# Patient Record
Sex: MALE | Race: WHITE | NOT HISPANIC OR LATINO | Employment: FULL TIME | URBAN - METROPOLITAN AREA
[De-identification: names, ages, dates, MRNs, and addresses within clinical notes are randomized per-mention and may not be internally consistent; named-entity substitution may affect disease eponyms.]

---

## 2017-04-06 ENCOUNTER — OFFICE VISIT (OUTPATIENT)
Dept: FAMILY MEDICINE | Facility: CLINIC | Age: 42
End: 2017-04-06
Payer: COMMERCIAL

## 2017-04-06 VITALS
SYSTOLIC BLOOD PRESSURE: 129 MMHG | HEART RATE: 60 BPM | BODY MASS INDEX: 28.27 KG/M2 | DIASTOLIC BLOOD PRESSURE: 85 MMHG | TEMPERATURE: 97.8 F | HEIGHT: 69 IN | WEIGHT: 190.9 LBS

## 2017-04-06 DIAGNOSIS — Z00.00 ROUTINE GENERAL MEDICAL EXAMINATION AT A HEALTH CARE FACILITY: Primary | ICD-10-CM

## 2017-04-06 DIAGNOSIS — R73.02 IGT (IMPAIRED GLUCOSE TOLERANCE): ICD-10-CM

## 2017-04-06 DIAGNOSIS — E78.6 HDL DEFICIENCY: ICD-10-CM

## 2017-04-06 DIAGNOSIS — Z13.6 CARDIOVASCULAR SCREENING; LDL GOAL LESS THAN 160: ICD-10-CM

## 2017-04-06 DIAGNOSIS — E78.1 HIGH TRIGLYCERIDES: ICD-10-CM

## 2017-04-06 PROCEDURE — 99396 PREV VISIT EST AGE 40-64: CPT | Performed by: FAMILY MEDICINE

## 2017-04-06 NOTE — PROGRESS NOTES
SUBJECTIVE:     CC: Ignacio Poole is an 41 year old male who presents for preventative health visit.     Healthy Habits:    Do you get at least three servings of calcium containing foods daily (dairy, green leafy vegetables, etc.)? yes    Amount of exercise or daily activities, outside of work: 15-30 minutes 1 day a week.    Problems taking medications regularly not applicable    Medication side effects: No    Have you had an eye exam in the past two years? no    Do you see a dentist twice per year? no    Do you have sleep apnea, excessive snoring or daytime drowsiness? Daytime drowsiness            Today's PHQ-2 Score:   PHQ-2 ( 1999 [employer]) 4/6/2017 4/3/2017   Q1: Little interest or pleasure in doing things 0 -   Q2: Feeling down, depressed or hopeless 0 -   PHQ-2 Score 0 -   Little interest or pleasure in doing things - Several days   Feeling down, depressed or hopeless - Several days   PHQ-2 Score - 2       Abuse: Current or Past(Physical, Sexual or Emotional)- No  Do you feel safe in your environment - Yes    Social History   Substance Use Topics     Smoking status: Never Smoker     Smokeless tobacco: Never Used     Alcohol use No     The patient does not drink >3 drinks per day nor >7 drinks per week.    Last PSA: No results found for: PSA    Recent Labs   Lab Test  11/06/14   0800   CHOL  170   HDL  31*   LDL  73   TRIG  332*   CHOLHDLRATIO  5.5*       Reviewed orders with patient. Reviewed health maintenance and updated orders accordingly - Yes    Reviewed and updated as needed this visit by clinical staff  Tobacco  Allergies  Meds  Med Hx  Surg Hx  Fam Hx  Soc Hx        Reviewed and updated as needed this visit by Provider      ROS:  C: NEGATIVE for fever, chills, change in weight  I: NEGATIVE for worrisome rashes, moles or lesions  E: NEGATIVE for vision changes or irritation  ENT: NEGATIVE for ear, mouth and throat problems  R: NEGATIVE for significant cough or SOB  CV: NEGATIVE for chest pain,  "palpitations or peripheral edema  GI: NEGATIVE for nausea, abdominal pain, heartburn, or change in bowel habits   male: negative for dysuria, hematuria, decreased urinary stream, erectile dysfunction, urethral discharge  M: NEGATIVE for significant arthralgias or myalgia  N: NEGATIVE for weakness, dizziness or paresthesias  P: NEGATIVE for changes in mood or affect      OBJECTIVE:     /85  Pulse 60  Temp 97.8  F (36.6  C) (Oral)  Ht 5' 9\" (1.753 m)  Wt 190 lb 14.4 oz (86.6 kg)  BMI 28.19 kg/m2  4}    ASSESSMENT/PLAN:         ICD-10-CM    1. Routine general medical examination at a health care facility Z00.00 Lipid panel reflex to direct LDL     Comprehensive metabolic panel   2. IGT (impaired glucose tolerance) R73.02 Comprehensive metabolic panel   3. High triglycerides E78.1 Lipid panel reflex to direct LDL     Comprehensive metabolic panel   4. HDL deficiency E78.6 Lipid panel reflex to direct LDL     Comprehensive metabolic panel   5. CARDIOVASCULAR SCREENING; LDL GOAL LESS THAN 160 Z13.6 Lipid panel reflex to direct LDL     Comprehensive metabolic panel       COUNSELING:  Reviewed preventive health counseling, as reflected in patient instructions    BP Screening:   Last 3 BP Readings:    BP Readings from Last 3 Encounters:   04/06/17 129/85   02/15/16 128/79   12/11/14 123/70       The following was recommended to the patient:  Re-screen BP within a year and recommended lifestyle modifications     reports that he has never smoked. He has never used smokeless tobacco.    Estimated body mass index is 28.19 kg/(m^2) as calculated from the following:    Height as of this encounter: 5' 9\" (1.753 m).    Weight as of this encounter: 190 lb 14.4 oz (86.6 kg).   Weight management plan: Discussed healthy diet and exercise guidelines and patient will follow up in 12 months in clinic to re-evaluate.    Counseling Resources:  ATP IV Guidelines  Pooled Cohorts Equation Calculator  FRAX Risk Assessment  ICSI " Preventive Guidelines  Dietary Guidelines for Americans, 2010  Go[employer]'s MyPlate  ASA Prophylaxis  Lung CA Screening    Matthieu Monzon MD  [facility]  --------------------------------------------------------------------------------------------------------------------------------------    SUBJECTIVE:  Ignacio Poole is a 41 year old male who presents to the clinic today for a routine physical exam.    The patient's last physical was 3 years ago.     Cholesterol   Date Value Ref Range Status   11/06/2014 170 <200 mg/dL Final     Comment:     LDL Cholesterol is the primary guide to therapy.   The NCEP recommends further evaluation of: patients with cholesterol greater   than 200 mg/dL if additional risk factors are present, cholesterol greater   than   240 mg/dL, triglycerides greater than 150 mg/dL, or HDL less than 40 mg/dL.     06/04/2004 169@ 115 - 199 mg/dL      HDL Cholesterol   Date Value Ref Range Status   11/06/2014 31 (L) >40 mg/dL Final   06/04/2004 32@ mg/dL      LDL Cholesterol Calculated   Date Value Ref Range Status   11/06/2014 73 0 - 129 mg/dL Final     Comment:     LDL Cholesterol is the primary guide to therapy: LDL-cholesterol goal in high   risk patients is <100 mg/dL and in very high risk patients is <70 mg/dL.     06/04/2004 110@ mg/dL      Triglycerides   Date Value Ref Range Status   11/06/2014 332 (H) 0 - 150 mg/dL Final     Comment:     Fasting specimen   06/04/2004 137@ mg/dL      Cholesterol/HDL Ratio   Date Value Ref Range Status   11/06/2014 5.5 (H) 0.0 - 5.0 Final   06/04/2004 5.3@       The patient's last fasting lipid panel was done 3 years ago and the results are listed above.        The 10-year ASCVD risk score ([place]andi FLOYD Jr, et al., 2013) is: 2.8%    Values used to calculate the score:      Age: 41 years      Sex: Male      Is Non- : No      Diabetic: No      Tobacco smoker: No      Systolic Blood Pressure: 129 mmHg      Is BP treated: No       HDL Cholesterol: 31 mg/dL      Total Cholesterol: 5.5 mmol/L        The patient reports that he has never been treated for high blood pressure.    The patient reports that he does not take a daily aspirin.    No results found for: HCVAB  The patient reports that he has not been screened for Hepatitis C    (Screen all baby boomers once per CDC-- the generation born from 1945 through 1965)    Immunization History   Administered Date(s) Administered     Influenza (IIV3) 11/12/2011, 10/29/2012     Influenza Vaccine IM 3yrs+ 4 Valent IIV4 12/17/2013     TD (ADULT, 7+) 06/04/2004     TDAP Vaccine (Adacel) 02/22/2013     The patient's believes that his last tetanus shot was given 3 year(s) ago.   The patient believes that he has not had a Zostavax in the past  The patient believes that he has not had a PPSV23 in the past.  The patient believes that he has not had a PCV13 in the past.  The patient believes that he has not had a seasonal flu vaccination this fall or winter.  The patient would like to have a no vaccinations today      No results found for this or any previous visit.]   The patient denies a family history of colon cancer.  The patient reports that he has not had a colonoscopy.    The patient reports that he does performs a self testicular exam monthly.  His currently used contraception is condoms. He is interested in a vasectomy in the near future.  The patient reports a family history of diabetes in his father and grandfather. .  The patient reports that he eats or drinks 3 servings of dairy products per day.   The patient reports that he has not had a dental appointments for approximately every 12 months.    The patient reports that he  has an eye examination approximately every 1.0 year(s).    Do you currently smoke? No  How many years have you smoked? 0   How many packs per day did you smoke on average? N/A  (if more than 30 pack year history and the patient is age 55-80 consider ordering an annual low  dose radiation lung CT to screen for cancer)  (Do not order if patient has quit more than 15 years ago or has a health condition that limits life expectancy or could not tolerate curative lung surgery)  Are you interested having a lung CT to screen for lung cancer? N/A    If the patient has smoked more that 100 cigarettes, has the patient had an imaging study (US or CT) for an AAA between the ages of 65 and 75? N/A              Patient Active Problem List   Diagnosis     CARDIOVASCULAR SCREENING; LDL GOAL LESS THAN 160     Migraine headache     Insomnia     Eczema     IGT (impaired glucose tolerance)     High triglycerides       Past Surgical History:   Procedure Laterality Date     BIOPSY  youth    lymph node neck     BIOPSY  8/27/85    lymph node neck       Family History   Problem Relation Age of Onset     GASTROINTESTINAL DISEASE Father      crohns     Asthma Father      Prostate Cancer Paternal Grandfather      GASTROINTESTINAL DISEASE Sister      crohns     Asthma Sister        Social History     Social History     Marital status:      Spouse name: N/A     Number of children: N/A     Years of education: N/A     Occupational History     Not on file.     Social History Main Topics     Smoking status: Never Smoker     Smokeless tobacco: Never Used     Alcohol use No     Drug use: No     Sexual activity: Yes     Partners: Female     Birth control/ protection: Condom, Rhythm, Abstinence     Other Topics Concern     Parent/Sibling W/ Cabg, Mi Or Angioplasty Before 65f 55m? No     Social History Narrative       Current Outpatient Prescriptions   Medication Sig Dispense Refill     traZODone (DESYREL) 50 MG tablet Take 1 tablet (50 mg) by mouth At Bedtime - DUE FOR OFFICE VISIT 90 tablet 0     halobetasol (ULTRAVATE) 0.05 % cream Apply topically 2 times daily (Patient not taking: Reported on 4/6/2017) 60 g 2     ibuprofen (ADVIL,MOTRIN) 200 MG tablet Take 200 mg by mouth every 4 hours as needed Reported on  "4/6/2017         PHYSICAL EXAMINATION:  Blood pressure 129/85, pulse 60, temperature 97.8  F (36.6  C), temperature source Oral, height 5' 9\" (1.753 m), weight 190 lb 14.4 oz (86.6 kg).  General appearance - healthy, alert and no distress  Skin - Skin color, texture, turgor normal. No rashes or lesions.  Head - Normocephalic. No masses, lesions, tenderness or abnormalities  Eyes - conjunctivae/corneas clear. PERRL, EOM's intact. Fundi benign  Ears - External ears normal. Canals clear. TM's normal.  Nose/Sinuses - Nares normal. Septum midline. Mucosa normal. No drainage or sinus tenderness.  Oropharynx - Lips, mucosa, and tongue normal. Teeth and gums normal.  Neck - Neck supple. No adenopathy. Thyroid symmetric, normal size,  Lungs - Percussion normal. Good diaphragmatic excursion. Lungs clear  Heart - PMI normal. No lifts, heaves, or thrills. RRR. No murmurs, clicks gallops or rub  Abdomen - Abdomen soft, non-tender. BS normal. No masses, organomegaly  Extremities - Extremities normal. No deformities, edema, or skin discoloration.  Musculoskeletal - Spine ROM normal. Muscular strength intact.  Peripheral pulses - radial=4/4, femoral=4/4, popliteal=4/4, dorsalis pedis=4/4,  Neuro - Gait normal. Reflexes normal and symmetric. Sensation grossly WNL.  Genitalia - Penis normal. No urethral discharge. Scrotum normal to palpation. No hernia.  Rectal - Rectal negative. Prostate palpation negative.  No rectal masses or abnormalities      Office Visit on 02/15/2016   Component Date Value Ref Range Status     Specimen Description 02/15/2016 Throat   Final     Rapid Strep A Screen 02/15/2016    Final                    Value:NEGATIVE: No Group A streptococcal antigen detected by immunoassay, await   culture report.       Micro Report Status 02/15/2016 FINAL 02/15/2016   Final     Specimen Description 02/15/2016 Throat   Final     Culture Micro 02/15/2016 No Beta Streptococcus isolated   Final     Micro Report Status 02/15/2016 " FINAL 02/17/2016   Final       ASSESSMENT:  No diagnosis found.    Well-Adult Physical Exam.  There are no preventive care reminders to display for this patient.  Health Maintenance   Topic Date Due     INFLUENZA VACCINE (SYSTEM ASSIGNED)  09/01/2017     LIPID SCREEN Q5 YR MALE (SYSTEM ASSIGNED)  11/06/2019     TETANUS IMMUNIZATION (SYSTEM ASSIGNED)  02/22/2023     MIGRAINE ACTION PLAN,ONE TIME,NO INBASKET  Completed         HEALTH CARE MAINTENENCE: The recommended screening tests and vaccinatons for this patient have been discussed as above.  The appropriate tests and vaccinations  have been ordered or declined by the patient. Please see the orders in EPIC.The patient specifically declines: n/a     Immunization Status:  up to date and documented    Patient Active Problem List   Diagnosis     CARDIOVASCULAR SCREENING; LDL GOAL LESS THAN 160     Migraine headache     Insomnia     Eczema     IGT (impaired glucose tolerance)     High triglycerides        ATP III Guidelines  ICSI Preventive Guidelines    PLAN:   The patient will make a future lab appointment for all bloodwork as they are not fasting today  Check a fasting lipid profile  Testicular self-exam encouraged  Check a fasting glucose  Discussed calcium intake, vitamins and supplements. Recommended 1000 mg of calcium daily  Weight loss through diet and exercise was recommended  Sunscreen use was recommended especially in the area of tatoos  Recommended dental exams every 6 months  Recommended eye exam every 1-2 years  Follow up in 1 year for the next preventative medical visit

## 2017-04-06 NOTE — NURSING NOTE
"Chief Complaint   Patient presents with     Physical       Initial /85  Pulse 60  Temp 97.8  F (36.6  C) (Oral)  Ht 5' 9\" (1.753 m)  Wt 190 lb 14.4 oz (86.6 kg)  BMI 28.19 kg/m2 Estimated body mass index is 28.19 kg/(m^2) as calculated from the following:    Height as of this encounter: 5' 9\" (1.753 m).    Weight as of this encounter: 190 lb 14.4 oz (86.6 kg).  Medication Reconciliation: complete   Loretta Herbert CMA      "

## 2017-04-06 NOTE — MR AVS SNAPSHOT
After Visit Summary   4/6/2017    Ignacio Poole    MRN: [id number]           Patient Information     Date Of Birth          1975        Visit Information        Provider Department      4/6/2017 6:30 PM Matthieu Monzon MD [facility]        Today's Diagnoses     Routine general medical examination at a health care facility    -  1    IGT (impaired glucose tolerance)        High triglycerides        HDL deficiency        CARDIOVASCULAR SCREENING; LDL GOAL LESS THAN 160          Care Instructions      Preventive Health Recommendations  Male Ages 40 to 49    Yearly exam:             See your health care provider every year in order to  o   Review health changes.   o   Discuss preventive care.    o   Review your medicines if your doctor has prescribed any.    You should be tested each year for STDs (sexually transmitted diseases) if you re at risk.     Have a cholesterol test every 5 years.     Have a colonoscopy (test for colon cancer) if someone in your family has had colon cancer or polyps before age 50.     After age 45, have a diabetes test (fasting glucose). If you are at risk for diabetes, you should have this test every 3 years.      Talk with your health care provider about whether or not a prostate cancer screening test (PSA) is right for you.    Shots: Get a flu shot each year. Get a tetanus shot every 10 years.     Nutrition:    Eat at least 5 servings of fruits and vegetables daily.     Eat whole-grain bread, whole-wheat pasta and brown rice instead of white grains and rice.     Talk to your provider about Calcium and Vitamin D.     Lifestyle    Exercise for at least 150 minutes a week (30 minutes a day, 5 days a week). This will help you control your weight and prevent disease.     Limit alcohol to one drink per day.     No smoking.     Wear sunscreen to prevent skin cancer.     See your dentist every six months for an exam and cleaning.       Please schedule a future  "laboratory appointment(s) and be sure to have fasted for 10 hours prior to arrival             Follow-ups after your visit        Future tests that were ordered for you today     Open Future Orders        Priority Expected Expires Ordered    Lipid panel reflex to direct LDL Routine  7/6/2017 4/6/2017    Comprehensive metabolic panel Routine  7/6/2017 4/6/2017            Who to contact     If you have questions or need follow up information about today's clinic visit or your schedule please contact [facility] directly at [phone].  Normal or non-critical lab and imaging results will be communicated to you by Yozonshart, letter or phone within 4 business days after the clinic has received the results. If you do not hear from us within 7 days, please contact the clinic through SoundBettert or phone. If you have a critical or abnormal lab result, we will notify you by phone as soon as possible.  Submit refill requests through MobileIgniter or call your pharmacy and they will forward the refill request to us. Please allow 3 business days for your refill to be completed.          Additional Information About Your Visit        Yozonshart Information     MobileIgniter gives you secure access to your electronic health record. If you see a primary care provider, you can also send messages to your care team and make appointments. If you have questions, please call your primary care clinic.  If you do not have a primary care provider, please call [phone] and they will assist you.        Care EveryWhere ID     This is your Care EveryWhere ID. This could be used by other organizations to access your [place] medical records  [id number]        Your Vitals Were     Pulse Temperature Height BMI (Body Mass Index)          60 97.8  F (36.6  C) (Oral) 5' 9\" (1.753 m) 28.19 kg/m2         Blood Pressure from Last 3 Encounters:   04/06/17 129/85   02/15/16 128/79   12/11/14 123/70    Weight from Last 3 Encounters:   04/06/17 190 lb 14.4 " oz (86.6 kg)   02/15/16 191 lb 12.8 oz (87 kg)   12/11/14 184 lb (83.5 kg)               Primary Care Provider Office Phone # Fax #    Matthieu Monzon [phone]       [facility] [address] [facility] [address]        Thank you!     Thank you for choosing [facility]  for your care. Our goal is always to provide you with excellent care. Hearing back from our patients is one way we can continue to improve our services. Please take a few minutes to complete the written survey that you may receive in the mail after your visit with us. Thank you!             Your Updated Medication List - Protect others around you: Learn how to safely use, store and throw away your medicines at www.disposemymeds.org.          This list is accurate as of: 4/6/17  7:09 PM.  Always use your most recent med list.                   Brand Name Dispense Instructions for use    halobetasol 0.05 % cream    ULTRAVATE    60 g    Apply topically 2 times daily       ibuprofen 200 MG tablet    ADVIL/MOTRIN     Take 200 mg by mouth every 4 hours as needed Reported on 4/6/2017       traZODone 50 MG tablet    DESYREL    90 tablet    Take 1 tablet (50 mg) by mouth At Bedtime - DUE FOR OFFICE VISIT

## 2017-04-06 NOTE — PATIENT INSTRUCTIONS
Preventive Health Recommendations  Male Ages 40 to 49    Yearly exam:             See your health care provider every year in order to  o   Review health changes.   o   Discuss preventive care.    o   Review your medicines if your doctor has prescribed any.    You should be tested each year for STDs (sexually transmitted diseases) if you re at risk.     Have a cholesterol test every 5 years.     Have a colonoscopy (test for colon cancer) if someone in your family has had colon cancer or polyps before age 50.     After age 45, have a diabetes test (fasting glucose). If you are at risk for diabetes, you should have this test every 3 years.      Talk with your health care provider about whether or not a prostate cancer screening test (PSA) is right for you.    Shots: Get a flu shot each year. Get a tetanus shot every 10 years.     Nutrition:    Eat at least 5 servings of fruits and vegetables daily.     Eat whole-grain bread, whole-wheat pasta and brown rice instead of white grains and rice.     Talk to your provider about Calcium and Vitamin D.     Lifestyle    Exercise for at least 150 minutes a week (30 minutes a day, 5 days a week). This will help you control your weight and prevent disease.     Limit alcohol to one drink per day.     No smoking.     Wear sunscreen to prevent skin cancer.     See your dentist every six months for an exam and cleaning.       Please schedule a future laboratory appointment(s) and be sure to have fasted for 10 hours prior to arrival

## 2017-04-11 DIAGNOSIS — Z13.6 CARDIOVASCULAR SCREENING; LDL GOAL LESS THAN 160: ICD-10-CM

## 2017-04-11 DIAGNOSIS — E78.1 HIGH TRIGLYCERIDES: ICD-10-CM

## 2017-04-11 DIAGNOSIS — E78.6 HDL DEFICIENCY: ICD-10-CM

## 2017-04-11 DIAGNOSIS — R73.02 IGT (IMPAIRED GLUCOSE TOLERANCE): ICD-10-CM

## 2017-04-11 DIAGNOSIS — Z00.00 ROUTINE GENERAL MEDICAL EXAMINATION AT A HEALTH CARE FACILITY: ICD-10-CM

## 2017-04-11 PROBLEM — Z91.89 10 YEAR RISK OF MI OR STROKE < 7.5%: Status: ACTIVE | Noted: 2017-04-11

## 2017-04-11 LAB
ALBUMIN SERPL-MCNC: 4 G/DL (ref 3.4–5)
ALP SERPL-CCNC: 78 U/L (ref 40–150)
ALT SERPL W P-5'-P-CCNC: 28 U/L (ref 0–70)
ANION GAP SERPL CALCULATED.3IONS-SCNC: 8 MMOL/L (ref 3–14)
AST SERPL W P-5'-P-CCNC: 19 U/L (ref 0–45)
BILIRUB SERPL-MCNC: 2.9 MG/DL (ref 0.2–1.3)
BUN SERPL-MCNC: 10 MG/DL (ref 7–30)
CALCIUM SERPL-MCNC: 9.1 MG/DL (ref 8.5–10.1)
CHLORIDE SERPL-SCNC: 104 MMOL/L (ref 94–109)
CHOLEST SERPL-MCNC: 160 MG/DL
CO2 SERPL-SCNC: 30 MMOL/L (ref 20–32)
CREAT SERPL-MCNC: 1.18 MG/DL (ref 0.66–1.25)
GFR SERPL CREATININE-BSD FRML MDRD: 68 ML/MIN/1.7M2
GLUCOSE SERPL-MCNC: 105 MG/DL (ref 70–99)
HDLC SERPL-MCNC: 40 MG/DL
LDLC SERPL CALC-MCNC: 72 MG/DL
NONHDLC SERPL-MCNC: 120 MG/DL
POTASSIUM SERPL-SCNC: 4 MMOL/L (ref 3.4–5.3)
PROT SERPL-MCNC: 6.9 G/DL (ref 6.8–8.8)
SODIUM SERPL-SCNC: 142 MMOL/L (ref 133–144)
TRIGL SERPL-MCNC: 238 MG/DL

## 2017-04-11 PROCEDURE — 80053 COMPREHEN METABOLIC PANEL: CPT | Performed by: FAMILY MEDICINE

## 2017-04-11 PROCEDURE — 80061 LIPID PANEL: CPT | Performed by: FAMILY MEDICINE

## 2017-04-11 PROCEDURE — 36415 COLL VENOUS BLD VENIPUNCTURE: CPT | Performed by: FAMILY MEDICINE

## 2017-04-11 NOTE — PROGRESS NOTES
Ignacio,  I have reviewed the results of the laboratory tests that we recently ordered. All of the lab work performed was normal or considered normal for you except the triglycerides and the glucose.    Ignacio,  The results of your glucose (blood sugar) test was slightly high and in the range of what is called Impaired Glucose Tolerance. This means that you are at a higher risk to develop type 2 diabetes in the future. Regular aerobic exercise and weight loss are the best ways to prevent yourself from having full blown diabetes. We should recheck this lab test every year at your physical.      The 10-year ASCVD risk score ([place] MARIEL Jr, et al., 2013) is: 1.1%    Values used to calculate the score:      Age: 41 years      Sex: Male      Is Non- : No      Diabetic: No      Tobacco smoker: No      Systolic Blood Pressure: 129 mmHg      Is BP treated: No      HDL Cholesterol: 40 mg/dL      Total Cholesterol: 160 mg/dL        Dear Ignacio,  Your cholesterol results are back and are abnormal. The lab values are included in this letter.    Your LDL (bad) cholesterol was normal.  The maximum acceptable level for you based on multiple risk factors including your family history, age, smoking status, and the presence of other diseases such as hypertension, diabetes, or heart disease is less than 190.    Your Triglycerides were too high.  Triglycerides are a measure of fat in your blood and high levels can put you at risk. The maximum acceptable value is 150.    Your HDL (good) cholesterol was normal.  A low HDL can put you at risk. The lowest acceptable level is generally considered to be 40 and some authorities recommend it be 50 or higher for women.    Therapeutic life style changes are usually recommended as the first treatment to help improve your cholesterol. My recommendations at this time include:    -Eating a lower fat and lower cholesterol diet.  -Eating less sweets with simple sugars like candy  or baked goods.  -Regular aerobic exercise such as walking, running, biking, roller blading or swimming.        Sincerely,  Matthieu Monzon    [address] [facility] [id number]    Sincerely,   Matthieu Monzon

## 2017-05-12 ENCOUNTER — MYC MEDICAL ADVICE (OUTPATIENT)
Dept: FAMILY MEDICINE | Facility: CLINIC | Age: 42
End: 2017-05-12

## 2017-05-12 DIAGNOSIS — G43.809 OTHER MIGRAINE WITHOUT STATUS MIGRAINOSUS, NOT INTRACTABLE: Primary | ICD-10-CM

## 2017-05-12 RX ORDER — BUTALBITAL, ACETAMINOPHEN AND CAFFEINE 50; 325; 40 MG/1; MG/1; MG/1
1 TABLET ORAL EVERY 4 HOURS PRN
Qty: 30 TABLET | Refills: 1 | Status: SHIPPED | OUTPATIENT
Start: 2017-05-12 | End: 2017-05-16

## 2017-05-12 NOTE — TELEPHONE ENCOUNTER
Pharmacy Monitoring Program, [place] shows no controlled substance medication in the past year.   Lesly Mixon RN

## 2017-05-12 NOTE — TELEPHONE ENCOUNTER
The requested prescription(s) has/have been approved and has/have been printed and signed. This note was forwarded to the patient care pool to contact the patient to arrange for the patient to obtain the signed prescription(s).  Matthieu Monzon

## 2017-05-17 RX ORDER — BUTALBITAL, ACETAMINOPHEN AND CAFFEINE 50; 325; 40 MG/1; MG/1; MG/1
1 TABLET ORAL EVERY 4 HOURS PRN
Qty: 30 TABLET | Refills: 1 | Status: SHIPPED | OUTPATIENT
Start: 2017-05-17 | End: 2018-04-16

## 2017-10-30 DIAGNOSIS — G47.00 INSOMNIA: ICD-10-CM

## 2017-10-30 RX ORDER — TRAZODONE HYDROCHLORIDE 50 MG/1
50 TABLET, FILM COATED ORAL AT BEDTIME
Qty: 90 TABLET | Refills: 1 | Status: SHIPPED | OUTPATIENT
Start: 2017-10-30 | End: 2019-05-28

## 2018-04-12 NOTE — PROGRESS NOTES
SUBJECTIVE:   CC: Ignacio Poole is an 42 year old male who presents for preventative health visit.     Physical   Annual:     Getting at least 3 servings of Calcium per day::  Yes    Bi-annual eye exam::  Yes    Dental care twice a year::  NO    Sleep apnea or symptoms of sleep apnea::  Daytime drowsiness    Diet::  Carbohydrate counting    Frequency of exercise::  1 day/week    Duration of exercise::  Less than 15 minutes    Taking medications regularly::  Yes    Medication side effects::  None    Additional concerns today::  No                     Today's PHQ-2 Score:   PHQ-2 ( 1999 [employer]) 4/12/2018   Q1: Little interest or pleasure in doing things 1   Q2: Feeling down, depressed or hopeless 1   PHQ-2 Score 2   Q1: Little interest or pleasure in doing things Several days   Q2: Feeling down, depressed or hopeless Several days   PHQ-2 Score 2       Abuse: Current or Past(Physical, Sexual or Emotional)- No  Do you feel safe in your environment - Yes    Social History   Substance Use Topics     Smoking status: Never Smoker     Smokeless tobacco: Never Used     Alcohol use No     Alcohol Use 4/12/2018   If you drink alcohol do you typically have greater than 3 drinks per day OR greater than 7 drinks per week? No   No flowsheet data found.    Last PSA: No results found for: PSA    Reviewed orders with patient. Reviewed health maintenance and updated orders accordingly - Yes       Reviewed and updated as needed this visit by clinical staff  Tobacco  Allergies  Meds         Reviewed and updated as needed this visit by Provider            Review of Systems  C: NEGATIVE for fever, chills, change in weight  I: NEGATIVE for worrisome rashes, moles or lesions  E: NEGATIVE for vision changes or irritation  ENT: NEGATIVE for ear, mouth and throat problems  R: NEGATIVE for significant cough or SOB  CV: NEGATIVE for chest pain, palpitations or peripheral edema  GI: NEGATIVE for nausea, abdominal pain, heartburn, or change  "in bowel habits   male: negative for dysuria, hematuria, decreased urinary stream, erectile dysfunction, urethral discharge  M: NEGATIVE for significant arthralgias or myalgia  N: NEGATIVE for weakness, dizziness or paresthesias  P: NEGATIVE for changes in mood or affect    OBJECTIVE:   /70  Pulse 80  Temp 98  F (36.7  C) (Oral)  Resp 16  Ht 5' 9\" (1.753 m)  Wt 193 lb (87.5 kg)  SpO2 98%  BMI 28.5 kg/m2    Physical Exam  GENERAL: healthy, alert and no distress  EYES: Eyes grossly normal to inspection, PERRL and conjunctivae and sclerae normal  HENT: ear canals and TM's normal, nose and mouth without ulcers or lesions  NECK: no adenopathy, no asymmetry, masses, or scars and thyroid normal to palpation  RESP: lungs clear to auscultation - no rales, rhonchi or wheezes  CV: regular rate and rhythm, normal S1 S2, no S3 or S4, no murmur, click or rub, no peripheral edema and peripheral pulses strong  ABDOMEN: soft, nontender, no hepatosplenomegaly, no masses and bowel sounds normal  MS: no gross musculoskeletal defects noted, no edema  SKIN: no suspicious lesions or rashes  NEURO: Normal strength and tone, mentation intact and speech normal  PSYCH: mentation appears normal, affect normal/bright    ASSESSMENT/PLAN:       ICD-10-CM    1. Routine general medical examination at a health care facility Z00.00    2. Other migraine without status migrainosus, not intractable G43.809 butalbital-acetaminophen-caffeine (FIORICET/ESGIC) -40 MG per tablet       COUNSELING:   Reviewed preventive health counseling, as reflected in patient instructions       Regular exercise       Healthy diet/nutrition         reports that he has never smoked. He has never used smokeless tobacco.    Estimated body mass index is 28.5 kg/(m^2) as calculated from the following:    Height as of this encounter: 5' 9\" (1.753 m).    Weight as of this encounter: 193 lb (87.5 kg).     Lose weight with exercise program  Counseling " Resources:  ATP IV Guidelines  Pooled Cohorts Equation Calculator  FRAX Risk Assessment  ICSI Preventive Guidelines  Dietary Guidelines for Americans, 2010  [employer] MyPlate  ASA Prophylaxis  Lung CA Screening    Harris Martinez MD  [facility] [place]  Answers for HPI/ROS submitted by the patient on 4/12/2018   PHQ-2 Score: 2  Blood work at work

## 2018-04-16 ENCOUNTER — OFFICE VISIT (OUTPATIENT)
Dept: FAMILY MEDICINE | Facility: CLINIC | Age: 43
End: 2018-04-16
Payer: COMMERCIAL

## 2018-04-16 VITALS
RESPIRATION RATE: 16 BRPM | OXYGEN SATURATION: 98 % | TEMPERATURE: 98 F | HEART RATE: 80 BPM | BODY MASS INDEX: 28.58 KG/M2 | DIASTOLIC BLOOD PRESSURE: 70 MMHG | HEIGHT: 69 IN | SYSTOLIC BLOOD PRESSURE: 122 MMHG | WEIGHT: 193 LBS

## 2018-04-16 DIAGNOSIS — G43.809 OTHER MIGRAINE WITHOUT STATUS MIGRAINOSUS, NOT INTRACTABLE: ICD-10-CM

## 2018-04-16 DIAGNOSIS — Z00.00 ROUTINE GENERAL MEDICAL EXAMINATION AT A HEALTH CARE FACILITY: Primary | ICD-10-CM

## 2018-04-16 PROCEDURE — 99396 PREV VISIT EST AGE 40-64: CPT | Performed by: FAMILY MEDICINE

## 2018-04-16 RX ORDER — BUTALBITAL, ACETAMINOPHEN AND CAFFEINE 50; 325; 40 MG/1; MG/1; MG/1
1 TABLET ORAL EVERY 4 HOURS PRN
Qty: 30 TABLET | Refills: 1 | Status: SHIPPED | OUTPATIENT
Start: 2018-04-16 | End: 2019-04-08

## 2018-04-16 NOTE — MR AVS SNAPSHOT
After Visit Summary   4/16/2018    Ignacio Poole    MRN: [id number]           Patient Information     Date Of Birth          1975        Visit Information        Provider Department      4/16/2018 3:15 PM Harris Martinez MD [facility]        Today's Diagnoses     Routine general medical examination at a health care facility    -  1    Other migraine without status migrainosus, not intractable          Care Instructions      Preventive Health Recommendations  Male Ages 40 to 49    Yearly exam:             See your health care provider every year in order to  o   Review health changes.   o   Discuss preventive care.    o   Review your medicines if your doctor has prescribed any.    You should be tested each year for STDs (sexually transmitted diseases) if you re at risk.     Have a cholesterol test every 5 years.     Have a colonoscopy (test for colon cancer) if someone in your family has had colon cancer or polyps before age 50.     After age 45, have a diabetes test (fasting glucose). If you are at risk for diabetes, you should have this test every 3 years.      Talk with your health care provider about whether or not a prostate cancer screening test (PSA) is right for you.    Shots: Get a flu shot each year. Get a tetanus shot every 10 years.     Nutrition:    Eat at least 5 servings of fruits and vegetables daily.     Eat whole-grain bread, whole-wheat pasta and brown rice instead of white grains and rice.     Talk to your provider about Calcium and Vitamin D.     Lifestyle    Exercise for at least 150 minutes a week (30 minutes a day, 5 days a week). This will help you control your weight and prevent disease.     Limit alcohol to one drink per day.     No smoking.     Wear sunscreen to prevent skin cancer.     See your dentist every six months for an exam and cleaning.              Follow-ups after your visit        Who to contact     If you have questions or need follow  "up information about today's clinic visit or your schedule please contact [facility] directly at [phone].  Normal or non-critical lab and imaging results will be communicated to you by MyChart, letter or phone within 4 business days after the clinic has received the results. If you do not hear from us within 7 days, please contact the clinic through [facility] or phone. If you have a critical or abnormal lab result, we will notify you by phone as soon as possible.  Submit refill requests through [facility] or call your pharmacy and they will forward the refill request to us. Please allow 3 business days for your refill to be completed.          Additional Information About Your Visit        [facility] Information     [facility] gives you secure access to your electronic health record. If you see a primary care provider, you can also send messages to your care team and make appointments. If you have questions, please call your primary care clinic.  If you do not have a primary care provider, please call [phone] and they will assist you.        Care EveryWhere ID     This is your Care EveryWhere ID. This could be used by other organizations to access your [facility] medical records  [id number]        Your Vitals Were     Pulse Temperature Respirations Height Pulse Oximetry BMI (Body Mass Index)    80 98  F (36.7  C) (Oral) 16 5' 9\" (1.753 m) 98% 28.5 kg/m2       Blood Pressure from Last 3 Encounters:   04/16/18 122/70   04/06/17 129/85   02/15/16 128/79    Weight from Last 3 Encounters:   04/16/18 193 lb (87.5 kg)   04/06/17 190 lb 14.4 oz (86.6 kg)   02/15/16 191 lb 12.8 oz (87 kg)              Today, you had the following     No orders found for display         Where to get your medicines      Some of these will need a paper prescription and others can be bought over the counter.  Ask your nurse if you have questions.     Bring a paper prescription for each of these medications     " butalbital-acetaminophen-caffeine -40 MG per tablet          Primary Care Provider Office Phone # Fax #    Matthieu Monzon [phone] [address] [facility] [address]        Equal Access to Services     FLORIDALMA VERA : Hadii aad ku hadlizo Soomaali, waaxda luqadaha, qaybta kaalmada adeegyada, waxay idiin hayaan adedev fiore margareth treadwell. So [facility] [phone].    ATENCIÓN: Si habla español, tiene a gomez disposición servicios gratuitos de asistencia lingüística. Llame al [phone].    We comply with applicable federal civil rights laws and [place] laws. We do not discriminate on the basis of race, color, national origin, age, disability, sex, sexual orientation, or gender identity.            Thank you!     Thank you for choosing [facility]  for your care. Our goal is always to provide you with excellent care. Hearing back from our patients is one way we can continue to improve our services. Please take a few minutes to complete the written survey that you may receive in the mail after your visit with us. Thank you!             Your Updated Medication List - Protect others around you: Learn how to safely use, store and throw away your medicines at www.disposemymeds.org.          This list is accurate as of 4/16/18  3:39 PM.  Always use your most recent med list.                   Brand Name Dispense Instructions for use Diagnosis    butalbital-acetaminophen-caffeine -40 MG per tablet    FIORICET/ESGIC    30 tablet    Take 1 tablet by mouth every 4 hours as needed    Other migraine without status migrainosus, not intractable       halobetasol 0.05 % cream    ULTRAVATE    60 g    Apply topically 2 times daily    Eczema       ibuprofen 200 MG tablet    ADVIL/MOTRIN     Take 200 mg by mouth every 4 hours as needed Reported on 4/6/2017        traZODone 50 MG tablet    DESYREL    90 tablet    Take 1 tablet (50 mg) by mouth At Bedtime    Insomnia

## 2018-04-16 NOTE — NURSING NOTE
"Chief Complaint   Patient presents with     Physical     [facility] physical forms       Initial /76  Pulse 80  Temp 98  F (36.7  C) (Oral)  Resp 16  Ht 5' 9\" (1.753 m)  Wt 193 lb (87.5 kg)  SpO2 98%  BMI 28.5 kg/m2 Estimated body mass index is 28.5 kg/(m^2) as calculated from the following:    Height as of this encounter: 5' 9\" (1.753 m).    Weight as of this encounter: 193 lb (87.5 kg).  Medication Reconciliation: complete  Karel Ray CMA    "

## 2019-04-08 ENCOUNTER — OFFICE VISIT (OUTPATIENT)
Dept: FAMILY MEDICINE | Facility: CLINIC | Age: 44
End: 2019-04-08
Payer: COMMERCIAL

## 2019-04-08 VITALS
TEMPERATURE: 98.6 F | BODY MASS INDEX: 28.44 KG/M2 | RESPIRATION RATE: 18 BRPM | HEIGHT: 69 IN | DIASTOLIC BLOOD PRESSURE: 79 MMHG | HEART RATE: 64 BPM | OXYGEN SATURATION: 98 % | SYSTOLIC BLOOD PRESSURE: 138 MMHG | WEIGHT: 192 LBS

## 2019-04-08 DIAGNOSIS — Z11.4 SCREENING FOR HIV (HUMAN IMMUNODEFICIENCY VIRUS): Primary | ICD-10-CM

## 2019-04-08 DIAGNOSIS — F41.1 GAD (GENERALIZED ANXIETY DISORDER): ICD-10-CM

## 2019-04-08 DIAGNOSIS — G43.809 OTHER MIGRAINE WITHOUT STATUS MIGRAINOSUS, NOT INTRACTABLE: ICD-10-CM

## 2019-04-08 PROCEDURE — 99214 OFFICE O/P EST MOD 30 MIN: CPT | Performed by: FAMILY MEDICINE

## 2019-04-08 RX ORDER — BUTALBITAL, ACETAMINOPHEN AND CAFFEINE 50; 325; 40 MG/1; MG/1; MG/1
1 TABLET ORAL EVERY 4 HOURS PRN
Qty: 30 TABLET | Refills: 1 | Status: SHIPPED | OUTPATIENT
Start: 2019-04-08 | End: 2020-03-03

## 2019-04-08 RX ORDER — PAROXETINE 20 MG/1
TABLET, FILM COATED ORAL
Qty: 30 TABLET | Refills: 0 | Status: SHIPPED | OUTPATIENT
Start: 2019-04-08 | End: 2019-05-13

## 2019-04-08 ASSESSMENT — ANXIETY QUESTIONNAIRES
IF YOU CHECKED OFF ANY PROBLEMS ON THIS QUESTIONNAIRE, HOW DIFFICULT HAVE THESE PROBLEMS MADE IT FOR YOU TO DO YOUR WORK, TAKE CARE OF THINGS AT HOME, OR GET ALONG WITH OTHER PEOPLE: VERY DIFFICULT
GAD7 TOTAL SCORE: 15
5. BEING SO RESTLESS THAT IT IS HARD TO SIT STILL: NEARLY EVERY DAY
3. WORRYING TOO MUCH ABOUT DIFFERENT THINGS: MORE THAN HALF THE DAYS
6. BECOMING EASILY ANNOYED OR IRRITABLE: MORE THAN HALF THE DAYS
7. FEELING AFRAID AS IF SOMETHING AWFUL MIGHT HAPPEN: MORE THAN HALF THE DAYS
2. NOT BEING ABLE TO STOP OR CONTROL WORRYING: MORE THAN HALF THE DAYS
1. FEELING NERVOUS, ANXIOUS, OR ON EDGE: SEVERAL DAYS

## 2019-04-08 ASSESSMENT — PATIENT HEALTH QUESTIONNAIRE - PHQ9
SUM OF ALL RESPONSES TO PHQ QUESTIONS 1-9: 14
5. POOR APPETITE OR OVEREATING: NEARLY EVERY DAY

## 2019-04-08 ASSESSMENT — PAIN SCALES - GENERAL: PAINLEVEL: NO PAIN (0)

## 2019-04-08 ASSESSMENT — MIFFLIN-ST. JEOR: SCORE: 1756.29

## 2019-04-08 NOTE — NURSING NOTE
"Chief Complaint   Patient presents with     Anxiety     x 1 month, having a hard time accomplishing things,     Other     can't focus or shut my brain down, trouble focus, sleeping issues     Health Maintenance     physical,       Initial /79   Pulse 64   Temp 98.6  F (37  C) (Oral)   Resp 18   Ht 1.753 m (5' 9\")   Wt 87.1 kg (192 lb)   SpO2 98%   BMI 28.35 kg/m   Estimated body mass index is 28.35 kg/m  as calculated from the following:    Height as of this encounter: 1.753 m (5' 9\").    Weight as of this encounter: 87.1 kg (192 lb).  Medication Reconciliation: complete  Irais Gaytan, SURESH  "

## 2019-04-08 NOTE — PROGRESS NOTES
"SUBJECTIVE:  Ignacio Poole is a 43 year old male who presents for a follow up evaluation of possible anxiety.   Within the last 1-2 month(s), he noticed that he was reacting differently to things.     He reports that something he would enjoy like cleamning the garage seems like a big burden.  He gets overwhelmed easily.    He reports that he is tired of being the responsible  dior.       No family history of anxiety or depression.     He denies sny history of ADHD      V\He drinks very little alcohol   No smoking  No drugs     He reports that it takes an hour to fall asleep most nights  Any noise will delay his sleep onset  This has been an issue for many years     The kids are 18 16 and 13 and 8           CLAUDINE-7    Over the last 2 weeks, how often have you been bothered by the following problems?  (Use an \"x\" to indicate your answer) Not at all                (0) Several days                (1) More than half the days        (2) Nearly every day          (3)   1. Feeling nervous, anxious or on edge  x     2. Not being able to stop or control worrying   x    3. Worrying too much about different things   x    4. Trouble relaxing    x   5. Being so restless that it is hard to sit still    x   6. Becoming easily annoyed or irritable   x    7. Feeling afraid as if something awful might happen   x      Total__15_____    Cut points for:   Mild Anxiety =  5  Moderate= 10  Severe=  15    Last PHQ-9 score on record= 14    The patient reports that he has not attended mental health counseling for his current symptoms.  He does nothave a mental health appointment scheduled for the near future.    OBJECTIVE:  General: the patient had a calm and pleasant affect during the visit today.    ASSESSMENT:Generalized Anxiety Disorder (CLAUDINE)  And possible(bere) mild depression  Possible(bere) burnout       PLAN:  We will start the patient on Paxil (paroxetine) 10  mg dailyfor a week and then to 20 mg continuously after that. I instructed " the patient return to clinic for appointment in 1 month(s).     The patient was advised of the potential side effects of the medication and was asked to call if any of them persist past 7 days of starting it or starting on a new dose.        Due to the patient's significant issue with insomnia at this point we will keep him on trazodone but may consider discontinuing it.       The patient was recommended to seek individual counseling and I put in a referral for that.

## 2019-04-09 ASSESSMENT — ANXIETY QUESTIONNAIRES: GAD7 TOTAL SCORE: 15

## 2019-05-13 ENCOUNTER — MYC REFILL (OUTPATIENT)
Dept: FAMILY MEDICINE | Facility: CLINIC | Age: 44
End: 2019-05-13

## 2019-05-13 DIAGNOSIS — F41.1 GAD (GENERALIZED ANXIETY DISORDER): ICD-10-CM

## 2019-05-13 RX ORDER — PAROXETINE 20 MG/1
20 TABLET, FILM COATED ORAL DAILY
Qty: 17 TABLET | Refills: 0 | Status: SHIPPED | OUTPATIENT
Start: 2019-05-13 | End: 2019-05-28

## 2019-05-16 ENCOUNTER — TRANSFERRED RECORDS (OUTPATIENT)
Dept: HEALTH INFORMATION MANAGEMENT | Facility: CLINIC | Age: 44
End: 2019-05-16

## 2019-05-20 ENCOUNTER — OFFICE VISIT (OUTPATIENT)
Dept: FAMILY MEDICINE | Facility: CLINIC | Age: 44
End: 2019-05-20
Payer: COMMERCIAL

## 2019-05-20 VITALS
SYSTOLIC BLOOD PRESSURE: 132 MMHG | BODY MASS INDEX: 28.94 KG/M2 | WEIGHT: 196 LBS | DIASTOLIC BLOOD PRESSURE: 80 MMHG | TEMPERATURE: 98.2 F | HEART RATE: 83 BPM | OXYGEN SATURATION: 98 %

## 2019-05-20 DIAGNOSIS — Z01.818 PREOP GENERAL PHYSICAL EXAM: Primary | ICD-10-CM

## 2019-05-20 DIAGNOSIS — S42.002A CLOSED DISPLACED FRACTURE OF LEFT CLAVICLE, UNSPECIFIED PART OF CLAVICLE, INITIAL ENCOUNTER: ICD-10-CM

## 2019-05-20 PROCEDURE — 99214 OFFICE O/P EST MOD 30 MIN: CPT | Performed by: FAMILY MEDICINE

## 2019-05-20 RX ORDER — OXYCODONE AND ACETAMINOPHEN 5; 325 MG/1; MG/1
TABLET ORAL
Refills: 0 | COMMUNITY
Start: 2019-05-16 | End: 2019-05-28

## 2019-05-20 ASSESSMENT — PAIN SCALES - GENERAL: PAINLEVEL: NO PAIN (0)

## 2019-05-20 NOTE — LETTER
My Migraine Action Plan      Date: 5/20/2019     My Name: Ignacio Poole   YOB: 1975  My Pharmacy:    [facility]  [facility] [place] [address] [facility], [address]  [id number] - [place] - [address] [facility]       My (Preventative) Control Medicine:          My Rescue Medicine: grace   My Doctor: Matthieu Monzon     My Clinic: [facility]  [address] [facility] [address] [phone]        GREEN ZONE = Good Control    My headache plan is working.   I can do what I need to do.           I WILL:     ? Keep managing my triggers.  ? Write in my migraine diary each time I have a headache.  ? Keep taking my preventive (controller) medicine daily.  ? Take my relief and rescue medicine as needed.             YELLOW ZONE = Not Enough Control    My headache plan isn t always working.   My headaches keep me from doing   some of the things I need to do.       I WILL:     ? Set goals to control my triggers and act on them.  ? Write in my migraine diary each time I have a headache and review it for                      patterns or new triggers.  ? Keep taking my preventive (controller) medicine daily.  ? Take my relief and rescue medicine as needed.  ? Call my doctor or clinic at if I stay in the Yellow Zone.             RED ZONE = Poor or No Control    My headache plan has  failed. I can t do anything  when I have one. My  medicines aren t working.           I WILL:   ? Set goals to control my triggers and act on them.  ? Write in my migraine diary each time I have a headache and review it for                      patterns or new triggers.  ? Keep taking my preventive (controller) medicine daily.  ? Take my relief and rescue medicine as needed.  ? Call my doctor or clinic or go to urgent care or an ER if I m having the worst                  headache of my life.  ? Call my doctor or  clinic or go to urgent care or an ER if my medicine doesn t work.  ? Let my doctor or clinic know within 2 weeks if I have gone to an urgent care or             emergency department.          Provider specific instructions:  greg

## 2019-05-20 NOTE — NURSING NOTE
"Chief Complaint   Patient presents with     Pre-Op Exam       Initial /80   Pulse 83   Temp 98.2  F (36.8  C) (Oral)   Wt 88.9 kg (196 lb)   SpO2 98%   BMI 28.94 kg/m   Estimated body mass index is 28.94 kg/m  as calculated from the following:    Height as of 4/8/19: 1.753 m (5' 9\").    Weight as of this encounter: 88.9 kg (196 lb).  Medication Reconciliation: complete    DEANNA Pitts MA    "

## 2019-05-20 NOTE — PROGRESS NOTES
[facility]  [address] [facility] [address] [phone]  Dept: [phone]    PRE-OP EVALUATION:  Today's date: 2019    Ignacio Poole (: 1975) presents for pre-operative evaluation assessment as requested by Dr. Wall.  He requires evaluation and anesthesia risk assessment prior to undergoing surgery/procedure for treatment of left collarbone  .    Fax number for surgical facility: [facility]  Primary Physician: Matthieu Monzon  Type of Anesthesia Anticipated: to be determined    Patient has a Health Care Directive or Living Will:  NO    Preop Questions 2019   Who is doing your surgery? julio c   What are you having done? collarbone fracture repair   Date of Surgery/Procedure: 2019   Facility or Hospital where procedure/surgery will be performed: [facility]   1.  Do you have a history of Heart attack, stroke, stent, coronary bypass surgery, or other heart surgery? No   2.  Do you ever have any pain or discomfort in your chest? No   3.  Do you have a history of  Heart Failure? No   4.   Are you troubled by shortness of breath when:  walking on a level surface, or up a slight hill, or at night? No   5.  Do you currently have a cold, bronchitis or other respiratory infection? No   6.  Do you have a cough, shortness of breath, or wheezing? No   7.  Do you sometimes get pains in the calves of your legs when you walk? No   8. Do you or anyone in your family have previous history of blood clots? No   9.  Do you or does anyone in your family have a serious bleeding problem such as prolonged bleeding following surgeries or cuts? No   10. Have you ever had problems with anemia or been told to take iron pills? No   11. Have you had any abnormal blood loss such as black, tarry or bloody stools? No   12. Have you ever had a blood transfusion? No   13. Have you or any of your relatives ever had problems with anesthesia? No   14.  Do you have sleep apnea, excessive snoring or daytime drowsiness? No   15. Do you have any prosthetic heart valves? No   16. Do you have prosthetic joints? No         HPI:     HPI related to upcoming procedure: left fracture clavicle      DEPRESSION - Patient has a long history of Depression of moderate severity requiring medication for control with recent symptoms being stable..Current symptoms of depression include none.                                                                                                                                                                                    .    MEDICAL HISTORY:     Patient Active Problem List    Diagnosis Date Noted     Other migraine without status migrainosus, not intractable 05/12/2017     Priority: Medium     10 year risk of MI or stroke < 7.5%, 1.1% in April 2017 04/11/2017     Priority: Medium     HDL deficiency 04/06/2017     Priority: Medium     High triglycerides 04/22/2015     Priority: Medium     IGT (impaired glucose tolerance) 12/11/2014     Priority: Medium     Eczema 09/21/2014     Priority: Medium     Insomnia 06/25/2014     Priority: Medium     CARDIOVASCULAR SCREENING; LDL GOAL LESS THAN 160 10/31/2010     Priority: Medium      Past Medical History:   Diagnosis Date     Migraine      Uncomplicated asthma youth    excersize induced, cold weather     Past Surgical History:   Procedure Laterality Date     BIOPSY  youth    lymph node neck     BIOPSY  8/27/85    lymph node neck     Current Outpatient Medications   Medication Sig Dispense Refill     butalbital-acetaminophen-caffeine (FIORICET/ESGIC) -40 MG tablet Take 1 tablet by mouth every 4 hours as needed 30 tablet 1     halobetasol (ULTRAVATE) 0.05 % cream Apply topically 2 times daily 60 g 2     ibuprofen (ADVIL,MOTRIN) 200 MG tablet Take 200 mg by mouth every 4 hours as needed Reported on 4/6/2017       PARoxetine (PAXIL) 20 MG tablet Take 1 tablet (20 mg) by mouth daily 17 tablet 0      traZODone (DESYREL) 50 MG tablet Take 1 tablet (50 mg) by mouth At Bedtime 90 tablet 1     oxyCODONE-acetaminophen (PERCOCET) 5-325 MG tablet TK 1 TO 2 TS PO Q 6 H  PRN  0     OTC products: None, except as noted above    No Known Allergies   Latex Allergy: NO    Social History     Tobacco Use     Smoking status: Never Smoker     Smokeless tobacco: Never Used   Substance Use Topics     Alcohol use: No     History   Drug Use No       REVIEW OF SYSTEMS:   CONSTITUTIONAL: NEGATIVE for fever, chills, change in weight  INTEGUMENTARY/SKIN: NEGATIVE for worrisome rashes, moles or lesions  EYES: NEGATIVE for vision changes or irritation  ENT/MOUTH: NEGATIVE for ear, mouth and throat problems  RESP: NEGATIVE for significant cough or SOB  BREAST: NEGATIVE for masses, tenderness or discharge  CV: NEGATIVE for chest pain, palpitations or peripheral edema  GI: NEGATIVE for nausea, abdominal pain, heartburn, or change in bowel habits  : NEGATIVE for frequency, dysuria, or hematuria  MUSCULOSKELETAL: NEGATIVE for significant arthralgias or myalgia  NEURO: NEGATIVE for weakness, dizziness or paresthesias  ENDOCRINE: NEGATIVE for temperature intolerance, skin/hair changes  HEME: NEGATIVE for bleeding problems  PSYCHIATRIC: NEGATIVE for changes in mood or affect    EXAM:   /80   Pulse 83   Temp 98.2  F (36.8  C) (Oral)   Wt 88.9 kg (196 lb)   SpO2 98%   BMI 28.94 kg/m      GENERAL APPEARANCE: healthy, alert and no distress     EYES: EOMI,  PERRL     HENT: ear canals and TM's normal and nose and mouth without ulcers or lesions     NECK: no adenopathy, no asymmetry, masses, or scars and thyroid normal to palpation     RESP: lungs clear to auscultation - no rales, rhonchi or wheezes     CV: regular rates and rhythm, normal S1 S2, no S3 or S4 and no murmur, click or rub     ABDOMEN:  soft, nontender, no HSM or masses and bowel sounds normal     MS: extremities normal- no gross deformities noted, no evidence of  inflammation in joints, FROM in all extremities.     SKIN: no suspicious lesions or rashes     NEURO: Normal strength and tone, sensory exam grossly normal, mentation intact and speech normal     PSYCH: mentation appears normal. and affect normal/bright     LYMPHATICS: No cervical adenopathy    DIAGNOSTICS:   No labs or EKG required for low risk surgery (cataract, skin procedure, breast biopsy, etc)    Recent Labs   Lab Test 04/11/17  0737 11/06/14  0800 04/04/12  1855   HGB  --   --  15.6   PLT  --   --  164    139  --    POTASSIUM 4.0 4.3  --    CR 1.18 1.06  --         IMPRESSION:   Reason for surgery/procedure: left clavicle fracture  Diagnosis/reason for consult: Anesthesia       The proposed surgical procedure is considered INTERMEDIATE risk.    REVISED CARDIAC RISK INDEX  The patient has the following serious cardiovascular risks for perioperative complications such as (MI, PE, VFib and 3  AV Block):  No serious cardiac risks  INTERPRETATION: 0 risks: Class I (very low risk - 0.4% complication rate)    The patient has the following additional risks for perioperative complications:  No identified additional risks      ICD-10-CM    1. Preop general physical exam Z01.818    2. Closed displaced fracture of left clavicle, unspecified part of clavicle, initial encounter S42.002A        RECOMMENDATIONS:         --Patient is to take all scheduled medications on the day of surgery EXCEPT for modifications listed below.    APPROVAL GIVEN to proceed with proposed procedure, without further diagnostic evaluation       Signed Electronically by: Harris Martinez MD    Copy of this evaluation report is provided to requesting physician.    [place] Preop Guidelines    Revised Cardiac Risk Index

## 2019-05-20 NOTE — LETTER
My Depression Action Plan  Name: Ignacio Poole   Date of Birth 1975  Date: 5/20/2019    My doctor: Matthieu Monzon   My clinic: [facility]  [id number] [place] [id number] [phone]          GREEN    ZONE   Good Control    What it looks like:     Things are going generally well. You have normal up s and down s. You may even feel depressed from time to time, but bad moods usually last less than a day.   What you need to do:  1. Continue to care for yourself (see self care plan)  2. Check your depression survival kit and update it as needed  3. Follow your physician s recommendations including any medication.  4. Do not stop taking medication unless you consult with your physician first.           YELLOW         ZONE Getting Worse    What it looks like:     Depression is starting to interfere with your life.     It may be hard to get out of bed; you may be starting to isolate yourself from others.    Symptoms of depression are starting to last most all day and this has happened for several days.     You may have suicidal thoughts but they are not constant.   What you need to do:     1. Call your care team, your response to treatment will improve if you keep your care team informed of your progress. Yellow periods are signs an adjustment may need to be made.     2. Continue your self-care, even if you have to fake it!    3. Talk to someone in your support network    4. Open up your depression survival kit           RED    ZONE Medical Alert - Get Help    What it looks like:     Depression is seriously interfering with your life.     You may experience these or other symptoms: You can t get out of bed most days, can t work or engage in other necessary activities, you have trouble taking care of basic hygiene, or basic responsibilities, thoughts of suicide or death that will not go away, self-injurious behavior.     What you need to do:  1. Call your care team and  request a same-day appointment. If they are not available (weekends or after hours) call your local crisis line, emergency room or 911.            Depression Self Care Plan / Survival Kit    Self-Care for Depression  Here s the deal. Your body and mind are really not as separate as most people think.  What you do and think affects how you feel and how you feel influences what you do and think. This means if you do things that people who feel good do, it will help you feel better.  Sometimes this is all it takes.  There is also a place for medication and therapy depending on how severe your depression is, so be sure to consult with your medical provider and/ or Behavioral Health Consultant if your symptoms are worsening or not improving.     In order to better manage my stress, I will:    Exercise  Get some form of exercise, every day. This will help reduce pain and release endorphins, the  feel good  chemicals in your brain. This is almost as good as taking antidepressants!  This is not the same as joining a gym and then never going! (they count on that by the way ) It can be as simple as just going for a walk or doing some gardening, anything that will get you moving.      Hygiene   Maintain good hygiene (Get out of bed in the morning, Make your bed, Brush your teeth, Take a shower, and Get dressed like you were going to work, even if you are unemployed).  If your clothes don't fit try to get ones that do.    Diet  I will strive to eat foods that are good for me, drink plenty of water, and avoid excessive sugar, caffeine, alcohol, and other mood-altering substances.  Some foods that are helpful in depression are: complex carbohydrates, B vitamins, flaxseed, fish or fish oil, fresh fruits and vegetables.    Psychotherapy  I agree to participate in Individual Therapy (if recommended).    Medication  If prescribed medications, I agree to take them.  Missing doses can result in serious side effects.  I understand that  drinking alcohol, or other illicit drug use, may cause potential side effects.  I will not stop my medication abruptly without first discussing it with my provider.    Staying Connected With Others  I will stay in touch with my friends, family members, and my primary care provider/team.    Use your imagination  Be creative.  We all have a creative side; it doesn t matter if it s oil painting, sand castles, or mud pies! This will also kick up the endorphins.    Witness Beauty  (AKA stop and smell the roses) Take a look outside, even in mid-winter. Notice colors, textures. Watch the squirrels and birds.     Service to others  Be of service to others.  There is always someone else in need.  By helping others we can  get out of ourselves  and remember the really important things.  This also provides opportunities for practicing all the other parts of the program.    Humor  Laugh and be silly!  Adjust your TV habits for less news and crime-drama and more comedy.    Control your stress  Try breathing deep, massage therapy, biofeedback, and meditation. Find time to relax each day.     My support system    Clinic Contact:  Phone number:    Contact 1:  Phone number:    Contact 2:  Phone number:    Sabianist/:  Phone number:    Therapist:  Phone number:    Local crisis center:    Phone number:    Other community support:  Phone number:

## 2019-05-28 ENCOUNTER — OFFICE VISIT (OUTPATIENT)
Dept: FAMILY MEDICINE | Facility: CLINIC | Age: 44
End: 2019-05-28
Payer: COMMERCIAL

## 2019-05-28 VITALS
OXYGEN SATURATION: 100 % | TEMPERATURE: 98.3 F | SYSTOLIC BLOOD PRESSURE: 126 MMHG | BODY MASS INDEX: 27.76 KG/M2 | DIASTOLIC BLOOD PRESSURE: 86 MMHG | WEIGHT: 188 LBS | HEART RATE: 78 BPM

## 2019-05-28 DIAGNOSIS — F51.04 PSYCHOPHYSIOLOGICAL INSOMNIA: ICD-10-CM

## 2019-05-28 DIAGNOSIS — F41.1 GAD (GENERALIZED ANXIETY DISORDER): ICD-10-CM

## 2019-05-28 DIAGNOSIS — Z11.4 SCREENING FOR HIV (HUMAN IMMUNODEFICIENCY VIRUS): ICD-10-CM

## 2019-05-28 DIAGNOSIS — Z91.89 10 YEAR RISK OF MI OR STROKE < 7.5%: ICD-10-CM

## 2019-05-28 DIAGNOSIS — R73.02 IGT (IMPAIRED GLUCOSE TOLERANCE): ICD-10-CM

## 2019-05-28 DIAGNOSIS — E78.1 HIGH TRIGLYCERIDES: ICD-10-CM

## 2019-05-28 DIAGNOSIS — E78.6 HDL DEFICIENCY: ICD-10-CM

## 2019-05-28 DIAGNOSIS — Z83.3 FAMILY HISTORY OF DIABETES MELLITUS: ICD-10-CM

## 2019-05-28 DIAGNOSIS — Z00.00 ROUTINE GENERAL MEDICAL EXAMINATION AT A HEALTH CARE FACILITY: Primary | ICD-10-CM

## 2019-05-28 PROCEDURE — 99396 PREV VISIT EST AGE 40-64: CPT | Performed by: FAMILY MEDICINE

## 2019-05-28 RX ORDER — PAROXETINE 20 MG/1
20 TABLET, FILM COATED ORAL DAILY
Qty: 90 TABLET | Refills: 3 | Status: SHIPPED | OUTPATIENT
Start: 2019-05-28 | End: 2020-06-04

## 2019-05-28 RX ORDER — TRAZODONE HYDROCHLORIDE 50 MG/1
50 TABLET, FILM COATED ORAL
Qty: 90 TABLET | Refills: 1 | Status: SHIPPED | OUTPATIENT
Start: 2019-05-28 | End: 2020-12-16

## 2019-05-28 ASSESSMENT — ANXIETY QUESTIONNAIRES
IF YOU CHECKED OFF ANY PROBLEMS ON THIS QUESTIONNAIRE, HOW DIFFICULT HAVE THESE PROBLEMS MADE IT FOR YOU TO DO YOUR WORK, TAKE CARE OF THINGS AT HOME, OR GET ALONG WITH OTHER PEOPLE: SOMEWHAT DIFFICULT
3. WORRYING TOO MUCH ABOUT DIFFERENT THINGS: SEVERAL DAYS
7. FEELING AFRAID AS IF SOMETHING AWFUL MIGHT HAPPEN: SEVERAL DAYS
2. NOT BEING ABLE TO STOP OR CONTROL WORRYING: SEVERAL DAYS
1. FEELING NERVOUS, ANXIOUS, OR ON EDGE: SEVERAL DAYS
6. BECOMING EASILY ANNOYED OR IRRITABLE: SEVERAL DAYS
5. BEING SO RESTLESS THAT IT IS HARD TO SIT STILL: NOT AT ALL
GAD7 TOTAL SCORE: 6

## 2019-05-28 ASSESSMENT — ENCOUNTER SYMPTOMS
DIZZINESS: 0
HEMATURIA: 0
DIARRHEA: 0
NERVOUS/ANXIOUS: 0
SORE THROAT: 0
COUGH: 0
MYALGIAS: 0
HEADACHES: 0
FEVER: 0
DYSURIA: 0
EYE PAIN: 0
PARESTHESIAS: 0
PALPITATIONS: 0
WEAKNESS: 0
ARTHRALGIAS: 0
JOINT SWELLING: 0
HEARTBURN: 0
ABDOMINAL PAIN: 0
CHILLS: 0
SHORTNESS OF BREATH: 0
CONSTIPATION: 0
NAUSEA: 0
FREQUENCY: 0
HEMATOCHEZIA: 0

## 2019-05-28 ASSESSMENT — PAIN SCALES - GENERAL: PAINLEVEL: NO PAIN (0)

## 2019-05-28 ASSESSMENT — PATIENT HEALTH QUESTIONNAIRE - PHQ9
SUM OF ALL RESPONSES TO PHQ QUESTIONS 1-9: 8
5. POOR APPETITE OR OVEREATING: SEVERAL DAYS

## 2019-05-28 NOTE — LETTER
[facility]  [address] [facility] [id number] [phone]        August 2, 2019    Ignacio Poole  [address] [facility] [address]              Dear Ignacio Poole    This is to remind you that your Fasting lab is due.    You may call our office at [phone] to schedule an appointment.    Please disregard this notice if you have already had your labs drawn or made an appointment.        Sincerely,        Matthieu Monzon MD

## 2019-05-28 NOTE — NURSING NOTE
"Chief Complaint   Patient presents with     Physical     Health Maintenance     order pended, phq9       Initial /78   Pulse 78   Temp 98.3  F (36.8  C) (Oral)   Wt 85.3 kg (188 lb)   SpO2 100%   BMI 27.76 kg/m   Estimated body mass index is 27.76 kg/m  as calculated from the following:    Height as of 4/8/19: 1.753 m (5' 9\").    Weight as of this encounter: 85.3 kg (188 lb).  Medication Reconciliation: complete    DEANNA Pitts MA    "

## 2019-05-28 NOTE — PATIENT INSTRUCTIONS
Preventive Health Recommendations  Male Ages 40 to 49    Yearly exam:             See your health care provider every year in order to  o   Review health changes.   o   Discuss preventive care.    o   Review your medicines if your doctor has prescribed any.    You should be tested each year for STDs (sexually transmitted diseases) if you re at risk.     Have a cholesterol test every 5 years.     Have a colonoscopy (test for colon cancer) if someone in your family has had colon cancer or polyps before age 50.     After age 45, have a diabetes test (fasting glucose). If you are at risk for diabetes, you should have this test every 3 years.      Talk with your health care provider about whether or not a prostate cancer screening test (PSA) is right for you.    Shots: Get a flu shot each year. Get a tetanus shot every 10 years.     Nutrition:    Eat at least 5 servings of fruits and vegetables daily.     Eat whole-grain bread, whole-wheat pasta and brown rice instead of white grains and rice.     Get adequate Calcium and Vitamin D.     Lifestyle    Exercise for at least 150 minutes a week (30 minutes a day, 5 days a week). This will help you control your weight and prevent disease.     Limit alcohol to one drink per day.     No smoking.     Wear sunscreen to prevent skin cancer.     See your dentist every six months for an exam and cleaning.     Please schedule a future laboratory appointment(s) and be sure to have fasted for 10 hours prior to arrival               Patient Education     Understanding Vasectomy  Vasectomy is a simple, safe procedure that makes a man sterile (unable to father a child). It is the most effective birth control method for men.  Your reproductive system  For pregnancy to occur, a man s sperm (male reproductive cells) must join with a woman s egg. To understand how a vasectomy works, you need to know how sperm are produced, stored, and released by the body:    The urethra is the tube in the  center of the penis. It transports both urine and semen. When you have an orgasm, semen is ejaculated out of the urethra.    The seminal vesicles and the prostate gland secrete fluids called semen. This sticky, white fluid helps nourish sperm and carry them along.    The epididymis is a coiled tube that holds the sperm while they mature.    The scrotum is a pouch of skin that contains the testes.    The testes are glands that produce sperm and male hormones.    The vas deferens are tubes that carry the sperm from the epididymis to the penis.    Sperm (shown magnified) carry genetic material.     How a vasectomy works  During the procedure, the 2 vas deferens are cut and sealed off. This prevents sperm from traveling from the testes to the penis. It is the only change in your reproductive system. The testes still produce sperm. But since the sperm have nowhere to go, they die and are absorbed by your body. Only a very small amount of semen is made up of sperm. So after a vasectomy, your semen won t look or feel any different.  Keep in mind  After a vasectomy, some active sperm still remain in the reproductive system. It will take about 3 months and numerous ejaculations before the semen is completely free of sperm. Until then, you ll need to use another form of birth control.   Date Last Reviewed: 1/1/2017 2000-2018 [employer]. [address] [place] [address], [place] [address]. All rights reserved. This information is not intended as a substitute for professional medical care. Always follow your healthcare professional's instructions.           Patient Education     Vasectomy: Risks and Complications  A vasectomy is an outpatient procedure. This means you ll go home the same day. It s done in a doctor s office, clinic, or hospital. Before your procedure, you ll be asked to read and sign a consent form. This form states you re aware of the possible risks and complications. It also says that you understand  that the procedure, though most often successful, can t promise to make you sterile. Be sure you have all your questions answered before you sign this form. Below is a list of risks and possible complications of the procedure.  Risks and possible complications of vasectomy  Vasectomy is safe. But it does have risks. They include the following:    Bleeding or infection    Sperm granuloma. This is a small, harmless lump. It may form where the vas deferens is sealed off.    Sperm buildup (congestion). This may cause soreness in the testes. Anti-inflammatory medicine can provide relief.    Epididymitis. This is inflammation that may cause scrotal aching. It often goes away without treatment. Anti-inflammatory medicine can provide relief.    Reconnection of the vas deferens. This can occur in rare cases. It makes you fertile again. This may result in an unplanned pregnancy.    Sperm antibodies. Developing antibodies is a common response of your body to the absorbed sperm. The antibodies can make you sterile. This is true even if you later try to reverse your vasectomy.    Long-term testicular discomfort. This may occur after surgery. But it s very rare.   Date Last Reviewed: 1/1/2017 2000-2018 [employer]. [address] [place] [address], [place] [address]. All rights reserved. This information is not intended as a substitute for professional medical care. Always follow your healthcare professional's instructions.

## 2019-05-28 NOTE — PROGRESS NOTES
SUBJECTIVE:   CC: Ignacio Poole is an 43 year old male who presents for preventative health visit.     Healthy Habits:     Getting at least 3 servings of Calcium per day:  Yes    Bi-annual eye exam:  NO    Dental care twice a year:  NO    Sleep apnea or symptoms of sleep apnea:  None    Diet:  Carbohydrate counting    Frequency of exercise:  1 day/week    Duration of exercise:  15-30 minutes    Taking medications regularly:  Yes    Medication side effects:  None    PHQ-2 Total Score: 2    Additional concerns today:  Yes          Today's PHQ-2 Score:   PHQ-2 ( 1999 [employer]) 5/28/2019   Q1: Little interest or pleasure in doing things 1   Q2: Feeling down, depressed or hopeless 1   PHQ-2 Score 2   Q1: Little interest or pleasure in doing things Several days   Q2: Feeling down, depressed or hopeless Several days   PHQ-2 Score 2       Abuse: Current or Past(Physical, Sexual or Emotional)- No  Do you feel safe in your environment? Yes    Social History     Tobacco Use     Smoking status: Never Smoker     Smokeless tobacco: Never Used   Substance Use Topics     Alcohol use: No     If you drink alcohol do you typically have >3 drinks per day or >7 drinks per week? No    Alcohol Use 5/28/2019   Prescreen: >3 drinks/day or >7 drinks/week? Not Applicable   Prescreen: >3 drinks/day or >7 drinks/week? -   No flowsheet data found.    Last PSA: No results found for: PSA    Reviewed orders with patient. Reviewed health maintenance and updated orders accordingly - Yes      Reviewed and updated as needed this visit by clinical staff  Tobacco  Allergies  Meds  Med Hx  Surg Hx  Fam Hx  Soc Hx        Reviewed and updated as needed this visit by Provider            Review of Systems   Constitutional: Negative for chills and fever.   HENT: Negative for congestion, ear pain, hearing loss and sore throat.    Eyes: Negative for pain and visual disturbance.   Respiratory: Negative for cough and shortness of breath.    Cardiovascular:  Negative for chest pain, palpitations and peripheral edema.   Gastrointestinal: Negative for abdominal pain, constipation, diarrhea, heartburn, hematochezia and nausea.   Genitourinary: Negative for discharge, dysuria, frequency, genital sores, hematuria, impotence and urgency.   Musculoskeletal: Negative for arthralgias, joint swelling and myalgias.   Skin: Negative for rash.   Neurological: Negative for dizziness, weakness, headaches and paresthesias.   Psychiatric/Behavioral: Negative for mood changes. The patient is not nervous/anxious.          OBJECTIVE:   /86   Pulse 78   Temp 98.3  F (36.8  C) (Oral)   Wt 85.3 kg (188 lb)   SpO2 100%   BMI 27.76 kg/m      Physical Exam      Diagnostic Test Results:  Labs reviewed in Epic    ASSESSMENT/PLAN:       ICD-10-CM    1. Routine general medical examination at a health care facility Z00.00 Lipid panel reflex to direct LDL Fasting     Comprehensive metabolic panel   2. CLAUDINE (generalized anxiety disorder) F41.1 PARoxetine (PAXIL) 20 MG tablet   3. Insomnia G47.00    4. Screening for HIV (human immunodeficiency virus) Z11.4 HIV Screening   5. Family history of diabetes mellitus Z83.3 Comprehensive metabolic panel   6. High triglycerides E78.1 Lipid panel reflex to direct LDL Fasting     Comprehensive metabolic panel   7. HDL deficiency E78.6 Lipid panel reflex to direct LDL Fasting     Comprehensive metabolic panel   8. 10 year risk of MI or stroke < 7.5%, 1.1% in 2017 Z91.89 Lipid panel reflex to direct LDL Fasting     Comprehensive metabolic panel   9. IGT (impaired glucose tolerance) R73.02 Comprehensive metabolic panel   10. Psychophysiological insomnia F51.04 traZODone (DESYREL) 50 MG tablet       COUNSELING:   Reviewed preventive health counseling, as reflected in patient instructions       Regular exercise       Healthy diet/nutrition       Vision screening       Family planning       HIV screeninx in teen years, 1x in adult years, and at  "intervals if high risk       Osteoporosis Prevention/Bone Health    Estimated body mass index is 27.76 kg/m  as calculated from the following:    Height as of 4/8/19: 1.753 m (5' 9\").    Weight as of this encounter: 85.3 kg (188 lb).          reports that he has never smoked. He has never used smokeless tobacco.      Counseling Resources:  ATP IV Guidelines  Pooled Cohorts Equation Calculator  FRAX Risk Assessment  ICSI Preventive Guidelines  Dietary Guidelines for Americans, 2010  USDA's MyPlate  ASA Prophylaxis  Lung CA Screening    Matthieu Monzon MD  [facility]    --------------------------------------------------------------------------------------------------------------------------------------  SUBJECTIVE:  Ignacio Poole is a 43 year old male who presents to the clinic today for a routine physical exam.    The patient's last physical was 13 months ago.     Cholesterol   Date Value Ref Range Status   04/11/2017 160 <200 mg/dL Final   11/06/2014 170 <200 mg/dL Final     Comment:     LDL Cholesterol is the primary guide to therapy.   The NCEP recommends further evaluation of: patients with cholesterol greater   than 200 mg/dL if additional risk factors are present, cholesterol greater   than   240 mg/dL, triglycerides greater than 150 mg/dL, or HDL less than 40 mg/dL.       HDL Cholesterol   Date Value Ref Range Status   04/11/2017 40 >39 mg/dL Final   11/06/2014 31 (L) >40 mg/dL Final     LDL Cholesterol Calculated   Date Value Ref Range Status   04/11/2017 72 <100 mg/dL Final     Comment:     Desirable:       <100 mg/dl   11/06/2014 73 0 - 129 mg/dL Final     Comment:     LDL Cholesterol is the primary guide to therapy: LDL-cholesterol goal in high   risk patients is <100 mg/dL and in very high risk patients is <70 mg/dL.       Triglycerides   Date Value Ref Range Status   04/11/2017 238 (H) <150 mg/dL Final     Comment:     Borderline high:  150-199 mg/dl   High:             200-499 " mg/dl   Very high:       >499 mg/dl   Fasting specimen     11/06/2014 332 (H) 0 - 150 mg/dL Final     Comment:     Fasting specimen     Cholesterol/HDL Ratio   Date Value Ref Range Status   11/06/2014 5.5 (H) 0.0 - 5.0 Final   06/04/2004 5.3@       The patient's last fasting lipid panel was done 2 years ago and the results are listed above.      The 10-year ASCVD risk score (Bonita FLOYD Jr., et al., 2013) is: 1.4%    Values used to calculate the score:      Age: 43 years      Sex: Male      Is Non- : No      Diabetic: No      Tobacco smoker: No      Systolic Blood Pressure: 126 mmHg      Is BP treated: No      HDL Cholesterol: 40 mg/dL      Total Cholesterol: 160 mg/dL        The patient reports that he has never been treated for high blood pressure.    The patient reports that he does not take a daily aspirin.    No results found for: HCVAB  The patient reports that he has not been screened for Hepatitis C    (Screen all baby boomers once per CDC-- the generation born from 1945 through 1965)  He would not like to have an Hepatitis C test today    No results found for: HIAGAB  The patient reports that he has not been screened for HIV   (Screen all 15 to 64 years old)  He would like to have an HIV test today      Immunization History   Administered Date(s) Administered     Influenza (IIV3) PF 11/12/2011, 10/29/2012     Influenza Vaccine IM 3yrs+ 4 Valent IIV4 12/17/2013     TD (ADULT, 7+) 06/04/2004     TDAP Vaccine (Adacel) 02/22/2013     The patient's believes that his last tetanus shot was given 6 year(s) ago.   The patient believes that he has not had a Shingrix in the past  The patient believes that he has not had a PPSV23 in the past.  The patient believes that he has not had a PCV13 in the past.  The patient believes that he has not had a seasonal flu vaccination this fall or winter.  The patient would like to have a no vaccinations today      No results found for this or any previous  visit.]   The patient denies a family history of colon cancer.  The patient reports that he has not had a colonoscopy.    The patient reports that he does performs a self testicular exam monthly.  His currently used contraception is condoms. He is  interested in a vasectomy in the near future.  The patient reports  a family history of diabetes in his grandfather.    The patient reports that he eats or drinks 2-3 servings of dairy products per day. He does not take a calcium supplement at all.  The patient reports that he has not had a dental appointments for approximately 7 years. He now has dental insurance and will set up an appointment(s).    The patient reports that he has an eye examination approximately every 2 year(s).    Do you currently smoke? No  How many years have you smoked? 0   How many packs per day did you smoke on average? N/A  (if more than 30 pack year history and the patient is age 55-80 consider ordering an annual low dose radiation lung CT to screen for cancer)  (Do not order if patient has quit more than 15 years ago or has a health condition that limits life expectancy or could not tolerate curative lung surgery)  Are you interested having a lung CT to screen for lung cancer? N/A    If the patient has smoked more that 100 cigarettes, has the patient had an imaging study (US or CT) for an AAA between the ages of 65 and 75? N/A              Patient Active Problem List   Diagnosis     CARDIOVASCULAR SCREENING; LDL GOAL LESS THAN 160     Insomnia     Eczema     IGT (impaired glucose tolerance)     High triglycerides     HDL deficiency     10 year risk of MI or stroke < 7.5%, 1.1% in April 2017     Other migraine without status migrainosus, not intractable       Past Surgical History:   Procedure Laterality Date     BIOPSY  youth    lymph node neck     BIOPSY  8/27/85    lymph node neck       Family History   Problem Relation Age of Onset     Gastrointestinal Disease Father         crohns      Asthma Father      Genetic Disorder Father         crohns disease     Prostate Cancer Paternal Grandfather      Diabetes Paternal Grandfather      Gastrointestinal Disease Sister         crohns     Asthma Sister      Genetic Disorder Sister         crohns disease       Social History     Socioeconomic History     Marital status:      Spouse name: Not on file     Number of children: Not on file     Years of education: Not on file     Highest education level: Not on file   Occupational History     Not on file   Social Needs     Financial resource strain: Not on file     Food insecurity:     Worry: Not on file     Inability: Not on file     Transportation needs:     Medical: Not on file     Non-medical: Not on file   Tobacco Use     Smoking status: Never Smoker     Smokeless tobacco: Never Used   Substance and Sexual Activity     Alcohol use: No     Drug use: No     Sexual activity: Yes     Partners: Female     Birth control/protection: Abstinence, Condom     Comment: interested in male surgical   Lifestyle     Physical activity:     Days per week: Not on file     Minutes per session: Not on file     Stress: Not on file   Relationships     Social connections:     Talks on phone: Not on file     Gets together: Not on file     Attends Anabaptist service: Not on file     Active member of club or organization: Not on file     Attends meetings of clubs or organizations: Not on file     Relationship status: Not on file     Intimate partner violence:     Fear of current or ex partner: Not on file     Emotionally abused: Not on file     Physically abused: Not on file     Forced sexual activity: Not on file   Other Topics Concern     Parent/sibling w/ CABG, MI or angioplasty before 65F 55M? No   Social History Narrative     Not on file       Current Outpatient Medications   Medication Sig Dispense Refill     butalbital-acetaminophen-caffeine (FIORICET/ESGIC) -40 MG tablet Take 1 tablet by mouth every 4 hours as  needed 30 tablet 1     ibuprofen (ADVIL,MOTRIN) 200 MG tablet Take 200 mg by mouth every 4 hours as needed Reported on 4/6/2017       PARoxetine (PAXIL) 20 MG tablet Take 1 tablet (20 mg) by mouth daily 17 tablet 0     traZODone (DESYREL) 50 MG tablet Take 1 tablet (50 mg) by mouth At Bedtime 90 tablet 1       PHYSICAL EXAMINATION:  Blood pressure 126/86, pulse 78, temperature 98.3  F (36.8  C), temperature source Oral, weight 85.3 kg (188 lb), SpO2 100 %.  General appearance - healthy, alert and no distress  Skin - Skin color, texture, turgor normal. No rashes or lesions.  Head - Normocephalic. No masses, lesions, tenderness or abnormalities  Eyes - conjunctivae/corneas clear. PERRL, EOM's intact. Fundi benign  Ears - External ears normal. Canals clear. TM's normal.  Nose/Sinuses - Nares normal. Septum midline. Mucosa normal. No drainage or sinus tenderness.  Oropharynx - Lips, mucosa, and tongue normal. Teeth and gums normal.  Neck - Neck supple. No adenopathy. Thyroid symmetric, normal size,  Lungs - Percussion normal. Good diaphragmatic excursion. Lungs clear  Heart - PMI normal. No lifts, heaves, or thrills. RRR. No murmurs, clicks gallops or rub  Abdomen - Abdomen soft, non-tender. BS normal. No masses, organomegaly  Extremities - Extremities normal. No deformities, edema, or skin discoloration.  Musculoskeletal - Spine ROM normal. Muscular strength intact.  Peripheral pulses - radial=4/4, femoral=4/4, popliteal=4/4, dorsalis pedis=4/4,  Neuro - Gait normal. Reflexes normal and symmetric. Sensation grossly WNL.  Genitalia - Penis normal. No urethral discharge. Scrotum normal to palpation. No hernia.  Rectal - Rectal negative. Prostate palpation negative.  No rectal masses or abnormalities      Orders Only on 04/11/2017   Component Date Value Ref Range Status     Cholesterol 04/11/2017 160  <200 mg/dL Final     Triglycerides 04/11/2017 238* <150 mg/dL Final    Comment: Borderline high:  150-199 mg/dl   High:              200-499 mg/dl   Very high:       >499 mg/dl   Fasting specimen       HDL Cholesterol 04/11/2017 40  >39 mg/dL Final     LDL Cholesterol Calculated 04/11/2017 72  <100 mg/dL Final    Desirable:       <100 mg/dl     Non HDL Cholesterol 04/11/2017 120  <130 mg/dL Final     Sodium 04/11/2017 142  133 - 144 mmol/L Final     Potassium 04/11/2017 4.0  3.4 - 5.3 mmol/L Final     Chloride 04/11/2017 104  94 - 109 mmol/L Final     Carbon Dioxide 04/11/2017 30  20 - 32 mmol/L Final     Anion Gap 04/11/2017 8  3 - 14 mmol/L Final     Glucose 04/11/2017 105* 70 - 99 mg/dL Final    Fasting specimen     Urea Nitrogen 04/11/2017 10  7 - 30 mg/dL Final     Creatinine 04/11/2017 1.18  0.66 - 1.25 mg/dL Final     GFR Estimate 04/11/2017 68  >60 mL/min/1.7m2 Final    Non  GFR Calc     GFR Estimate If Black 04/11/2017 82  >60 mL/min/1.7m2 Final    African American GFR Calc     Calcium 04/11/2017 9.1  8.5 - 10.1 mg/dL Final     Bilirubin Total 04/11/2017 2.9* 0.2 - 1.3 mg/dL Final     Albumin 04/11/2017 4.0  3.4 - 5.0 g/dL Final     Protein Total 04/11/2017 6.9  6.8 - 8.8 g/dL Final     Alkaline Phosphatase 04/11/2017 78  40 - 150 U/L Final     ALT 04/11/2017 28  0 - 70 U/L Final     AST 04/11/2017 19  0 - 45 U/L Final       ASSESSMENT:    ICD-10-CM    1. Routine general medical examination at a health care facility Z00.00        Well-Adult Physical Exam.  Health Maintenance Due   Topic Date Due     HIV SCREENING  08/27/1990     PREVENTIVE CARE VISIT  04/16/2019     Health Maintenance   Topic Date Due     HIV SCREENING  08/27/1990     PREVENTIVE CARE VISIT  04/16/2019     INFLUENZA VACCINE (Season Ended) 09/01/2019     PHQ-9  10/08/2019     LIPID  04/11/2022     DTAP/TDAP/TD IMMUNIZATION (3 - Td) 02/22/2023     ZOSTER IMMUNIZATION (1 of 2) 08/27/2025     DEPRESSION ACTION PLAN  Completed     MIGRAINE ACTION PLAN  Completed     PHQ-2  Completed     IPV IMMUNIZATION  Aged Out     MENINGITIS IMMUNIZATION  Aged Out          HEALTH CARE MAINTENENCE: The recommended screening tests and vaccinatons for this patient have been discussed as above.  The appropriate tests and vaccinations  have been ordered or declined by the patient. Please see the orders in EPIC.The patient specifically declines: n/a     Immunization Status:  up to date and documented    Patient Active Problem List   Diagnosis     CARDIOVASCULAR SCREENING; LDL GOAL LESS THAN 160     Insomnia     Eczema     IGT (impaired glucose tolerance)     High triglycerides     HDL deficiency     10 year risk of MI or stroke < 7.5%, 1.1% in April 2017     Other migraine without status migrainosus, not intractable        ATP III Guidelines  ICSI Preventive Guidelines    PLAN:   The patient will make a future lab appointment for all bloodwork as they are not fasting today  Check a fasting lipid profile  HIV testing was discussed and ordered  Testicular self-exam encouraged  Check a fasting glucose  Discussed calcium intake, vitamins and supplements. Recommended 1000 mg of calcium daily  Sunscreen use was recommended especially in the area of tatoos  Refills on chronic medication given  Recommended dental exams every 6 months  Recommended eye exam every 1-2 years  Follow up in 1 year for the next preventative medical visit    Follow up for vasectomy consultation when ready        Body mass index is 27.76 kg/m .

## 2019-05-29 RX ORDER — PAROXETINE 20 MG/1
TABLET, FILM COATED ORAL
Qty: 17 TABLET | Refills: 0 | OUTPATIENT
Start: 2019-05-29

## 2019-05-29 ASSESSMENT — ANXIETY QUESTIONNAIRES: GAD7 TOTAL SCORE: 6

## 2019-06-04 ENCOUNTER — TRANSFERRED RECORDS (OUTPATIENT)
Dept: HEALTH INFORMATION MANAGEMENT | Facility: CLINIC | Age: 44
End: 2019-06-04

## 2019-07-09 ENCOUNTER — TRANSFERRED RECORDS (OUTPATIENT)
Dept: HEALTH INFORMATION MANAGEMENT | Facility: CLINIC | Age: 44
End: 2019-07-09

## 2019-08-06 ENCOUNTER — TRANSFERRED RECORDS (OUTPATIENT)
Dept: HEALTH INFORMATION MANAGEMENT | Facility: CLINIC | Age: 44
End: 2019-08-06

## 2019-09-03 ENCOUNTER — DOCUMENTATION ONLY (OUTPATIENT)
Dept: LAB | Facility: CLINIC | Age: 44
End: 2019-09-03

## 2019-09-03 DIAGNOSIS — Z13.6 CARDIOVASCULAR SCREENING; LDL GOAL LESS THAN 160: Primary | ICD-10-CM

## 2019-09-03 DIAGNOSIS — E78.6 HDL DEFICIENCY: ICD-10-CM

## 2019-09-03 DIAGNOSIS — R73.02 IGT (IMPAIRED GLUCOSE TOLERANCE): ICD-10-CM

## 2019-09-03 DIAGNOSIS — E78.1 HIGH TRIGLYCERIDES: ICD-10-CM

## 2019-09-03 DIAGNOSIS — Z11.4 SCREENING FOR HIV (HUMAN IMMUNODEFICIENCY VIRUS): ICD-10-CM

## 2019-09-04 NOTE — PROGRESS NOTES
A letter was sent to this patient on 2019 for overdue lab work. Orders were, LIPID, HIV, CMP. The patient has not made a lab appoinment. Labs on this patient have  and have been canceled.   If you would like this lab work done, please have your team contact the patient to come in for a lab only appointment and place new Future orders.    Thank you,   Dorys Patel MLT ([facility])

## 2019-09-04 NOTE — PROGRESS NOTES
Please review and sign lab orders.  The patient was in to see you for a physical on 5/28/19 and I spoke to him and he wants to come in for his fasting labs.  Lab 9/6/19  Jennifer Pillai,

## 2019-09-17 ENCOUNTER — TRANSFERRED RECORDS (OUTPATIENT)
Dept: HEALTH INFORMATION MANAGEMENT | Facility: CLINIC | Age: 44
End: 2019-09-17

## 2019-09-28 ENCOUNTER — HEALTH MAINTENANCE LETTER (OUTPATIENT)
Age: 44
End: 2019-09-28

## 2019-12-13 ENCOUNTER — MYC MEDICAL ADVICE (OUTPATIENT)
Dept: FAMILY MEDICINE | Facility: CLINIC | Age: 44
End: 2019-12-13

## 2019-12-20 ENCOUNTER — OFFICE VISIT (OUTPATIENT)
Dept: FAMILY MEDICINE | Facility: CLINIC | Age: 44
End: 2019-12-20
Payer: COMMERCIAL

## 2019-12-20 VITALS
DIASTOLIC BLOOD PRESSURE: 82 MMHG | OXYGEN SATURATION: 96 % | BODY MASS INDEX: 29.09 KG/M2 | TEMPERATURE: 98.7 F | HEART RATE: 72 BPM | RESPIRATION RATE: 18 BRPM | SYSTOLIC BLOOD PRESSURE: 145 MMHG | WEIGHT: 197 LBS

## 2019-12-20 DIAGNOSIS — Z30.09 ENCOUNTER FOR VASECTOMY COUNSELING: Primary | ICD-10-CM

## 2019-12-20 PROCEDURE — 99213 OFFICE O/P EST LOW 20 MIN: CPT | Performed by: FAMILY MEDICINE

## 2019-12-20 RX ORDER — DIAZEPAM 10 MG
TABLET ORAL
Qty: 1 TABLET | Refills: 0 | Status: SHIPPED | OUTPATIENT
Start: 2019-12-20 | End: 2020-01-23

## 2019-12-20 ASSESSMENT — PAIN SCALES - GENERAL: PAINLEVEL: NO PAIN (0)

## 2019-12-20 NOTE — PROGRESS NOTES
SUBJECTIVE:  Ignacio Poole is a 44 year old male who presents for a vasectomy consultation.  The patient was given a pamphlet of information to read in the exam room prior to the consultation. He reports that the is very confident that he does not want to have any more children. At this time, he and his partner are using condoms for contraception    With the diagrams in his pamphlet of information, I explained how the procedure is performed in detail. I first  tape the penis up against the abdomen, shave the anterior scrotum with a safety razor and create a sterile field using betadine and sterile drapes. The next step involves palpating the left vas deferens and bringing it forward to the anterior scrotum. Lidocaine without epinephrine is then injected to create a local skin block and also higher up around the vas deferens to create a vas block. Next, an incision is made in the anterior scrotum measuring approximately 1-2 cm in length.  The vas deferens is dissected away from the fascia and a large a loop as possible is brought up through the incision. Each end is tied with an absorbable suture of 4-O dexon and the loop is transected at the proximal and distal ends with electroautery. The two ends are then cauterized further and the wound is observed for bleeding. Any bleeding will be controlled with electrocuatery. Next the incision will be closed with a horizontal mattress suture of 3-O Vicryl.     We next discussed the risks of vasectomy. The risks discussed included:    -Bleeding at the time of the procedure or later including hematoma development.  -Infection  -Postoperative discomfort  -Development of a sperm granuloma which is a lump that can form at the site of the transection of the vas deferens.  -Sperm buildup in the testicles that may cause a sensation of congestion or discomfort. which is usually responsive to a non steroidal anti-inflammatory drug.  -Epididymitis can also occur and can cause  "scrotal discomfort.  -Reconnection of the vas deferans which can occur in up to 1 in 1000 cases per the literature.  -Development of sperm antibodies which may leave a man infertile despite having a reversal of the vasectomy later.  - Long term testicular discomfort which is very rare.      Also mentioned to the patient that there is some literature out there that suggests men who have vasectomies are at increased risk for prostate cancer; however, this literature is inconclusive and controversial.       OBJECTIVE:  On exam, this is a well-developed, well-nourished white male.  Weight is 197 lbs 0 oz.  Height is   Ht Readings from Last 2 Encounters:   04/08/19 1.753 m (5' 9\")   04/16/18 1.753 m (5' 9\")       Exam reveals a normal uncircumcised penis, no lesions.  Testes are descended bilaterally. Both vas deferens were easily identified by palpation.  No abnormalities were noted. Exam was limited to the genitalia    ASSESSMENT:  Undesired fertility in an adult male who is requesting a vasectomy.    PLAN:  He will schedule a vasectomy at his convenience. He was asked to take the 10 mg valium tablet one hour before the procedure. He was asked to wear warm layers of clothes on top and to wear warm socks.  He is aware that he will need to take several days off from work and any physical activity and essentially rest for two days with ice packs and ibuprofen for discomfort. He was also informed that he will need to bring a semen sample in 3 months to make sure that he has cleared the urinary tract  of any residual sperm and to make sure that he is sterile. He was informed that he should continue to use contraception until we have proven that he is sterile.                "

## 2019-12-20 NOTE — NURSING NOTE
"Chief Complaint   Patient presents with     Consult     vasectomy     Health Maintenance     order pended       Initial BP (!) 145/82   Pulse 72   Temp 98.7  F (37.1  C) (Oral)   Resp 18   Wt 89.4 kg (197 lb)   SpO2 96%   BMI 29.09 kg/m   Estimated body mass index is 29.09 kg/m  as calculated from the following:    Height as of 4/8/19: 1.753 m (5' 9\").    Weight as of this encounter: 89.4 kg (197 lb).  Medication Reconciliation: complete  Irais Gaytan CMA  "

## 2019-12-23 ENCOUNTER — OFFICE VISIT (OUTPATIENT)
Dept: FAMILY MEDICINE | Facility: CLINIC | Age: 44
End: 2019-12-23
Payer: COMMERCIAL

## 2019-12-23 VITALS
RESPIRATION RATE: 20 BRPM | TEMPERATURE: 98.6 F | WEIGHT: 199 LBS | HEART RATE: 88 BPM | DIASTOLIC BLOOD PRESSURE: 80 MMHG | BODY MASS INDEX: 29.39 KG/M2 | OXYGEN SATURATION: 97 % | SYSTOLIC BLOOD PRESSURE: 137 MMHG

## 2019-12-23 DIAGNOSIS — Z30.2 ENCOUNTER FOR VASECTOMY: Primary | ICD-10-CM

## 2019-12-23 PROCEDURE — 55250 REMOVAL OF SPERM DUCT(S): CPT | Performed by: FAMILY MEDICINE

## 2019-12-23 PROCEDURE — 99207 ZZC DROP WITH A PROCEDURE: CPT | Mod: 25 | Performed by: FAMILY MEDICINE

## 2019-12-23 PROCEDURE — 88302 TISSUE EXAM BY PATHOLOGIST: CPT | Performed by: FAMILY MEDICINE

## 2019-12-23 ASSESSMENT — PAIN SCALES - GENERAL: PAINLEVEL: NO PAIN (0)

## 2019-12-23 NOTE — NURSING NOTE
"Chief Complaint   Patient presents with     Vasectomy     Health Maintenance       Initial /80   Pulse 88   Temp 98.6  F (37  C) (Oral)   Resp 20   Wt 90.3 kg (199 lb)   SpO2 97%   BMI 29.39 kg/m   Estimated body mass index is 29.39 kg/m  as calculated from the following:    Height as of 4/8/19: 1.753 m (5' 9\").    Weight as of this encounter: 90.3 kg (199 lb).  Medication Reconciliation: complete  Irais Gaytan CMA  "

## 2019-12-23 NOTE — PATIENT INSTRUCTIONS
Semen Sample Collection Procedure for Semen Analysis:  To provide the most accurate semen analysis it is essential that the sample be produced at the lab. To provide an atmosphere conducive for specimen production, a TV/VCR and literature are available.     The following are some guidelines to follow for specimen collection:  1. Do not have intercourse or an ejaculation for 2 - 7 days before the day of semen analysis. Less than 2 days or greater than 7 days abstinence can influence semen quality.  2. Your wife/partner may accompany you if this will not hinder sample production. She may not facilitate sample production other than to assist in masturbation.  3. The semen sample must be collected by masturbation* into the sterile specimen container that has been provided. If you have difficulties with masturbation please consult with the technologist.  4. Specimens produced outside of [facility] will only be accepted if delivered by the man who produced the semen sample. There are no exceptions to this policy. Specimens delivered by any other individual will not be accepted. If arrangements have been made to produce the semen sample at home please follow the preceding instructions. The specimen should be kept at body temperature and delivered to [facility] within 1 hr after specimen production.   5. Unlabeled specimens and specimens in non-approved containers will not be accepted.  *In cases where specimen collection is unable to be produced by masturbation, a special condom collection kit can be used for home production.  Specific instructions for proper use will be provided by trained laboratory personnel.    If you have any questions concerning any of the above information or if you are unable to keep your appointment please notify us as soon as possible at [phone]. Thank you.        Scheduling      [phone]

## 2019-12-23 NOTE — PROGRESS NOTES
"SUBJECTIVE:  Ignacio Poole is a 44 year old male who presents for his vasectomy. The consent form was discussed and signed by the patient.    OBJECTIVE/Procedure Note  On exam, this is a well-developed, well-nourished male.  Weight is 199 lbs 0 oz.  Height is   Ht Readings from Last 2 Encounters:   04/08/19 1.753 m (5' 9\")   04/16/18 1.753 m (5' 9\")     Both vas deferens were easily identified by palpation through the scrotal skin.  No abnormalities were noted.  I first  taped the penis up against the abdomen and shaved the anterior scrotum with a safety razor. A sterile field was created using sterile gloves,betadine and sterile drapes. The left vas deferens was palpated and brought forward to the anterior scrotal skin. Lidocaine without epinephrine was injected to create a local skin block and also higher up around the vas deferens to create a nerve block. After a minute a towel clamp was used to grasp the vas deferens through the skin. Next, an incision was made overlying the vas deferens measuring approximately 2 cm in length. The vas deferens was dissected away from the fascia and a loop was brought up through the incision. Each end was tied with an absorbable suture of 4-O dexon and tagged with a clamp. The loop was transected at the proximal and distal ends with electroautery. The two ends were then cauterized further and the wound was observed for bleeding. Any bleeding was be controlled with electrocautery. Next the incision was closed with a horizontal mattress suture of 3-O Vicryl. Attention was then turned to the right side and a similar procedure was performed.    ASSESSMENT:  Undesired fertility in an adult male. Vasectomy was performed without complications.    PLAN:  He was reminded that he should  take several days off from work and any physical activity and essentially rest for two days with ice packs and ibuprofen for discomfort. He was also reminded that he will need to bring a semen sample in " 3 months to make sure that he has cleared the urinary tract  of any residual sperm and to make sure that he is sterile. He was reminded that he should continue to use contraception until we have proven that he is sterile.    Semen Sample Collection Procedure for Semen Analysis:  To provide the most accurate semen analysis it is essential that the sample be produced at the lab. To provide an atmosphere conducive for specimen production, a TV/VCR and literature are available.     The following are some guidelines to follow for specimen collection:  1. Do not have intercourse or an ejaculation for 2 - 7 days before the day of semen analysis. Less than 2 days or greater than 7 days abstinence can influence semen quality.  2. Your wife/partner may accompany you if this will not hinder sample production. She may not facilitate sample production other than to assist in masturbation.  3. The semen sample must be collected by masturbation* into the sterile specimen container that has been provided. If you have difficulties with masturbation please consult with the technologist.  4. Specimens produced outside of [facility] will only be accepted if delivered by the man who produced the semen sample. There are no exceptions to this policy. Specimens delivered by any other individual will not be accepted. If arrangements have been made to produce the semen sample at home please follow the preceding instructions. The specimen should be kept at body temperature and delivered to [facility] within 1 hr after specimen production.   5. Unlabeled specimens and specimens in non-approved containers will not be accepted.  *In cases where specimen collection is unable to be produced by masturbation, a special condom collection kit can be used for home production.  Specific instructions for proper use will be provided by trained laboratory personnel.    If you have any questions concerning any of the above information or if you are unable to keep your  appointment please notify us as soon as possible at [phone]. Thank you.        Scheduling      [phone]

## 2019-12-26 LAB — COPATH REPORT: NORMAL

## 2020-01-07 ENCOUNTER — MYC MEDICAL ADVICE (OUTPATIENT)
Dept: FAMILY MEDICINE | Facility: CLINIC | Age: 45
End: 2020-01-07

## 2020-01-14 ENCOUNTER — MYC MEDICAL ADVICE (OUTPATIENT)
Dept: FAMILY MEDICINE | Facility: CLINIC | Age: 45
End: 2020-01-14

## 2020-01-14 NOTE — TELEPHONE ENCOUNTER
Routed to provider as FYI regarding sx update and additional message below.  Erica Espinosa, KOREYN, RN

## 2020-01-23 ENCOUNTER — OFFICE VISIT (OUTPATIENT)
Dept: FAMILY MEDICINE | Facility: CLINIC | Age: 45
End: 2020-01-23
Payer: COMMERCIAL

## 2020-01-23 VITALS
DIASTOLIC BLOOD PRESSURE: 88 MMHG | HEART RATE: 69 BPM | TEMPERATURE: 100 F | SYSTOLIC BLOOD PRESSURE: 128 MMHG | BODY MASS INDEX: 29.83 KG/M2 | WEIGHT: 201.4 LBS | HEIGHT: 69 IN

## 2020-01-23 DIAGNOSIS — J02.9 PHARYNGITIS, UNSPECIFIED ETIOLOGY: Primary | ICD-10-CM

## 2020-01-23 LAB
DEPRECATED S PYO AG THROAT QL EIA: NORMAL
SPECIMEN SOURCE: NORMAL

## 2020-01-23 PROCEDURE — 87880 STREP A ASSAY W/OPTIC: CPT | Performed by: NURSE PRACTITIONER

## 2020-01-23 PROCEDURE — 99213 OFFICE O/P EST LOW 20 MIN: CPT | Performed by: NURSE PRACTITIONER

## 2020-01-23 PROCEDURE — 87081 CULTURE SCREEN ONLY: CPT | Performed by: NURSE PRACTITIONER

## 2020-01-23 ASSESSMENT — MIFFLIN-ST. JEOR: SCORE: 1793.92

## 2020-01-23 ASSESSMENT — ENCOUNTER SYMPTOMS
SORE THROAT: 1
ABDOMINAL PAIN: 0
VOICE CHANGE: 0
SINUS PAIN: 0
TROUBLE SWALLOWING: 0
SINUS PRESSURE: 0
FEVER: 0
SHORTNESS OF BREATH: 0
HEADACHES: 1
COUGH: 0
CHILLS: 1
NAUSEA: 0
FATIGUE: 1
VOMITING: 0

## 2020-01-23 NOTE — PROGRESS NOTES
Subjective     Ignacio Poole is a 44 year old male who presents to clinic today for the following health issues:    HPI   THROAT SYMPTOMS      Duration: 2 days    Description  sore throat, ear pain right, headache, fatigue/malaise, hoarse voice and myalgias    Severity: moderate    Accompanying signs and symptoms: None    History (predisposing factors):  strep exposure    Precipitating or alleviating factors: sleep, hot tea    Therapies tried and outcome:  None    Reports family members have had strep throat.  Denies sinus congestion or coughing.  Has not checked for fever at home.  Increased fatigue.  Denies n/v/d.        Patient Active Problem List   Diagnosis     CARDIOVASCULAR SCREENING; LDL GOAL LESS THAN 160     Insomnia     Eczema     IGT (impaired glucose tolerance)     High triglycerides     HDL deficiency     10 year risk of MI or stroke < 7.5%, 1.1% in April 2017     Other migraine without status migrainosus, not intractable     Past Surgical History:   Procedure Laterality Date     BIOPSY  youth    lymph node neck     BIOPSY  8/27/85    lymph node neck       Social History     Tobacco Use     Smoking status: Never Smoker     Smokeless tobacco: Never Used   Substance Use Topics     Alcohol use: No     Family History   Problem Relation Age of Onset     Gastrointestinal Disease Father         crohns     Asthma Father      Genetic Disorder Father         crohns disease     Prostate Cancer Paternal Grandfather      Diabetes Paternal Grandfather      Gastrointestinal Disease Sister         crohns     Asthma Sister      Genetic Disorder Sister         crohns disease         Current Outpatient Medications   Medication Sig Dispense Refill     butalbital-acetaminophen-caffeine (FIORICET/ESGIC) -40 MG tablet Take 1 tablet by mouth every 4 hours as needed 30 tablet 1     PARoxetine (PAXIL) 20 MG tablet Take 1 tablet (20 mg) by mouth daily 90 tablet 3     traZODone (DESYREL) 50 MG tablet Take 1 tablet (50  "mg) by mouth nightly as needed for sleep 90 tablet 1     No Known Allergies    Reviewed and updated as needed this visit by Provider  Tobacco  Allergies  Meds  Problems  Med Hx  Surg Hx  Fam Hx         Review of Systems   Constitutional: Positive for chills and fatigue. Negative for fever.   HENT: Positive for ear pain and sore throat. Negative for congestion, sinus pressure, sinus pain, trouble swallowing and voice change.    Respiratory: Negative for cough and shortness of breath.    Cardiovascular: Negative for chest pain.   Gastrointestinal: Negative for abdominal pain, nausea and vomiting.   Neurological: Positive for headaches.            Objective    /88   Pulse 69   Temp 100  F (37.8  C) (Oral)   Ht 1.753 m (5' 9\")   Wt 91.4 kg (201 lb 6.4 oz)   BMI 29.74 kg/m    Body mass index is 29.74 kg/m .  Physical Exam  Vitals signs reviewed.   Constitutional:       Appearance: He is well-developed.   HENT:      Head: Normocephalic.      Right Ear: A middle ear effusion is present.      Left Ear: A middle ear effusion is present.      Nose: Nose normal.      Mouth/Throat:      Lips: Pink.      Mouth: Mucous membranes are moist.      Pharynx: Oropharynx is clear. Posterior oropharyngeal erythema present.   Eyes:      General: Lids are normal.      Conjunctiva/sclera: Conjunctivae normal.   Neck:      Musculoskeletal: Normal range of motion and neck supple.   Cardiovascular:      Rate and Rhythm: Normal rate and regular rhythm.   Pulmonary:      Effort: Pulmonary effort is normal.      Breath sounds: Normal breath sounds.   Lymphadenopathy:      Cervical: No cervical adenopathy.   Neurological:      Mental Status: He is alert and oriented to person, place, and time.          Diagnostic Test Results:  Labs reviewed in Epic  Strep screen - Negative        Assessment & Plan     1. Pharyngitis, unspecified etiology  - push fluids, Tylenol/Ibuprofen prn pain/fever.  Offered Influenza testing and he declines " today.  Will await culture results.   - Strep, Rapid Screen  - Beta strep group A culture       Return in about 1 week (around 1/30/2020) for worsening symptoms or failure to improve.    Ruchi Bunn NP  [facility]

## 2020-01-23 NOTE — PATIENT INSTRUCTIONS
Patient Education     Viral Pharyngitis (Sore Throat)    You or your child have pharyngitis (sore throat). This infection is caused by a virus. It can cause throat pain that is worse when swallowing, aching all over, headache, and fever. The infection may be spread by coughing, kissing, or touching others after touching your mouth or nose. Antibiotic medicines do not work against viruses. They are not used for treating this illness.  Home care    If symptoms are severe, you or your child should rest at home. Return to work or school when you or your child feel well enough.     You or your child should drink plenty of fluids to prevent dehydration.    Use throat lozenges or numbing throat sprays to help reduce pain. Gargling with warm salt water will also help reduce throat pain. Dissolve 1/2 teaspoon of salt in 1 glass of warm water. Children can sip on juice or a popsicle. Children 5 years and older can also suck on a lollipop or hard candy.    Don t eat salty or spicy foods or give them to your child. These can be irritating to the throat.  Medicines for a child: You can give your child acetaminophen for fever, fussiness, or discomfort. In babies over 6 months of age, you may use ibuprofen instead of acetaminophen. If your child has chronic liver or kidney disease or ever had a stomach ulcer or GI bleeding, talk with your child s healthcare provider before giving these medicines. Aspirin should never be used by any child under 18 years of age who has a fever. It may cause severe liver damage.  Medicines for an adult: You may use acetaminophen or ibuprofen to control pain or fever, unless another medicine was prescribed for this. If you have chronic liver or kidney disease or ever had a stomach ulcer or GI bleeding, talk with your healthcare provider before using these medicines.  Follow-up care  Follow up with a healthcare provider or our staff if you or your child are not getting better over the next  week.  When to seek medical advice  Call your healthcare provider right away if any of these occur:    Fever as directed by your healthcare provider.  For children, seek care if:  ? Your child is of any age and has repeated fevers above 104 F (40 C).  ? Your child is younger than 2 years of age and has a fever of 100.4 F (38 C) for more than 1 day.  ? Your child is 2 years old or older and has a fever of 100.4 F (38 C) for more than 3 days.    New or worsening ear pain, sinus pain, or headache    Painful lumps in the back of neck    Stiff neck    Lymph nodes are getting larger    Can t swallow liquids, a lot of drooling, or can t open mouth wide due to throat pain    Signs of dehydration, such as very dark urine or no urine, sunken eyes, dizziness    Trouble breathing or noisy breathing    Muffled voice    New rash    Other symptoms are getting worse  Date Last Reviewed: 10/1/2017    8344-2923 [employer]. [address] [place] [address], [place] [address]. All rights reserved. This information is not intended as a substitute for professional medical care. Always follow your healthcare professional's instructions.

## 2020-01-24 LAB
BACTERIA SPEC CULT: NORMAL
SPECIMEN SOURCE: NORMAL

## 2020-03-02 DIAGNOSIS — G43.809 OTHER MIGRAINE WITHOUT STATUS MIGRAINOSUS, NOT INTRACTABLE: ICD-10-CM

## 2020-03-03 RX ORDER — BUTALBITAL, ACETAMINOPHEN AND CAFFEINE 50; 325; 40 MG/1; MG/1; MG/1
TABLET ORAL
Qty: 30 TABLET | Refills: 1 | Status: SHIPPED | OUTPATIENT
Start: 2020-03-03 | End: 2020-09-21

## 2020-03-03 NOTE — TELEPHONE ENCOUNTER
Controlled Substance Refill Request for [employer]  Problem List Complete:  No     PROVIDER TO CONSIDER COMPLETION OF PROBLEM LIST AND OVERVIEW/CONTROLLED SUBSTANCE AGREEMENT    Last Written Prescription Date:  4/8/19  Last Fill Quantity: 30,   # refills: 1    THE MOST RECENT OFFICE VISIT MUST BE WITHIN THE PAST 3 MONTHS. AT LEAST ONE FACE TO FACE VISIT MUST OCCUR EVERY 6 MONTHS. ADDITIONAL VISITS CAN BE VIRTUAL.  (THIS STATEMENT SHOULD BE DELETED.)    Last Office Visit with [facility] primary care provider: 5/28/19    Future Office visit: none    Controlled substance agreement:   Encounter-Level CSA:    There are no encounter-level csa.     Patient-Level CSA:    There are no patient-level csa.         Last Urine Drug Screen: No results found for: CDAUT, No results found for: COMDAT, No results found for: THC13, PCP13, COC13, MAMP13, OPI13, AMP13, BZO13, TCA13, MTD13, BAR13, OXY13, PPX13, BUP13    [email or web address]     checked in past 3 months?  Yes 3/3/2020 no concerns     Julisa NAIRN, RN

## 2020-06-03 DIAGNOSIS — F41.1 GAD (GENERALIZED ANXIETY DISORDER): ICD-10-CM

## 2020-06-04 RX ORDER — PAROXETINE 20 MG/1
TABLET, FILM COATED ORAL
Qty: 90 TABLET | Refills: 0 | Status: SHIPPED | OUTPATIENT
Start: 2020-06-04 | End: 2020-09-28

## 2020-06-04 NOTE — TELEPHONE ENCOUNTER
Routing refill request to provider for review/approval because:  Drug interaction warning      Julisa Oliver BSN, RN

## 2020-07-28 ENCOUNTER — VIRTUAL VISIT (OUTPATIENT)
Dept: FAMILY MEDICINE | Facility: OTHER | Age: 45
End: 2020-07-28

## 2020-07-28 NOTE — PROGRESS NOTES
"Date: 2020 18:34:19  Clinician: Suly Villaseñor  Clinician NPI: [id number]  Patient: Ignacio Poole  Patient : 1975  Patient Address: [address] [place] [address]  Patient Phone: [phone]  Visit Protocol: URI  Patient Summary:  Ignacio is a 44 year old ( : 1975 ) male who initiated a Visit for COVID-19 (Coronavirus) evaluation and screening. When asked the question \"Please sign me up to receive news, health information and promotions from [employer].\", Ignacio responded \"No\".    Ignacio states his symptoms started today.   His symptoms consist of nausea, tooth pain, diarrhea, myalgia, facial pain or pressure, vomiting, malaise, a headache, and chills. Ignacio also feels feverish.   Symptom details     Temperature: His current temperature is 100.1 degrees Fahrenheit. Ignacio has had a temperature over 100 degrees Fahrenheit for 1-2 days.     Facial pain or pressure: The facial pain or pressure feels worse when bending over or leaning forward.     Headache: He states the headache is severe (7-9 on a 10 point pain scale).     Tooth pain: The tooth pain is caused by a cavity, recent dental work, or other mouth problems.      Ignacio denies having wheezing, ageusia, sore throat, anosmia, cough, nasal congestion, rhinitis, and ear pain. He also denies having recent facial or sinus surgery in the past 60 days, taking antibiotic medication in the past month, and having a sinus infection within the past year. He is not experiencing dyspnea.   Precipitating events  He has not recently been exposed to someone with influenza. Ignacio has not been in close contact with any high risk individuals.   Pertinent COVID-19 (Coronavirus) information  In the past 14 days, Ignacio has not worked in a congregate living setting.   He does not work or volunteer as healthcare worker or a  and does not work or volunteer in a healthcare facility.   Ignacio also has not lived in a congregate living " setting in the past 14 days. He does not live with a healthcare worker.   Ignacio has not had a close contact with a laboratory-confirmed COVID-19 patient within 14 days of symptom onset.   Pertinent medical history  Ignacio does not need a return to work/school note.   Weight: 200 lbs   Ignacio does not smoke or use smokeless tobacco.   Additional information as reported by the patient (free text): I have an appointment for getting my eye tooth fixed it popped up filling out and it's really really bad. Just today I started feeling hot and the temperature showed up. I have been super super tired for the last couple of days, and have spent all day yesterday last night and today in bed. I can't tell if it's theTooth pain that's draining me and causing me to be tired or if there's something else, but the achiness and the over 100 temperature is what I want to get figured out   Weight: 200 lbs    MEDICATIONS: trazodone oral, paroxetine oral, Fioricet oral, ALLERGIES: NKDA  Clinician Response:  Dear Ignacio,   Your symptoms show that you may have coronavirus (COVID-19). This illness can cause fever, cough and trouble breathing. Many people get a mild case and get better on their own. Some people can get very sick.  What should I do?  We would like to test you for this virus.   1. Please call [phone] to schedule your visit. Explain that you were referred by [facility] to have a COVID-19 test. Be ready to share [facility] number.  The following will serve as your written order for this COVID Test, ordered by me, for the indication of suspected COVID [Z20.828]: The test will be ordered in [employer], our electronic health record, after you are scheduled. It will show as ordered and authorized by David Carr MD.  Order: COVID-19 (Coronavirus) PCR for SYMPTOMATIC testing from [facility].      2. When it's time for your COVID test:  Stay at least 6 feet away from others. (If someone will drive you to your test, stay in the  "backseat, as far away from the  as you can.)   Cover your mouth and nose with a mask, tissue or washcloth.  Go straight to the testing site. Don't make any stops on the way there or back.      3.Starting now: Stay home and away from others (self-isolate) until:   You've had no fever---and no medicine that reduces fever---for 3 full days (72 hours). And...   Your other symptoms have gotten better. For example, your cough or breathing has improved. And...   At least 10 days have passed since your symptoms started.       During this time, don't leave the house except for testing or medical care.   Stay in your own room, even for meals. Use your own bathroom if you can.   Stay away from others in your home. No hugging, kissing or shaking hands. No visitors.  Don't go to work, school or anywhere else.    Clean \"high touch\" surfaces often (doorknobs, counters, handles, etc.). Use a household cleaning spray or wipes. You'll find a full list of  on the EPA website: www.epa.gov/pesticide-registration/list-n-disinfectants-use-against-sars-cov-2.   Cover your mouth and nose with a mask, tissue or washcloth to avoid spreading germs.  Wash your hands and face often. Use soap and water.  Caregivers in these groups are at risk for severe illness due to COVID-19:  o People 65 years and older  o People who live in a nursing home or long-term care facility  o People with chronic disease (lung, heart, cancer, diabetes, kidney, liver, immunologic)  o People who have a weakened immune system, including those who:   Are in cancer treatment  Take medicine that weakens the immune system, such as corticosteroids  Had a bone marrow or organ transplant  Have an immune deficiency  Have poorly controlled HIV or AIDS  Are obese (body mass index of 40 or higher)  Smoke regularly   o Caregivers should wear gloves while washing dishes, handling laundry and cleaning bedrooms and bathrooms.  o Use caution when washing and drying " laundry: Don't shake dirty laundry, and use the warmest water setting that you can.  o For more tips, go to www.cdc.gov/coronavirus/2019-ncov/downloads/10Things.pdf.    4.Sign up for [employer]. We know it's scary to hear that you might have COVID-19. We want to track your symptoms to make sure you're okay over the next 2 weeks. Please look for an email from [employer]---this is a free, online program that we'll use to keep in touch. To sign up, follow the link in the email. Learn more at [email or web address]  How can I take care of myself?   Get lots of rest. Drink extra fluids (unless a doctor has told you not to).   Take Tylenol (acetaminophen) for fever or pain. If you have liver or kidney problems, ask your family doctor if it's okay to take Tylenol.   Adults can take either:    650 mg (two 325 mg pills) every 4 to 6 hours, or...   1,000 mg (two 500 mg pills) every 8 hours as needed.    Note: Don't take more than 3,000 mg in one day. Acetaminophen is found in many medicines (both prescribed and over-the-counter medicines). Read all labels to be sure you don't take too much.   For children, check the Tylenol bottle for the right dose. The dose is based on the child's age or weight.    If you have other health problems (like cancer, heart failure, an organ transplant or severe kidney disease): Call your specialty clinic if you don't feel better in the next 2 days.       Know when to call 911. Emergency warning signs include:    Trouble breathing or shortness of breath Pain or pressure in the chest that doesn't go away Feeling confused like you haven't felt before, or not being able to wake up Bluish-colored lips or face.  Where can I get more information?    [facility] -- About COVID-19: [email or web address]   CDC -- What to Do If You're Sick: www.cdc.gov/coronavirus/2019-ncov/about/steps-when-sick.html   CDC -- Ending Home Isolation:  www.cdc.gov/coronavirus/2019-ncov/hcp/disposition-in-home-patients.html   [facility] -- Caring for Someone: www.cdc.gov/coronavirus/2019-ncov/if-you-are-sick/care-for-someone.html   [facility] -- Interim Guidance for Hospital Discharge to Home: www.[facility].[place].us/diseases/coronavirus/hcp/hospdischarge.pdf   [facility] clinical trials (COVID-19 research studies): clinicalaffairs.[facility]-clinical-trials    Below are the COVID-19 hotlines at the [place] Department of Health ([facility]). Interpreters are available.    For health questions: Call 655-359-8086 or 1-376.902.4748 (7 a.m. to 7 p.m.) For questions about schools and childcare: Call 530-258-3471 or 1-255.921.6789 (7 a.m. to 7 p.m.)    Diagnosis: Fever, unspecified  Diagnosis ICD: R50.9

## 2020-08-07 DIAGNOSIS — Z13.6 CARDIOVASCULAR SCREENING; LDL GOAL LESS THAN 160: ICD-10-CM

## 2020-08-07 DIAGNOSIS — E78.6 HDL DEFICIENCY: ICD-10-CM

## 2020-08-07 DIAGNOSIS — Z11.4 SCREENING FOR HIV (HUMAN IMMUNODEFICIENCY VIRUS): ICD-10-CM

## 2020-08-07 DIAGNOSIS — R73.02 IGT (IMPAIRED GLUCOSE TOLERANCE): ICD-10-CM

## 2020-08-07 DIAGNOSIS — E78.1 HIGH TRIGLYCERIDES: ICD-10-CM

## 2020-08-07 LAB
ALBUMIN SERPL-MCNC: 4 G/DL (ref 3.4–5)
ALP SERPL-CCNC: 81 U/L (ref 40–150)
ALT SERPL W P-5'-P-CCNC: 53 U/L (ref 0–70)
ANION GAP SERPL CALCULATED.3IONS-SCNC: 2 MMOL/L (ref 3–14)
AST SERPL W P-5'-P-CCNC: 21 U/L (ref 0–45)
BILIRUB SERPL-MCNC: 0.8 MG/DL (ref 0.2–1.3)
BUN SERPL-MCNC: 13 MG/DL (ref 7–30)
CALCIUM SERPL-MCNC: 9 MG/DL (ref 8.5–10.1)
CHLORIDE SERPL-SCNC: 108 MMOL/L (ref 94–109)
CHOLEST SERPL-MCNC: 195 MG/DL
CO2 SERPL-SCNC: 31 MMOL/L (ref 20–32)
CREAT SERPL-MCNC: 1.21 MG/DL (ref 0.66–1.25)
GFR SERPL CREATININE-BSD FRML MDRD: 72 ML/MIN/{1.73_M2}
GLUCOSE SERPL-MCNC: 104 MG/DL (ref 70–99)
HDLC SERPL-MCNC: 33 MG/DL
LDLC SERPL CALC-MCNC: 111 MG/DL
NONHDLC SERPL-MCNC: 162 MG/DL
POTASSIUM SERPL-SCNC: 4.7 MMOL/L (ref 3.4–5.3)
PROT SERPL-MCNC: 7.3 G/DL (ref 6.8–8.8)
SODIUM SERPL-SCNC: 141 MMOL/L (ref 133–144)
TRIGL SERPL-MCNC: 256 MG/DL

## 2020-08-07 PROCEDURE — 87389 HIV-1 AG W/HIV-1&-2 AB AG IA: CPT | Performed by: FAMILY MEDICINE

## 2020-08-07 PROCEDURE — 80061 LIPID PANEL: CPT | Performed by: FAMILY MEDICINE

## 2020-08-07 PROCEDURE — 36415 COLL VENOUS BLD VENIPUNCTURE: CPT | Performed by: FAMILY MEDICINE

## 2020-08-07 PROCEDURE — 80053 COMPREHEN METABOLIC PANEL: CPT | Performed by: FAMILY MEDICINE

## 2020-08-09 LAB — HIV 1+2 AB+HIV1 P24 AG SERPL QL IA: NONREACTIVE

## 2020-09-19 DIAGNOSIS — G43.809 OTHER MIGRAINE WITHOUT STATUS MIGRAINOSUS, NOT INTRACTABLE: ICD-10-CM

## 2020-09-21 RX ORDER — BUTALBITAL, ACETAMINOPHEN AND CAFFEINE 50; 325; 40 MG/1; MG/1; MG/1
TABLET ORAL
Qty: 30 TABLET | Refills: 0 | Status: SHIPPED | OUTPATIENT
Start: 2020-09-21 | End: 2020-12-14

## 2020-09-21 NOTE — TELEPHONE ENCOUNTER
Controlled Substance Refill Request for [facility]  Problem List Complete:  No      PROVIDER TO CONSIDER COMPLETION OF PROBLEM LIST AND OVERVIEW/CONTROLLED SUBSTANCE AGREEMENT     Last Written Prescription Date:  3/3/20  Last Fill Quantity: 30,   # refills: 1     THE MOST RECENT OFFICE VISIT MUST BE WITHIN THE PAST 3 MONTHS. AT LEAST ONE FACE TO FACE VISIT MUST OCCUR EVERY 6 MONTHS. ADDITIONAL VISITS CAN BE VIRTUAL.  (THIS STATEMENT SHOULD BE DELETED.)     Last Office Visit with [facility] primary care provider: 5/28/19     Future Office visit: none     Controlled substance agreement:   Encounter-Level CSA:    There are no encounter-level csa.      Patient-Level CSA:    There are no patient-level csa.            Last Urine Drug Screen: No results found for: CDAUT, No results found for: COMDAT, No results found for: THC13, PCP13, COC13, MAMP13, OPI13, AMP13, BZO13, TCA13, MTD13, BAR13, OXY13, PPX13, BUP13     [email or web address]      checked in past 3 months?  Yes 9/21/20, printed and given to Dr Monzon.  Lesly Mixon, RN

## 2020-09-28 DIAGNOSIS — F41.1 GAD (GENERALIZED ANXIETY DISORDER): ICD-10-CM

## 2020-09-28 RX ORDER — PAROXETINE 20 MG/1
TABLET, FILM COATED ORAL
Qty: 90 TABLET | Refills: 0 | Status: SHIPPED | OUTPATIENT
Start: 2020-09-28 | End: 2021-02-10

## 2020-12-14 DIAGNOSIS — G43.809 OTHER MIGRAINE WITHOUT STATUS MIGRAINOSUS, NOT INTRACTABLE: ICD-10-CM

## 2020-12-16 ENCOUNTER — MYC REFILL (OUTPATIENT)
Dept: FAMILY MEDICINE | Facility: CLINIC | Age: 45
End: 2020-12-16

## 2020-12-16 DIAGNOSIS — G43.809 OTHER MIGRAINE WITHOUT STATUS MIGRAINOSUS, NOT INTRACTABLE: ICD-10-CM

## 2020-12-16 DIAGNOSIS — F51.04 PSYCHOPHYSIOLOGICAL INSOMNIA: ICD-10-CM

## 2020-12-16 RX ORDER — BUTALBITAL, ACETAMINOPHEN AND CAFFEINE 50; 325; 40 MG/1; MG/1; MG/1
TABLET ORAL
Qty: 30 TABLET | Refills: 0 | Status: CANCELLED | OUTPATIENT
Start: 2020-12-16

## 2020-12-16 RX ORDER — BUTALBITAL, ACETAMINOPHEN AND CAFFEINE 50; 325; 40 MG/1; MG/1; MG/1
TABLET ORAL
Qty: 30 TABLET | Refills: 0 | Status: SHIPPED | OUTPATIENT
Start: 2020-12-16 | End: 2021-05-19

## 2020-12-16 NOTE — TELEPHONE ENCOUNTER
Controlled Substance Refill Request for [facility]  Problem List Complete:  No      PROVIDER TO CONSIDER COMPLETION OF PROBLEM LIST AND OVERVIEW/CONTROLLED SUBSTANCE AGREEMENT     Last Written Prescription Date:  9/21/20  Last Fill Quantity: 30,   # refills: 0     THE MOST RECENT OFFICE VISIT MUST BE WITHIN THE PAST 3 MONTHS. AT LEAST ONE FACE TO FACE VISIT MUST OCCUR EVERY 6 MONTHS. ADDITIONAL VISITS CAN BE VIRTUAL.  (THIS STATEMENT SHOULD BE DELETED.)     Last Office Visit with [facility] primary care provider: 5/28/19     Future Office visit: none     Controlled substance agreement:   Encounter-Level CSA:    There are no encounter-level csa.      Patient-Level CSA:    There are no patient-level csa.       Last Urine Drug Screen: No results found for: CDAUT, No results found for: COMDAT, No results found for: THC13, PCP13, COC13, MAMP13, OPI13, AMP13, BZO13, TCA13, MTD13, BAR13, OXY13, PPX13, BUP13     [email or web address]      checked in past 3 months?  Yes 9/21/20    Julisa NAIRN, RN

## 2020-12-17 RX ORDER — TRAZODONE HYDROCHLORIDE 50 MG/1
50 TABLET, FILM COATED ORAL
Qty: 90 TABLET | Refills: 1 | Status: SHIPPED | OUTPATIENT
Start: 2020-12-17 | End: 2023-03-01

## 2021-01-10 ENCOUNTER — HEALTH MAINTENANCE LETTER (OUTPATIENT)
Age: 46
End: 2021-01-10

## 2021-02-10 DIAGNOSIS — F41.1 GAD (GENERALIZED ANXIETY DISORDER): ICD-10-CM

## 2021-02-10 RX ORDER — PAROXETINE 20 MG/1
20 TABLET, FILM COATED ORAL DAILY
Qty: 90 TABLET | Refills: 0 | Status: SHIPPED | OUTPATIENT
Start: 2021-02-10 | End: 2021-05-19

## 2021-02-10 NOTE — TELEPHONE ENCOUNTER
Routing refill request to provider for review/approval because:  Patient needs to be seen because it has been more than 1 year since last office visit.  No appointment pending at this time.  Routing to provider to advise.    Julisa Oliver BSN, RN

## 2021-06-07 ENCOUNTER — OFFICE VISIT (OUTPATIENT)
Dept: FAMILY MEDICINE | Facility: CLINIC | Age: 46
End: 2021-06-07
Payer: COMMERCIAL

## 2021-06-07 VITALS
TEMPERATURE: 97.9 F | WEIGHT: 203 LBS | SYSTOLIC BLOOD PRESSURE: 128 MMHG | BODY MASS INDEX: 29.06 KG/M2 | HEART RATE: 87 BPM | HEIGHT: 70 IN | DIASTOLIC BLOOD PRESSURE: 83 MMHG | OXYGEN SATURATION: 96 %

## 2021-06-07 DIAGNOSIS — F41.1 GAD (GENERALIZED ANXIETY DISORDER): ICD-10-CM

## 2021-06-07 DIAGNOSIS — F51.04 PSYCHOPHYSIOLOGICAL INSOMNIA: ICD-10-CM

## 2021-06-07 DIAGNOSIS — L02.92 BOIL: ICD-10-CM

## 2021-06-07 DIAGNOSIS — G43.809 OTHER MIGRAINE WITHOUT STATUS MIGRAINOSUS, NOT INTRACTABLE: ICD-10-CM

## 2021-06-07 DIAGNOSIS — Z12.5 SCREENING PSA (PROSTATE SPECIFIC ANTIGEN): ICD-10-CM

## 2021-06-07 DIAGNOSIS — Z00.00 ROUTINE GENERAL MEDICAL EXAMINATION AT A HEALTH CARE FACILITY: Primary | ICD-10-CM

## 2021-06-07 DIAGNOSIS — R53.83 OTHER FATIGUE: ICD-10-CM

## 2021-06-07 LAB
ERYTHROCYTE [DISTWIDTH] IN BLOOD BY AUTOMATED COUNT: 13.7 % (ref 10–15)
HCT VFR BLD AUTO: 44.1 % (ref 40–53)
HGB BLD-MCNC: 15.5 G/DL (ref 13.3–17.7)
MCH RBC QN AUTO: 29.1 PG (ref 26.5–33)
MCHC RBC AUTO-ENTMCNC: 35.1 G/DL (ref 31.5–36.5)
MCV RBC AUTO: 83 FL (ref 78–100)
PLATELET # BLD AUTO: 240 10E9/L (ref 150–450)
PSA SERPL-ACNC: 0.53 UG/L (ref 0–4)
RBC # BLD AUTO: 5.32 10E12/L (ref 4.4–5.9)
TSH SERPL DL<=0.005 MIU/L-ACNC: 1.48 MU/L (ref 0.4–4)
WBC # BLD AUTO: 6.2 10E9/L (ref 4–11)

## 2021-06-07 PROCEDURE — 99214 OFFICE O/P EST MOD 30 MIN: CPT | Mod: 25 | Performed by: FAMILY MEDICINE

## 2021-06-07 PROCEDURE — 36415 COLL VENOUS BLD VENIPUNCTURE: CPT | Performed by: FAMILY MEDICINE

## 2021-06-07 PROCEDURE — G0103 PSA SCREENING: HCPCS | Performed by: FAMILY MEDICINE

## 2021-06-07 PROCEDURE — 84443 ASSAY THYROID STIM HORMONE: CPT | Performed by: FAMILY MEDICINE

## 2021-06-07 PROCEDURE — 85027 COMPLETE CBC AUTOMATED: CPT | Performed by: FAMILY MEDICINE

## 2021-06-07 PROCEDURE — 99396 PREV VISIT EST AGE 40-64: CPT | Performed by: FAMILY MEDICINE

## 2021-06-07 RX ORDER — DOXYCYCLINE HYCLATE 100 MG
100 TABLET ORAL 2 TIMES DAILY
Qty: 14 TABLET | Refills: 0 | Status: SHIPPED | OUTPATIENT
Start: 2021-06-07 | End: 2021-06-14

## 2021-06-07 ASSESSMENT — ENCOUNTER SYMPTOMS
DYSURIA: 0
COUGH: 0
NAUSEA: 0
HEADACHES: 0
ARTHRALGIAS: 0
FREQUENCY: 0
ABDOMINAL PAIN: 0
FEVER: 0
HEMATURIA: 0
JOINT SWELLING: 0
HEMATOCHEZIA: 0
CONSTIPATION: 0
NERVOUS/ANXIOUS: 0
WEAKNESS: 0
EYE PAIN: 0
SHORTNESS OF BREATH: 0
DIARRHEA: 0
MYALGIAS: 0
HEARTBURN: 0
DIZZINESS: 0
CHILLS: 0
SORE THROAT: 0
PALPITATIONS: 0
PARESTHESIAS: 1

## 2021-06-07 ASSESSMENT — MIFFLIN-ST. JEOR: SCORE: 1812.05

## 2021-06-07 NOTE — PROGRESS NOTES
SUBJECTIVE:   CC: Ignacio Poole is an 45 year old male who presents for preventative health visit.     Had las last summer borderline lipids/trig and blood sugars. Normal cmp.   History migraines, insomnia and anxiety.   Low risk camp - no scuba diving or rock climbing.   4 kids - one girl/3 boys. Marriage ok and kids ok.   Dad  liver failure - crohns and medication -  65yo. No family history mi/cva. Mom breast cancer at 74yo. No early colon cancer. Dad with squamous cancer.   Drinks a lot of milk. Needs more exercise. Insomnia - trazodone helpful taking 1/2 pill.   No major snoring or brad.   Emotionally doing ok.    Lack of motivation. Caffeine 2 cups/day. No pop.   No SUICIAL IDEATION OR HOMOCIDAL IDEATION OR EFREN.  Chest pain or shortness of breath. No nausea, vomiting or diarrhea or black/blooody stools. No  Urine changes or hematuria. No family history thyroid issues.   No fevers or chills or hot/cold. No rash/mole changes.   right leg/bottock boils.   Cooler showers but likes soap. No history MRSA. No hot tubs. No baths.  No fevers or chills. No testicle pain/masses/hernai. S/p vasc.   Patient has been advised of split billing requirements and indicates understanding: Yes  Healthy Habits:     Getting at least 3 servings of Calcium per day:  NO    Bi-annual eye exam:  Yes    Dental care twice a year:  Yes    Sleep apnea or symptoms of sleep apnea:  Daytime drowsiness    Diet:  Carbohydrate counting    Frequency of exercise:  None    Taking medications regularly:  Yes    Medication side effects:  None    PHQ-2 Total Score: 2    Additional concerns today:  Yes        Skin lesion on right leg, blood work and fatigue     Today's PHQ-2 Score:   PHQ-2 (  [employer]) 2021   Q1: Little interest or pleasure in doing things 1   Q2: Feeling down, depressed or hopeless 1   PHQ-2 Score 2   Q1: Little interest or pleasure in doing things Several days   Q2: Feeling down, depressed or hopeless Several days  "  PHQ-2 Score 2       Abuse: Current or Past(Physical, Sexual or Emotional)- No  Do you feel safe in your environment? Yes      Social History     Tobacco Use     Smoking status: Never Smoker     Smokeless tobacco: Never Used   Substance Use Topics     Alcohol use: No     If you drink alcohol do you typically have >3 drinks per day or >7 drinks per week? No    Alcohol Use 6/7/2021   Prescreen: >3 drinks/day or >7 drinks/week? No   Prescreen: >3 drinks/day or >7 drinks/week? -       Last PSA: No results found for: PSA    Reviewed orders with patient. Reviewed health maintenance and updated orders accordingly - Yes  Lab work is in process    Reviewed and updated as needed this visit by clinical staff   Allergies  Meds              Reviewed and updated as needed this visit by Provider                    Review of Systems   Constitutional: Negative for chills and fever.   HENT: Negative for congestion, ear pain, hearing loss and sore throat.    Eyes: Negative for pain and visual disturbance.   Respiratory: Negative for cough and shortness of breath.    Cardiovascular: Negative for chest pain, palpitations and peripheral edema.   Gastrointestinal: Negative for abdominal pain, constipation, diarrhea, heartburn, hematochezia and nausea.   Genitourinary: Negative for discharge, dysuria, frequency, genital sores, hematuria, impotence and urgency.   Musculoskeletal: Negative for arthralgias, joint swelling and myalgias.   Skin: Negative for rash.   Neurological: Positive for paresthesias. Negative for dizziness, weakness and headaches.   Psychiatric/Behavioral: Negative for mood changes. The patient is not nervous/anxious.      All other ROS negative    OBJECTIVE:   There were no vitals taken for this visit.  /83   Pulse 87   Temp 97.9  F (36.6  C) (Tympanic)   Ht 1.778 m (5' 10\")   Wt 92.1 kg (203 lb)   SpO2 96%   BMI 29.13 kg/m     Physical Exam  GENERAL: healthy, alert and no distress  EYES: Eyes grossly " normal to inspection, PERRL and conjunctivae and sclerae normal  HENT: ear canals and TM's normal, nose and mouth without ulcers or lesions  NECK: no adenopathy, no asymmetry, masses, or scars and thyroid normal to palpation  RESP: lungs clear to auscultation - no rales, rhonchi or wheezes  BREAST: normal without masses, tenderness or nipple discharge and no palpable axillary masses or adenopathy  CV: regular rate and rhythm, normal S1 S2, no S3 or S4, no murmur, click or rub, no peripheral edema and peripheral pulses strong  ABDOMEN: soft, nontender, no hepatosplenomegaly, no masses and bowel sounds normal   (male): patient deferred exam  MS: no gross musculoskeletal defects noted, no edema  SKIN: no suspicious lesions or rashes  SKIN: small boils on bilateral legs   NEURO: Normal strength and tone, mentation intact and speech normal  PSYCH: mentation appears normal, affect normal/bright  LYMPH: no cervical, supraclavicular, axillary adenopathy    ASSESSMENT/PLAN:   ASSESSMENT / PLAN:  (Z00.00) Routine general medical examination at a health care facility  (primary encounter diagnosis)  Comment: generally healthy and normal exam.  Plan: CBC with platelets, TSH with free T4 reflex        Reviewed self mole/testicle check handout.  Continue milk for vitaminD.     (R53.83) Other fatigue  Comment: lack of exercise/psych related  Plan: TSH with free T4 reflex        Exercise and consider adding wellbutrin. Return to clinic if worse or new symptoms. Expected course and warning signs reviewed. Call/email with questions/concerns     (Z12.5) Screening PSA (prostate specific antigen  Plan: PSA, screen            (F41.1) CLAUDINE (generalized anxiety disorder)  Comment: stable  Plan: continue meds. Exercise and limit caffeine/ ALCOHOL. If SUICIAL IDEATION OR HOMOCIDAL IDEATION OR EFREN TO ER Recheck in 6 months      (F51.04) Psychophysiological insomnia  Comment: stable  Plan: prn trazodone. Exercise.    (G43.809) Other migraine  "without status migrainosus, not intractable  Comment: stable  Plan: prn med. Exercise.     (L02.92) Boil    Plan: doxycycline hyclate (VIBRA-TABS) 100 MG tablet        hibiscenses scrub given x2 days. Reveiwed risks and side effects of medication  Avoid picking area. Derm follow-up if persitst. Expected course and warning signs reviewed. Call/email with questions/concerns       Patient has been advised of split billing requirements and indicates understanding: Yes  COUNSELING:   Reviewed preventive health counseling, as reflected in patient instructions       Regular exercise       Healthy diet/nutrition       Vision screening       Alcohol Use        Prostate cancer screening       Osteoporosis prevention/bone health    Estimated body mass index is 29.74 kg/m  as calculated from the following:    Height as of 1/23/20: 1.753 m (5' 9\").    Weight as of 1/23/20: 91.4 kg (201 lb 6.4 oz).     Weight management plan: Discussed healthy diet and exercise guidelines    He reports that he has never smoked. He has never used smokeless tobacco.      Counseling Resources:  ATP IV Guidelines  Pooled Cohorts Equation Calculator  FRAX Risk Assessment  ICSI Preventive Guidelines  Dietary Guidelines for Americans, 2010  USDA's MyPlate  ASA Prophylaxis  Lung CA Screening    Dominick Leyva MD  [facility]  "

## 2021-06-07 NOTE — PROGRESS NOTES
"  3  SUBJECTIVE:   CC: Ignacio Poole is an 45 year old male who presents for preventive health visit.     {Split Bill scripting  The purpose of this visit is to discuss your medical history and prevent health problems before you are sick. You may be responsible for a co-pay, coinsurance, or deductible if your visit today includes services such as checking on a sore throat, having an x-ray or lab test, or treating and evaluating a new or existing condition :442149}  Patient has been advised of split billing requirements and indicates understanding: {Yes and No:395494}  Healthy Habits:    Do you get at least three servings of calcium containing foods daily (dairy, green leafy vegetables, etc.)? { :728100::\"yes\"}    Amount of exercise or daily activities, outside of work: { :326783}    Problems taking medications regularly { :046550::\"No\"}    Medication side effects: { :773921::\"No\"}    Have you had an eye exam in the past two years? { :444088}    Do you see a dentist twice per year? { :496411}    Do you have sleep apnea, excessive snoring or daytime drowsiness?{ :462958}  {Outside tests to abstract? :944938}    {additional problems to add (Optional):728891}    Today's PHQ-2 Score:   PHQ-2 ( 1999 [employer]) 5/28/2019 4/12/2018   Q1: Little interest or pleasure in doing things 1 1   Q2: Feeling down, depressed or hopeless 1 1   PHQ-2 Score 2 2   Q1: Little interest or pleasure in doing things Several days Several days   Q2: Feeling down, depressed or hopeless Several days Several days   PHQ-2 Score 2 2     {PHQ-2 LOOK IN ASSESSMENTS (Optional) :104846}  Abuse: Current or Past(Physical, Sexual or Emotional)- {YES/NO/NA:718561}  Do you feel safe in your environment? {YES/NO/NA:394032}    Have you ever done Advance Care Planning? (For example, a Health Directive, POLST, or a discussion with a medical provider or your loved ones about your wishes): { :066879}    Social History     Tobacco Use     Smoking status: Never " "Smoker     Smokeless tobacco: Never Used   Substance Use Topics     Alcohol use: No     If you drink alcohol do you typically have >3 drinks per day or >7 drinks per week? {ETOH :440139}                      Last PSA: No results found for: PSA    Reviewed orders with patient. Reviewed health maintenance and updated orders accordingly - {Yes/No:087234::\"Yes\"}  {Chronicprobdata (Optional):836726}    Reviewed and updated as needed this visit by clinical staff                 Reviewed and updated as needed this visit by Provider                {HISTORY OPTIONS (Optional):567850}    ROS:  { :213426::\"CONSTITUTIONAL: NEGATIVE for fever, chills, change in weight\",\"INTEGUMENTARY/SKIN: NEGATIVE for worrisome rashes, moles or lesions\",\"EYES: NEGATIVE for vision changes or irritation\",\"ENT: NEGATIVE for ear, mouth and throat problems\",\"RESP: NEGATIVE for significant cough or SOB\",\"CV: NEGATIVE for chest pain, palpitations or peripheral edema\",\"GI: NEGATIVE for nausea, abdominal pain, heartburn, or change in bowel habits\",\" male: negative for dysuria, hematuria, decreased urinary stream, erectile dysfunction, urethral discharge\",\"MUSCULOSKELETAL: NEGATIVE for significant arthralgias or myalgia\",\"NEURO: NEGATIVE for weakness, dizziness or paresthesias\",\"PSYCHIATRIC: NEGATIVE for changes in mood or affect\"}    OBJECTIVE:   There were no vitals taken for this visit.  EXAM:  {Exam Choices:494209}    {Diagnostic Test Results (Optional):068825::\"Diagnostic Test Results:\",\"Labs reviewed in Epic\"}    ASSESSMENT/PLAN:   {Diag Picklist:517871}    Patient has been advised of split billing requirements and indicates understanding: {YES / NO:073083::\"Yes\"}  COUNSELING:  {MALE COUNSELING MESSAGES:077695::\"Reviewed preventive health counseling, as reflected in patient instructions\"}    Estimated body mass index is 29.74 kg/m  as calculated from the following:    Height as of 1/23/20: 1.753 m (5' 9\").    Weight as of 1/23/20: 91.4 kg (201 lb " 6.4 oz).    {Weight Management Plan (ACO) Complete if BMI is abnormal-  Ages 18-64  BMI >24.9.  Age 65+ with BMI <23 or >30 (Optional):300599}    He reports that he has never smoked. He has never used smokeless tobacco.      Counseling Resources:  ATP IV Guidelines  Pooled Cohorts Equation Calculator  FRAX Risk Assessment  ICSI Preventive Guidelines  Dietary Guidelines for Americans, 2010  USDA's MyPlate  ASA Prophylaxis  Lung CA Screening    Dominick Leyva MD  [facility]

## 2021-08-10 ENCOUNTER — E-VISIT (OUTPATIENT)
Dept: URGENT CARE | Facility: URGENT CARE | Age: 46
End: 2021-08-10
Payer: COMMERCIAL

## 2021-08-10 DIAGNOSIS — B35.6 JOCK ITCH: Primary | ICD-10-CM

## 2021-08-10 PROCEDURE — 99421 OL DIG E/M SVC 5-10 MIN: CPT | Performed by: PHYSICIAN ASSISTANT

## 2021-08-11 ENCOUNTER — MYC MEDICAL ADVICE (OUTPATIENT)
Dept: FAMILY MEDICINE | Facility: CLINIC | Age: 46
End: 2021-08-11

## 2021-08-11 RX ORDER — PRENATAL VIT 91/IRON/FOLIC/DHA 28-975-200
COMBINATION PACKAGE (EA) ORAL 2 TIMES DAILY
Qty: 30 G | Refills: 1 | Status: SHIPPED | OUTPATIENT
Start: 2021-08-11 | End: 2021-09-13

## 2021-08-28 DIAGNOSIS — G43.809 OTHER MIGRAINE WITHOUT STATUS MIGRAINOSUS, NOT INTRACTABLE: ICD-10-CM

## 2021-08-30 RX ORDER — BUTALBITAL, ACETAMINOPHEN AND CAFFEINE 50; 325; 40 MG/1; MG/1; MG/1
TABLET ORAL
Qty: 30 TABLET | Refills: 0 | Status: SHIPPED | OUTPATIENT
Start: 2021-08-30 | End: 2021-10-19

## 2021-09-13 ENCOUNTER — VIRTUAL VISIT (OUTPATIENT)
Dept: FAMILY MEDICINE | Facility: CLINIC | Age: 46
End: 2021-09-13
Payer: COMMERCIAL

## 2021-09-13 DIAGNOSIS — F51.04 PSYCHOPHYSIOLOGICAL INSOMNIA: ICD-10-CM

## 2021-09-13 DIAGNOSIS — F41.1 GAD (GENERALIZED ANXIETY DISORDER): ICD-10-CM

## 2021-09-13 PROCEDURE — 99214 OFFICE O/P EST MOD 30 MIN: CPT | Mod: GT | Performed by: FAMILY MEDICINE

## 2021-09-13 PROCEDURE — 96127 BRIEF EMOTIONAL/BEHAV ASSMT: CPT | Mod: GT | Performed by: FAMILY MEDICINE

## 2021-09-13 RX ORDER — VENLAFAXINE HYDROCHLORIDE 37.5 MG/1
CAPSULE, EXTENDED RELEASE ORAL
Qty: 60 CAPSULE | Refills: 0 | Status: SHIPPED | OUTPATIENT
Start: 2021-09-13 | End: 2021-10-19

## 2021-09-13 ASSESSMENT — ANXIETY QUESTIONNAIRES
2. NOT BEING ABLE TO STOP OR CONTROL WORRYING: MORE THAN HALF THE DAYS
IF YOU CHECKED OFF ANY PROBLEMS ON THIS QUESTIONNAIRE, HOW DIFFICULT HAVE THESE PROBLEMS MADE IT FOR YOU TO DO YOUR WORK, TAKE CARE OF THINGS AT HOME, OR GET ALONG WITH OTHER PEOPLE: SOMEWHAT DIFFICULT
3. WORRYING TOO MUCH ABOUT DIFFERENT THINGS: MORE THAN HALF THE DAYS
GAD7 TOTAL SCORE: 11
1. FEELING NERVOUS, ANXIOUS, OR ON EDGE: MORE THAN HALF THE DAYS
7. FEELING AFRAID AS IF SOMETHING AWFUL MIGHT HAPPEN: SEVERAL DAYS
6. BECOMING EASILY ANNOYED OR IRRITABLE: SEVERAL DAYS
5. BEING SO RESTLESS THAT IT IS HARD TO SIT STILL: MORE THAN HALF THE DAYS

## 2021-09-13 ASSESSMENT — PATIENT HEALTH QUESTIONNAIRE - PHQ9
SUM OF ALL RESPONSES TO PHQ QUESTIONS 1-9: 15
5. POOR APPETITE OR OVEREATING: SEVERAL DAYS

## 2021-09-13 NOTE — PROGRESS NOTES
Ignacio is a 46 year old who is being evaluated via a billable video visit.      How would you like to obtain your AVS? MyChart  If the video visit is dropped, the invitation should be resent by: Text to cell phone: [phone]  Will anyone else be joining your video visit? No    Video Start Time: 12:02 PM    Assessment & Plan     Psychophysiological insomnia      CLAUDINE (generalized anxiety disorder)    - venlafaxine (EFFEXOR-XR) 37.5 MG 24 hr capsule; Take one tablet/capsule daily in the in the morning  for 7 days and then  two tablets/capsules daily in the morning  there after.             Depression Screening Follow Up    PHQ 9/13/2021   PHQ-9 Total Score 15   Q9: Thoughts of better off dead/self-harm past 2 weeks Not at all         Follow Up Actions Taken           No follow-ups on file.    Matthieu Monzon MD  [facility]    Subjective   Ignacio is a 46 year old who presents for the following health issues     HPI      Fatigue, on going          Review of Systems         Objective           Vitals:  No vitals were obtained today due to virtual visit.    Physical Exam   GENERAL: Healthy, alert and no distress  EYES: Eyes grossly normal to inspection.  No discharge or erythema, or obvious scleral/conjunctival abnormalities.  RESP: No audible wheeze, cough, or visible cyanosis.  No visible retractions or increased work of breathing.    SKIN: Visible skin clear. No significant rash, abnormal pigmentation or lesions.  NEURO: Cranial nerves grossly intact.  Mentation and speech appropriate for age.  PSYCH: Mentation appears normal, affect normal/bright, judgement and insight intact, normal speech and appearance well-groomed.                Video-Visit Details    Type of service:  Video Visit    Video End Time:12:12 PM    Originating Location (pt. Location): Home    Distant Location (provider location):  [facility]     Platform used for Video Visit: SlideJar  "    --------------------------------------------------------------------------------------------------------------------------------------  SUBJECTIVE:  Ignacio Poole is a 46 year old male who presents for a follow up evaluation of anxiety. The patient was started on Paxil (paroxetine) 20 mg daily  1-2 years ago. He reports that at first he felt different and much better regarding his anxiety.     He reports that he  has been struggling to get some energy recently  He has lost 8 pounds with exercise and  is sleeping better    He reports that as he is driving home from work he has a lot of ideas of things he wants to get accomplished when he gets home.  However when he gets home his energy goes away and he does not want to do much of anything.     He takes the paxil at night   He has not been on other medication for anxiety   He takes about 1/3 of a trazodone at bedtime and only on nights when he can sleep for 8 hours.        CLAUDINE-7    Over the last 2 weeks, how often have you been bothered by the following problems?  (Use an \"x\" to indicate your answer) Not at all                (0) Several days                (1) More than half the days        (2) Nearly every day          (3)   1. Feeling nervous, anxious or on edge       2. Not being able to stop or control worrying       3. Worrying too much about different things       4. Trouble relaxing       5. Being so restless that it is hard to sit still       6. Becoming easily annoyed or irritable       7. Feeling afraid as if something awful might happen         Total_______    Cut points for:   Mild Anxiety =  5  Moderate= 10  Severe=  15    CLAUDINE-7 SCORE 4/8/2019 5/28/2019 9/13/2021   Total Score 15 6 11       PHQ 4/8/2019 5/28/2019 9/13/2021   PHQ-9 Total Score 14 8 15   Q9: Thoughts of better off dead/self-harm past 2 weeks Not at all Not at all Not at all         OBJECTIVE:  General: the patient had a calm and pleasant affect during the visit today.    ASSESSMENT: " Generalized Anxiety Disorder (CLAUDINE) which has worsened as of late and he is likely having fatigue from his paxil.       PLAN:  We will discontinue the paxil after tonight's dose.     We will have the patient start on Effexor XR (venlafaxine XR) 37.5 mg daily for a week and then 75 mg continuously after that. I asked the patient to return to clinic for appointment in 4 weeks for reevaluation.    The patient was advised of the potential side effects of the medication and was asked to call if any of them persist past 7 days of starting it or starting on a new dose.      The patient was recommended to seek individual counseling through his insurance company as an adjunct treatment to the prescribed medications.

## 2021-09-14 ASSESSMENT — ANXIETY QUESTIONNAIRES: GAD7 TOTAL SCORE: 11

## 2021-10-18 DIAGNOSIS — F41.1 GAD (GENERALIZED ANXIETY DISORDER): ICD-10-CM

## 2021-10-19 ENCOUNTER — MYC REFILL (OUTPATIENT)
Dept: FAMILY MEDICINE | Facility: CLINIC | Age: 46
End: 2021-10-19

## 2021-10-19 DIAGNOSIS — G43.809 OTHER MIGRAINE WITHOUT STATUS MIGRAINOSUS, NOT INTRACTABLE: ICD-10-CM

## 2021-10-19 DIAGNOSIS — F41.1 GAD (GENERALIZED ANXIETY DISORDER): ICD-10-CM

## 2021-10-19 RX ORDER — BUTALBITAL, ACETAMINOPHEN AND CAFFEINE 50; 325; 40 MG/1; MG/1; MG/1
TABLET ORAL
Qty: 30 TABLET | Refills: 0 | Status: SHIPPED | OUTPATIENT
Start: 2021-10-19 | End: 2022-02-14

## 2021-10-19 NOTE — TELEPHONE ENCOUNTER
Routing refill request to provider for review/approval because:  Labs not current:    Creatinine   Date Value Ref Range Status   08/07/2020 1.21 0.66 - 1.25 mg/dL Final     Lesly Mixon RN

## 2021-10-20 RX ORDER — VENLAFAXINE HYDROCHLORIDE 37.5 MG/1
CAPSULE, EXTENDED RELEASE ORAL
Qty: 60 CAPSULE | Refills: 0 | OUTPATIENT
Start: 2021-10-20

## 2021-10-20 RX ORDER — VENLAFAXINE HYDROCHLORIDE 75 MG/1
CAPSULE, EXTENDED RELEASE ORAL
Qty: 30 CAPSULE | Refills: 0 | Status: SHIPPED | OUTPATIENT
Start: 2021-10-20 | End: 2021-11-22

## 2021-10-20 NOTE — TELEPHONE ENCOUNTER
"See pt message.    Requested Prescriptions   Pending Prescriptions Disp Refills    venlafaxine (EFFEXOR-XR) 37.5 MG 24 hr capsule 60 capsule 0     Sig: Take one tablet/capsule daily in the in the morning  for 7 days and then  two tablets/capsules daily in the morning  there after.        Serotonin-Norepinephrine Reuptake Inhibitors  Failed - 10/19/2021 10:01 AM        Failed - Normal serum creatinine on file in past 12 months     Recent Labs   Lab Test 08/07/20  0850   CR 1.21       Ok to refill medication if creatinine is low          Passed - Blood pressure under 140/90 in past 12 months       BP Readings from Last 3 Encounters:   06/07/21 128/83   01/23/20 128/88   12/23/19 137/80                 Passed - Recent (12 mo) or future (30 days) visit within the authorizing provider's specialty     Patient has had an office visit with the authorizing provider or a provider within the authorizing providers department within the previous 12 mos or has a future within next 30 days. See \"Patient Info\" tab in inbasket, or \"Choose Columns\" in Meds & Orders section of the refill encounter.              Passed - Medication is active on med list        Passed - Patient is age 18 or older              "

## 2021-10-23 ENCOUNTER — HEALTH MAINTENANCE LETTER (OUTPATIENT)
Age: 46
End: 2021-10-23

## 2021-11-22 DIAGNOSIS — F41.1 GAD (GENERALIZED ANXIETY DISORDER): ICD-10-CM

## 2021-11-22 RX ORDER — VENLAFAXINE HYDROCHLORIDE 75 MG/1
75 CAPSULE, EXTENDED RELEASE ORAL EVERY MORNING
Qty: 30 CAPSULE | Refills: 0 | Status: SHIPPED | OUTPATIENT
Start: 2021-11-22 | End: 2021-12-06

## 2021-11-22 NOTE — TELEPHONE ENCOUNTER
Routing refill request to provider for review/approval because:  Cande given x1 and patient did not follow up, please advise  Yaneli Cleveland BSN, RN

## 2021-12-06 ENCOUNTER — MYC MEDICAL ADVICE (OUTPATIENT)
Dept: FAMILY MEDICINE | Facility: CLINIC | Age: 46
End: 2021-12-06
Payer: COMMERCIAL

## 2021-12-06 ENCOUNTER — MYC REFILL (OUTPATIENT)
Dept: FAMILY MEDICINE | Facility: CLINIC | Age: 46
End: 2021-12-06
Payer: COMMERCIAL

## 2021-12-06 DIAGNOSIS — F41.1 GAD (GENERALIZED ANXIETY DISORDER): ICD-10-CM

## 2021-12-06 RX ORDER — VENLAFAXINE HYDROCHLORIDE 75 MG/1
75 CAPSULE, EXTENDED RELEASE ORAL EVERY MORNING
Qty: 30 CAPSULE | Refills: 0 | Status: SHIPPED | OUTPATIENT
Start: 2021-12-06 | End: 2022-02-04

## 2021-12-06 RX ORDER — VENLAFAXINE HYDROCHLORIDE 75 MG/1
75 CAPSULE, EXTENDED RELEASE ORAL EVERY MORNING
Qty: 30 CAPSULE | Refills: 0 | Status: CANCELLED | OUTPATIENT
Start: 2021-12-06

## 2021-12-07 RX ORDER — VENLAFAXINE HYDROCHLORIDE 75 MG/1
CAPSULE, EXTENDED RELEASE ORAL
Qty: 30 CAPSULE | Refills: 0 | OUTPATIENT
Start: 2021-12-07

## 2022-02-04 DIAGNOSIS — F41.1 GAD (GENERALIZED ANXIETY DISORDER): ICD-10-CM

## 2022-02-04 RX ORDER — VENLAFAXINE HYDROCHLORIDE 75 MG/1
CAPSULE, EXTENDED RELEASE ORAL
Qty: 90 CAPSULE | Refills: 0 | Status: SHIPPED | OUTPATIENT
Start: 2022-02-04 | End: 2022-06-03

## 2022-02-04 NOTE — TELEPHONE ENCOUNTER
"Requested Prescriptions   Pending Prescriptions Disp Refills     venlafaxine (EFFEXOR-XR) 75 MG 24 hr capsule [Pharmacy Med Name: VENLAFAXINE ER 75MG CAPSULES] 30 capsule 0     Sig: TAKE 1 CAPSULE BY MOUTH EVERY MORNING       Serotonin-Norepinephrine Reuptake Inhibitors  Failed - 2/4/2022  9:41 AM        Failed - Normal serum creatinine on file in past 12 months     Recent Labs   Lab Test 08/07/20  0850   CR 1.21       Ok to refill medication if creatinine is low          Passed - Blood pressure under 140/90 in past 12 months     BP Readings from Last 3 Encounters:   06/07/21 128/83   01/23/20 128/88   12/23/19 137/80                 Passed - Recent (12 mo) or future (30 days) visit within the authorizing provider's specialty     Patient has had an office visit with the authorizing provider or a provider within the authorizing providers department within the previous 12 mos or has a future within next 30 days. See \"Patient Info\" tab in inbasket, or \"Choose Columns\" in Meds & Orders section of the refill encounter.              Passed - Medication is active on med list        Passed - Patient is age 18 or older           Julisa NAIRN, RN    "

## 2022-02-11 DIAGNOSIS — G43.809 OTHER MIGRAINE WITHOUT STATUS MIGRAINOSUS, NOT INTRACTABLE: ICD-10-CM

## 2022-02-14 RX ORDER — BUTALBITAL, ACETAMINOPHEN AND CAFFEINE 50; 325; 40 MG/1; MG/1; MG/1
TABLET ORAL
Qty: 30 TABLET | Refills: 0 | Status: SHIPPED | OUTPATIENT
Start: 2022-02-14 | End: 2022-04-25

## 2022-04-25 DIAGNOSIS — G43.809 OTHER MIGRAINE WITHOUT STATUS MIGRAINOSUS, NOT INTRACTABLE: ICD-10-CM

## 2022-04-25 RX ORDER — BUTALBITAL, ACETAMINOPHEN AND CAFFEINE 50; 325; 40 MG/1; MG/1; MG/1
TABLET ORAL
Qty: 30 TABLET | Refills: 1 | Status: SHIPPED | OUTPATIENT
Start: 2022-04-25 | End: 2022-10-14

## 2022-04-25 NOTE — TELEPHONE ENCOUNTER
Routing refill request to provider for review/approval because:  Drug or supplies are not on [place] [facility] [place]     Julisa NAIRN, RN

## 2022-05-25 ENCOUNTER — OFFICE VISIT (OUTPATIENT)
Dept: URGENT CARE | Facility: URGENT CARE | Age: 47
End: 2022-05-25
Payer: COMMERCIAL

## 2022-05-25 VITALS
RESPIRATION RATE: 16 BRPM | WEIGHT: 208 LBS | SYSTOLIC BLOOD PRESSURE: 150 MMHG | TEMPERATURE: 99.3 F | OXYGEN SATURATION: 96 % | BODY MASS INDEX: 29.84 KG/M2 | DIASTOLIC BLOOD PRESSURE: 80 MMHG | HEART RATE: 62 BPM

## 2022-05-25 DIAGNOSIS — R03.0 ELEVATED BLOOD PRESSURE READING WITHOUT DIAGNOSIS OF HYPERTENSION: ICD-10-CM

## 2022-05-25 DIAGNOSIS — Z20.822 EXPOSURE TO 2019 NOVEL CORONAVIRUS: Primary | ICD-10-CM

## 2022-05-25 DIAGNOSIS — F41.9 ANXIETY: ICD-10-CM

## 2022-05-25 DIAGNOSIS — R07.0 THROAT PAIN: ICD-10-CM

## 2022-05-25 LAB — DEPRECATED S PYO AG THROAT QL EIA: NEGATIVE

## 2022-05-25 PROCEDURE — U0003 INFECTIOUS AGENT DETECTION BY NUCLEIC ACID (DNA OR RNA); SEVERE ACUTE RESPIRATORY SYNDROME CORONAVIRUS 2 (SARS-COV-2) (CORONAVIRUS DISEASE [COVID-19]), AMPLIFIED PROBE TECHNIQUE, MAKING USE OF HIGH THROUGHPUT TECHNOLOGIES AS DESCRIBED BY CMS-2020-01-R: HCPCS | Performed by: FAMILY MEDICINE

## 2022-05-25 PROCEDURE — U0005 INFEC AGEN DETEC AMPLI PROBE: HCPCS | Performed by: FAMILY MEDICINE

## 2022-05-25 PROCEDURE — 87651 STREP A DNA AMP PROBE: CPT | Performed by: FAMILY MEDICINE

## 2022-05-25 PROCEDURE — 99214 OFFICE O/P EST MOD 30 MIN: CPT | Performed by: FAMILY MEDICINE

## 2022-05-25 ASSESSMENT — PAIN SCALES - GENERAL: PAINLEVEL: NO PAIN (0)

## 2022-05-26 LAB
GROUP A STREP BY PCR: NOT DETECTED
SARS-COV-2 RNA RESP QL NAA+PROBE: POSITIVE

## 2022-05-26 NOTE — PROGRESS NOTES
Chief complaint: covid exposure    Son and wife have covid    BP elevated today but asymptomatic. No headache no blurring of vision no chest pain no shortness of breath no dizziness no unsteadiness no numbness no weakness    This afternoon started having sweats body aches  Joint body aches achy    Patient wants to make sure it's not strep  Because symptoms are similar to strep    Patient has anxiety  No thoughts of harming self or others   Wheezing: No  Chest pain or exertional shortness of breath: NO   Exposure to pertussis or pertussis like symptoms: No  Orthopnea, worsening edema, pnd: NO  Rash: NO  Tried OTC medications without relief  No hemoptysis.  Worsening symptoms hence patient came in to be seen     Problem list and histories reviewed & adjusted, as indicated.  Additional history: as documented    Problem list, Medication list, Allergies, and Medical/Social/Surgical histories reviewed in EPIC and updated as appropriate.    ROS:  Constitutional, HEENT, cardiovascular, pulmonary, gi and gu systems are negative, except as otherwise noted.    OBJECTIVE:                                                    BP (!) 150/80   Pulse 62   Temp 99.3  F (37.4  C) (Tympanic)   Resp 16   Wt 94.3 kg (208 lb)   SpO2 96%   BMI 29.84 kg/m    Body mass index is 29.84 kg/m .  GENERAL: healthy, alert and no distress  EYES: pink palpebral conjunctiva, anicteric sclera  ENT: midline nasal septum normal ear exam. congested sinuses.   Mouth: moist buccal mucosa nonhyperemic posterior pharyngeal wall. No tonsillar enlargement or cellulitis  NECK: no adenopathy, no asymmetry, masses, or scars and thyroid normal to palpation  RESP: lungs clear to auscultation - No  rales, rhonchi or wheezes    CV: regular rate and rhythm, normal S1 S2, no S3 or S4,  No murmurs, click or rub  SKIN: no visible rashes noted  Pscyh: Appropriate mood and affect  MS: no gross musculoskeletal defects noted    Diagnostic Test Results:  Results for orders  placed or performed in visit on 05/25/22 (from the past 24 hour(s))   Streptococcus A Rapid Screen w/Reflex to PCR - Clinic Collect    Specimen: Throat; Swab   Result Value Ref Range    Group A Strep antigen Negative Negative        ASSESSMENT/PLAN:                                                        ICD-10-CM    1. Exposure to 2019 novel coronavirus  Z20.822 Symptomatic; Unknown COVID-19 Virus (Coronavirus) by PCR Nose   2. Throat pain  R07.0 Symptomatic; Unknown COVID-19 Virus (Coronavirus) by PCR Nose     Streptococcus A Rapid Screen w/Reflex to PCR - Clinic Collect     Group A Streptococcus PCR Throat Swab   3. Anxiety  F41.9    4. Elevated blood pressure reading without diagnosis of hypertension  R03.0        Suspect covid   Recommend treatment  Due to his anxiety if positive he will set up a virtual appointment to discuss treatment  Alarm signs or symptoms discussed, if present recommend go to ER   If with any symptoms of chest pain or shortness of breath, lightheadedness, palpitations, feeling like passing out or change and worsening in the quality of your symptoms, please proceed to ER. Recommend follow up with PCP in a few days for re-evaluation.   Over the counter medications discussed.   Aware to come back in if with worsening symptoms or if no relief despite treatment plan  Patient voiced understanding and had no further questions.     Your blood pressure reading was elevated today.  Recommend that you have it re-checked either at home or at our clinic within a week  Avoid cold medicines that have pseudoephedrin in it, or Sudafed. You may take regular or plain Robitussin or Mucinex  If you are unsure, please ask the pharmacist    If your blood pressure top number (systolic) 180 and above, or the bottom number (diastolic) 120 and above, you need to be seen immediately.  If persistently elevated top number 140 and above, or the bottom number 90 and above, please schedule an appointment to see a provider  in clinic within a week.  If you have very elevated blood pressures, accompanied by headache, chest pain, numbness, weakness, slurring of speech, confusion, difficulty walking, call 911 and go to the ER    MD Ema Basurto MD  [facility] [place]

## 2022-05-27 ENCOUNTER — VIRTUAL VISIT (OUTPATIENT)
Dept: FAMILY MEDICINE | Facility: CLINIC | Age: 47
End: 2022-05-27
Payer: COMMERCIAL

## 2022-05-27 ENCOUNTER — TELEPHONE (OUTPATIENT)
Dept: FAMILY MEDICINE | Facility: CLINIC | Age: 47
End: 2022-05-27

## 2022-05-27 DIAGNOSIS — R06.2 WHEEZING: ICD-10-CM

## 2022-05-27 DIAGNOSIS — U07.1 INFECTION DUE TO 2019 NOVEL CORONAVIRUS: Primary | ICD-10-CM

## 2022-05-27 DIAGNOSIS — G47.09 OTHER INSOMNIA: ICD-10-CM

## 2022-05-27 PROCEDURE — 99213 OFFICE O/P EST LOW 20 MIN: CPT | Mod: GT | Performed by: PHYSICIAN ASSISTANT

## 2022-05-27 ASSESSMENT — PATIENT HEALTH QUESTIONNAIRE - PHQ9
SUM OF ALL RESPONSES TO PHQ QUESTIONS 1-9: 7
10. IF YOU CHECKED OFF ANY PROBLEMS, HOW DIFFICULT HAVE THESE PROBLEMS MADE IT FOR YOU TO DO YOUR WORK, TAKE CARE OF THINGS AT HOME, OR GET ALONG WITH OTHER PEOPLE: SOMEWHAT DIFFICULT
SUM OF ALL RESPONSES TO PHQ QUESTIONS 1-9: 7

## 2022-05-27 NOTE — PATIENT INSTRUCTIONS
"  Dear Ignacio Poole    After reviewing your responses, I've been able to diagnose you with \"tinea\" which is a common skin condition caused by a fungal infection. There are many common names for this depending on where it is located and it's appearance (jock itch, athlete's foot, ringworm, etc).  Based on your responses, I have prescribed an antifungal cream to treat this. Please follow the instructions on the medication. If you experience irritation of your skin, new rash, or any other new symptoms, you should stop using this medication and contact your primary care provider.  If this treatment does not work for you in 2 weeks or after completing treatment, please plan to follow-up with your primary care provider to evaluate in-person.  Self-care:  Wash all items that come into contact with infected skin: Wash all towels, clothes, and bedding in hot water. Use laundry soap. Clean shower stalls, mats, and floors with a germ-killing or fungus-killing .   Do not share personal items: Do not share towels, brushes, hayward, or hair accessories.    Keep your skin, hair, and nails clean and dry: Bathe every day, and dry your skin before you put medicine on the infected area. Wash your hands often. Do not scratch your sores. This may cause the infection to spread.   Avoid infected pets: A patch of missing fur is a sign of infection in a pet. Take your infected pet to a  for treatment.  Contact your primary healthcare provider if:  You have a fever.    Your infection continues to spread after 7 days of treatment.   Your rash is not gone in 2 weeks.   The area around your rash becomes red, warm, tender, swollen, or smells bad.   You have questions or concerns about your condition or care.    Thanks for choosing?us?as your health care partner,    Gabo Wylie PA-C  " Oral Minoxidil Counseling- I discussed with the patient the risks of oral minoxidil including but not limited to shortness of breath, swelling of the feet or ankles, dizziness, lightheadedness, unwanted hair growth and allergic reaction.  The patient verbalized understanding of the proper use and possible adverse effects of oral minoxidil.  All of the patient's questions and concerns were addressed.

## 2022-05-27 NOTE — TELEPHONE ENCOUNTER
I already discussed decreasing trazodone dose by 1/2 during Paxlovid therapy. His mother is picking up the rx so no need to discuss with her.

## 2022-05-27 NOTE — PROGRESS NOTES
John is a 46 year old who is being evaluated via a billable video visit.      How would you like to obtain your AVS? MyChart  If the video visit is dropped, the invitation should be resent by: Text to cell phone: [phone]  Will anyone else be joining your video visit? No      Video Start Time: 1242    Assessment & Plan       ICD-10-CM    1. Infection due to 2019 novel coronavirus  U07.1 nirmatrelvir and ritonavir (PAXLOVID) therapy pack   2. Wheezing  R06.2    3. Other insomnia  G47.09      - Patient at high risk for severe infection due to remote history of asthma. No indications for steroids at this time due to reported symptoms. Meets criteria for Paxlovid. Take as directed. Continue supportive cares.  - Discussed risk of concomitant trazodone use with Paxlovid; cut dose in half during therapy.    No follow-ups on file.    FERNANDO Flores [facility]   John is a 46 year old who presents for the following health issues     HPI       COVID-19 Symptom Review  How many days ago did these symptoms start? Tuesday Noon    Are any of the following symptoms significant for you?    New or worsening difficulty breathing? No    Worsening cough? Yes, I am coughing up mucus.    Fever or chills? Yes, I felt feverish or had chills.    Headache: YES    Sore throat: no    Chest pain: no    Diarrhea: no    Body aches? YES    What treatments has patient tried? guaffenisen expectorant   Does patient live in a nursing home, group home, or shelter? no  Does patient have a way to get food/medications during quarantined? Yes, I have a friend or family member who can help me.    - Does have history exercise-induced asthma, has not required inhaler for several years. Does report some wheezing with current illness, but no SOB.    Review of Systems   Constitutional, HEENT, cardiovascular, pulmonary, GI, , musculoskeletal, neuro, skin, endocrine and psych systems are negative, except as  otherwise noted.      Objective           Vitals:  No vitals were obtained today due to virtual visit.    Physical Exam   GENERAL: Healthy, alert and no distress  EYES: Eyes grossly normal to inspection.  No discharge or erythema, or obvious scleral/conjunctival abnormalities.  HENT: Normal cephalic/atraumatic.  External ears, nose and mouth without ulcers or lesions.  No nasal drainage visible.  NECK: No asymmetry, visible masses or scars  RESP: No audible wheeze, cough, or visible cyanosis.  No visible retractions or increased work of breathing.    MS: No gross musculoskeletal defects noted.  Normal range of motion.  No visible edema.  SKIN: Visible skin clear. No significant rash, abnormal pigmentation or lesions.  PSYCH: Mentation appears normal, affect normal/bright, judgement and insight intact, normal speech and appearance well-groomed.                Video-Visit Details    Type of service:  Video Visit    Video End Time:1254    Originating Location (pt. Location): Home    Distant Location (provider location):  [facility]     Platform used for Video Visit: [employer]

## 2022-05-27 NOTE — PATIENT INSTRUCTIONS
"  Instructions for Patients  Your COVID-19 test was positive. This means you have the virus. Please follow the \"How can I take care of myself\" and \"How do I self-isolate?\" guidelines in these instructions.    What treatments are available?  Over-the-counter medicines may help with your symptoms such as runny or stuffy nose, cough, chills, and fever. Talk to your care team about your options.     Some people are at high risk for severe illness (for example if you have a weak immune system, you're 65 or older, or you have certain medical problems). If your risk it high and your symptoms started in the last 5 to 7 days, we strongly recommend for you to get COVID treatment as soon as possible before you get really sick. Paxlovid, Molnupiravir and the monoclonal antibody treatments are proven safe and effective, make you feel better faster, and prevent hospitalization and death.       You can schedule an appointment to discuss COVID treatment by requesting an appointment on [employer] by selecting \"schedule COVID-19 Treatment\" or by calling [employer] ([phone]) and pressing 7.    What are the symptoms of COVID-19?  Symptoms can include fever, cough, shortness of breath, chills, headache, muscle pain sore throat, fatigue, runny or stuffy nose, and loss of taste and smell. Other less common symptoms include nausea, vomiting, or diarrhea (watery stools).    Know when to call 911. Emergency warning signs include:    Trouble breathing or shortness of breath    Pain or pressure in the chest that doesn't go away    Feeling confused like you haven't felt before, or not being able to wake up    Bluish-colored lips or face    How can I take care of myself?  1. Get lots of rest. Drink extra fluids (unless a doctor has told you not to).  2. Take Tylenol (acetaminophen) for fever or pain. If you have liver or kidney problems, ask your family doctor if it's okay to take Tylenol   Adults:   650 mg (two 325 mg pills or tablets) " every 4 to 6 hours, or...   1,000 mg (two 500 mg pills or tablets) every 8 hours as needed.  Note: Don't take more than 3,000 mg in one day. Acetaminophen is found in many medicines (both prescribed and over the counter). Read all labels to be sure you don't take too much.  For children, check the Tylenol bottle for the right dose. The dose is based on the child's age or weight.  3. Take over the counter medicines for your symptoms as needed. Talk to your pharmacist.  4. If you have other health problems (like cancer, heart failure, an organ transplant, or severe kidney disease): Call your specialty clinic if you don't feel better in the next 2 days.    These guidelines are for isolating and quarantining before returning to work, school or .     For employers, schools and day cares: This is an official notice for this person s medical guidelines for returning in-person.     For health care sites: The CDC gives different isolation and quarantine guidelines for healthcare sites, please check with these sites before arriving.     How do I self-isolate?  You isolate when you have symptoms of COVID or a test shows you have COVID, even if you don t have symptoms.     If you DO have symptoms:  o Stay home and away from others  - For at least 5 days after your symptoms started, AND   - You are fever free for 24 hours (with no medicine that reduces fever), AND  - Your other symptoms are better.  o Wear a mask for 10 full days any time you are around others.    If you DON T have symptoms:  o Stay at home and away from others for at least 5 days after your positive test.  o Wear a mask for 10 full days any time you are around others.    How and when do I quarantine?  You quarantine when you may have been exposed to the virus and DON T have symptoms.     Stay home and away from others.     You must quarantine for 5 days after your last contact with a person who has COVID.  o Note: If you are fully vaccinated, you don t  need to quarantine. You should still follow the steps below.     Wear a mask for 10 full days any time you re around others.    Get tested at least 5 days after you were exposed, even if you don t have symptoms.     If you start to have symptoms, isolate right away and get tested.    Where can I get more information?    [facility] COVID-19 Resource Hub: www.[email or web address].org/covid19/     CDC Quarantine & Isolation: https://www.cdc.gov/coronavirus/2019-ncov/your-health/quarantine-isolation.html     CDC - What to Do If You're Sick: https://www.cdc.gov/coronavirus/2019-ncov/if-you-are-sick/index.html    [facility] clinical trials (COVID-19 research studies): [email or web address]    [place] Department of Health COVID-19 Public Hotline: 1-471.950.7024

## 2022-05-27 NOTE — TELEPHONE ENCOUNTER
Trazodone has drug drug interaction and recommendation is to reduce the Trazodone dose by 50%.  Do you want us to  John to take 1/2 tablet of Trazodone instead of a full tablet?      Thank you,  Sultana Lentz [facility]  [phone]

## 2022-06-03 DIAGNOSIS — F41.1 GAD (GENERALIZED ANXIETY DISORDER): ICD-10-CM

## 2022-06-03 RX ORDER — VENLAFAXINE HYDROCHLORIDE 75 MG/1
CAPSULE, EXTENDED RELEASE ORAL
Qty: 90 CAPSULE | Refills: 0 | Status: SHIPPED | OUTPATIENT
Start: 2022-06-03 | End: 2022-09-13

## 2022-06-03 NOTE — TELEPHONE ENCOUNTER
"Routing refill request to provider for review/approval because:  Failed protocol.  Thank you. Rosario Myers R.N.  Requested Prescriptions   Pending Prescriptions Disp Refills    venlafaxine (EFFEXOR XR) 75 MG 24 hr capsule [Pharmacy Med Name: VENLAFAXINE ER 75MG CAPSULES] 90 capsule 0     Sig: TAKE 1 CAPSULE BY MOUTH EVERY MORNING        Serotonin-Norepinephrine Reuptake Inhibitors  Failed - 6/3/2022  3:16 PM        Failed - Blood pressure under 140/90 in past 12 months       BP Readings from Last 3 Encounters:   05/25/22 (!) 150/80   06/07/21 128/83   01/23/20 128/88                 Failed - Normal serum creatinine on file in past 12 months     Recent Labs   Lab Test 08/07/20  0850   CR 1.21       Ok to refill medication if creatinine is low          Passed - Recent (12 mo) or future (30 days) visit within the authorizing provider's specialty     Patient has had an office visit with the authorizing provider or a provider within the authorizing providers department within the previous 12 mos or has a future within next 30 days. See \"Patient Info\" tab in inbasket, or \"Choose Columns\" in Meds & Orders section of the refill encounter.              Passed - Medication is active on med list        Passed - Patient is age 18 or older              "

## 2022-06-03 NOTE — PATIENT INSTRUCTIONS
Preventive Health Recommendations  Male Ages 40 to 49    Yearly exam:             See your health care provider every year in order to  o   Review health changes.   o   Discuss preventive care.    o   Review your medicines if your doctor has prescribed any.  You should be tested each year for STDs (sexually transmitted diseases) if you re at risk.   Have a cholesterol test every 5 years.   Have a colonoscopy (test for colon cancer) if someone in your family has had colon cancer or polyps before age 50.   After age 45, have a diabetes test (fasting glucose). If you are at risk for diabetes, you should have this test every 3 years.    Talk with your health care provider about whether or not a prostate cancer screening test (PSA) is right for you.    Shots: Get a flu shot each year. Get a tetanus shot every 10 years.     Nutrition:  Eat at least 5 servings of fruits and vegetables daily.   Eat whole-grain bread, whole-wheat pasta and brown rice instead of white grains and rice.   Get adequate Calcium and Vitamin D.     Lifestyle  Exercise for at least 150 minutes a week (30 minutes a day, 5 days a week). This will help you control your weight and prevent disease.   Limit alcohol to one drink per day.   No smoking.   Wear sunscreen to prevent skin cancer.   See your dentist every six months for an exam and cleaning.      Please schedule a future laboratory appointment(s) and be sure to have fasted for 10 hours prior to arrival

## 2022-06-05 ASSESSMENT — ENCOUNTER SYMPTOMS
ABDOMINAL PAIN: 0
HEMATURIA: 0
DIZZINESS: 0
PALPITATIONS: 0
HEMATOCHEZIA: 1
MYALGIAS: 0
WEAKNESS: 0
ARTHRALGIAS: 0
SORE THROAT: 0
DIARRHEA: 0
HEARTBURN: 0
PARESTHESIAS: 0
CHILLS: 0
CONSTIPATION: 0
FREQUENCY: 0
FEVER: 0
COUGH: 0
SHORTNESS OF BREATH: 0
DYSURIA: 0
EYE PAIN: 0
JOINT SWELLING: 0
NERVOUS/ANXIOUS: 0
NAUSEA: 0
HEADACHES: 0

## 2022-06-05 ASSESSMENT — PATIENT HEALTH QUESTIONNAIRE - PHQ9
10. IF YOU CHECKED OFF ANY PROBLEMS, HOW DIFFICULT HAVE THESE PROBLEMS MADE IT FOR YOU TO DO YOUR WORK, TAKE CARE OF THINGS AT HOME, OR GET ALONG WITH OTHER PEOPLE: SOMEWHAT DIFFICULT
SUM OF ALL RESPONSES TO PHQ QUESTIONS 1-9: 9
SUM OF ALL RESPONSES TO PHQ QUESTIONS 1-9: 9

## 2022-06-06 ENCOUNTER — OFFICE VISIT (OUTPATIENT)
Dept: FAMILY MEDICINE | Facility: CLINIC | Age: 47
End: 2022-06-06
Payer: COMMERCIAL

## 2022-06-06 VITALS
WEIGHT: 207 LBS | TEMPERATURE: 97.7 F | SYSTOLIC BLOOD PRESSURE: 120 MMHG | HEART RATE: 71 BPM | BODY MASS INDEX: 29.63 KG/M2 | OXYGEN SATURATION: 98 % | HEIGHT: 70 IN | RESPIRATION RATE: 16 BRPM | DIASTOLIC BLOOD PRESSURE: 73 MMHG

## 2022-06-06 DIAGNOSIS — Z12.11 SCREEN FOR COLON CANCER: Primary | ICD-10-CM

## 2022-06-06 DIAGNOSIS — Z11.59 ENCOUNTER FOR HEPATITIS C SCREENING TEST FOR LOW RISK PATIENT: ICD-10-CM

## 2022-06-06 DIAGNOSIS — E78.1 HIGH TRIGLYCERIDES: ICD-10-CM

## 2022-06-06 DIAGNOSIS — Z00.00 ROUTINE GENERAL MEDICAL EXAMINATION AT A HEALTH CARE FACILITY: ICD-10-CM

## 2022-06-06 DIAGNOSIS — R73.02 IGT (IMPAIRED GLUCOSE TOLERANCE): ICD-10-CM

## 2022-06-06 DIAGNOSIS — E78.6 HDL DEFICIENCY: ICD-10-CM

## 2022-06-06 DIAGNOSIS — Z13.6 CARDIOVASCULAR SCREENING; LDL GOAL LESS THAN 160: ICD-10-CM

## 2022-06-06 PROCEDURE — 99396 PREV VISIT EST AGE 40-64: CPT | Performed by: FAMILY MEDICINE

## 2022-06-06 ASSESSMENT — PAIN SCALES - GENERAL: PAINLEVEL: NO PAIN (0)

## 2022-06-06 NOTE — PROGRESS NOTES
SUBJECTIVE:   CC: Ignacio Poole is an 46 year old male who presents for preventative health visit.       Patient has been advised of split billing requirements and indicates understanding: Yes  Healthy Habits:     Getting at least 3 servings of Calcium per day:  Yes    Bi-annual eye exam:  Yes    Dental care twice a year:  Yes    Sleep apnea or symptoms of sleep apnea:  Daytime drowsiness    Diet:  Carbohydrate counting    Frequency of exercise:  1 day/week    Duration of exercise:  Less than 15 minutes    Taking medications regularly:  Yes    Medication side effects:  Not applicable    PHQ-2 Total Score: 2    Additional concerns today:  No              Today's PHQ-2 Score:   PHQ-2 ( 1999 Pfizer) 6/5/2022   Q1: Little interest or pleasure in doing things 1   Q2: Feeling down, depressed or hopeless 1   PHQ-2 Score 2   PHQ-2 Total Score (12-17 Years)- Positive if 3 or more points; Administer PHQ-A if positive -   Q1: Little interest or pleasure in doing things Several days   Q2: Feeling down, depressed or hopeless Several days   PHQ-2 Score 2       Abuse: Current or Past(Physical, Sexual or Emotional)- No  Do you feel safe in your environment? Yes    Have you ever done Advance Care Planning? (For example, a Health Directive, POLST, or a discussion with a medical provider or your loved ones about your wishes): No, advance care planning information given to patient to review.  Patient declined advance care planning discussion at this time.    Social History     Tobacco Use     Smoking status: Never Smoker     Smokeless tobacco: Never Used   Substance Use Topics     Alcohol use: No     If you drink alcohol do you typically have >3 drinks per day or >7 drinks per week? No    Alcohol Use 6/6/2022   Prescreen: >3 drinks/day or >7 drinks/week? -   Prescreen: >3 drinks/day or >7 drinks/week? No       Last PSA:   PSA   Date Value Ref Range Status   06/07/2021 0.53 0 - 4 ug/L Final     Comment:     Assay Method:   "Chemiluminescence using Siemens Vista analyzer       Reviewed orders with patient. Reviewed health maintenance and updated orders accordingly -       Reviewed and updated as needed this visit by clinical staff   Tobacco  Allergies  Meds   Med Hx  Surg Hx  Fam Hx  Soc Hx          Reviewed and updated as needed this visit by Provider   Tobacco                     Review of Systems      OBJECTIVE:   /73   Pulse 71   Temp 97.7  F (36.5  C) (Tympanic)   Resp 16   Ht 1.778 m (5' 10\")   Wt 93.9 kg (207 lb)   SpO2 98%   BMI 29.70 kg/m      Physical Exam      Diagnostic Test Results:  Labs reviewed in Epic    ASSESSMENT/PLAN:       ICD-10-CM    1. Screen for colon cancer  Z12.11    2. Routine general medical examination at a health care facility  Z00.00    3. Encounter for hepatitis C screening test for low risk patient  Z11.59 Hepatitis C Screen Reflex to HCV RNA Quant and Genotype   4. IGT (impaired glucose tolerance)  R73.02 Comprehensive metabolic panel (BMP + Alb, Alk Phos, ALT, AST, Total. Bili, TP)   5. High triglycerides  E78.1 Lipid panel reflex to direct LDL Fasting     Comprehensive metabolic panel (BMP + Alb, Alk Phos, ALT, AST, Total. Bili, TP)   6. HDL deficiency  E78.6 Lipid panel reflex to direct LDL Fasting     Comprehensive metabolic panel (BMP + Alb, Alk Phos, ALT, AST, Total. Bili, TP)   7. CARDIOVASCULAR SCREENING; LDL GOAL LESS THAN 160  Z13.6 Lipid panel reflex to direct LDL Fasting     Comprehensive metabolic panel (BMP + Alb, Alk Phos, ALT, AST, Total. Bili, TP)       Patient has been advised of split billing requirements and indicates understanding: Yes    COUNSELING:   Reviewed preventive health counseling, as reflected in patient instructions       Regular exercise       Healthy diet/nutrition       Vision screening       Aspirin prophylaxis        Family planning       Consider Hep C screening for all patients one time for ages 18-79 years       HIV screeninx in teen " "years, 1x in adult years, and at intervals if high risk       Osteoporosis prevention/bone health    Estimated body mass index is 29.7 kg/m  as calculated from the following:    Height as of this encounter: 1.778 m (5' 10\").    Weight as of this encounter: 93.9 kg (207 lb).     Weight management plan: Discussed healthy diet and exercise guidelines    He reports that he has never smoked. He has never used smokeless tobacco.      Counseling Resources:  ATP IV Guidelines  Pooled Cohorts Equation Calculator  FRAX Risk Assessment  ICSI Preventive Guidelines  Dietary Guidelines for Americans, 2010  "[employer]s MyPlate  ASA Prophylaxis  Lung CA Screening    Matthieu Monzon MD  [facility]      Answers for HPI/ROS submitted by the patient on 6/5/2022  If you checked off any problems, how difficult have these problems made it for you to do your work, take care of things at home, or get along with other people?: Somewhat difficult  PHQ9 TOTAL SCORE: 9  --------------------------------------------------------------------------------------------------------------------------------------  SUBJECTIVE:  Ignacio Poole is a 46 year old male who presents to the clinic today for a routine physical exam.    The patient's last physical was  6-7-21.    Cholesterol   Date Value Ref Range Status   08/07/2020 195 <200 mg/dL Final   04/11/2017 160 <200 mg/dL Final     HDL Cholesterol   Date Value Ref Range Status   08/07/2020 33 (L) >39 mg/dL Final   04/11/2017 40 >39 mg/dL Final     LDL Cholesterol Calculated   Date Value Ref Range Status   08/07/2020 111 (H) <100 mg/dL Final     Comment:     Above desirable:  100-129 mg/dl  Borderline High:  130-159 mg/dL  High:             160-189 mg/dL  Very high:       >189 mg/dl     04/11/2017 72 <100 mg/dL Final     Comment:     Desirable:       <100 mg/dl     Triglycerides   Date Value Ref Range Status   08/07/2020 256 (H) <150 mg/dL Final     Comment:     Borderline high:  " 150-199 mg/dl  High:             200-499 mg/dl  Very high:       >499 mg/dl  Fasting specimen     04/11/2017 238 (H) <150 mg/dL Final     Comment:     Borderline high:  150-199 mg/dl   High:             200-499 mg/dl   Very high:       >499 mg/dl   Fasting specimen       Cholesterol/HDL Ratio   Date Value Ref Range Status   11/06/2014 5.5 (H) 0.0 - 5.0 Final   06/04/2004 5.3@       The patient's last fasting lipid panel was done 2 years ago and the results are listed above.      The 10-year ASCVD risk score (Bonita FLOYD JrLizzette, et al., 2013) is: 3.2%    Values used to calculate the score:      Age: 46 years      Sex: Male      Is Non- : No      Diabetic: No      Tobacco smoker: No      Systolic Blood Pressure: 120 mmHg      Is BP treated: No      HDL Cholesterol: 33 mg/dL      Total Cholesterol: 195 mg/dL      Risk Enhancers:  Family history of premature ASCVD- No  LDL >159- Unknown  Chronic kidney disease- No  Metabolic Syndrome- No  Premature menopause- No  Inflammatory conditions (RA, psoriasis, HIV)- No  SE  Ancestry- No  Triglycerides >174- No      The patient reports that he has never been treated for high blood pressure.    The patient reports that he does not take a daily aspirin.    No results found for: HCVAB  The patient reports that he has not been screened for Hepatitis C    (Screen all baby boomers once per CDC-- the generation born from 1945 through 1965 and per USPTF screen age 19 to 79 especially younger people who have used IV drugs)  He would like to have an Hepatitis C test today    Lab Results   Component Value Date    HIAGAB Nonreactive 08/07/2020     The patient reports that he has been screened for HIV   (Screen all 15 to 64 years old)  He would not like to have an HIV test today      Immunization History   Administered Date(s) Administered     COVID-19,PF,Pfizer (12+ Yrs) 04/19/2021, 05/11/2021     Flu, Unspecified 08/25/2020     Influenza (IIV3) PF 10/26/2002,  11/12/2011, 10/29/2012     Influenza Vaccine IM > 6 months Valent IIV4 (Alfuria,Fluzone) 12/17/2013, 10/05/2019     Influenza Vaccine, 6+MO IM (QUADRIVALENT W/PRESERVATIVES) 10/30/2018, 09/16/2021     TD (ADULT, 7+) 06/04/2004     TDAP Vaccine (Adacel) 02/22/2013     The patient's believes that his last tetanus shot was given 9 year(s) ago.   The patient believes that he has not had a Shingrix in the past  The patient believes that he has not had a PPSV23 in the past.  The patient believes that he has not had a PCV13 in the past.  The patient believes that he has not had a seasonal flu vaccination this fall or winter.  The patient would like to have a no vaccinations today      No results found for this or any previous visit.]   The patient denies a family history of colon cancer.  The patient reports that he has not had a colonoscopy.    The patient reports that he does performs a self testicular exam monthly.  His currently used contraception is a vasectomy.  The patient denies a family history of diabetes.  The patient reports a family history of prostate cancer in his grandfather.    The patient reports that he eats or drinks 4 servings of dairy products per day.       The patient reports that he has dental appointments approximately every 6 months.  The patient reports that he  has an eye examination approximately every 2 year(s).    Do you currently smoke? No  How many years have you smoked? 0   How many packs per day did you smoke on average? N/A  (if more than 30 pack year history and the patient is age 55-80 consider ordering an annual low dose radiation lung CT to screen for cancer)  (Do not order if patient has quit more than 15 years ago or has a health condition that limits life expectancy or could not tolerate curative lung surgery)  Are you interested having a lung CT to screen for lung cancer? N/A    If the patient has smoked more that 100 cigarettes, has the patient had an imaging study (US or CT)  for an AAA between the ages of 65 and 75? N/A            Patient Active Problem List   Diagnosis     CARDIOVASCULAR SCREENING; LDL GOAL LESS THAN 160     Insomnia     Eczema     IGT (impaired glucose tolerance)     High triglycerides     HDL deficiency     10 year risk of MI or stroke < 7.5%, 1.1% in April 2017     Other migraine without status migrainosus, not intractable       Past Surgical History:   Procedure Laterality Date     BIOPSY  youth    lymph node neck     BIOPSY  8/27/85    lymph node neck     ORTHOPEDIC SURGERY  5/2019    Broken left clavicle. Plate installed.       Family History   Problem Relation Age of Onset     Breast Cancer Mother      Gastrointestinal Disease Father         crohns     Asthma Father      Genetic Disorder Father         crohns disease     Prostate Cancer Paternal Grandfather      Diabetes Paternal Grandfather      Gastrointestinal Disease Sister         crohns     Asthma Sister      Genetic Disorder Sister         crohns disease       Social History     Socioeconomic History     Marital status:      Spouse name: Not on file     Number of children: Not on file     Years of education: Not on file     Highest education level: Not on file   Occupational History     Not on file   Tobacco Use     Smoking status: Never Smoker     Smokeless tobacco: Never Used   Vaping Use     Vaping Use: Never used   Substance and Sexual Activity     Alcohol use: No     Drug use: No     Sexual activity: Yes     Partners: Female     Birth control/protection: Abstinence, Male Surgical     Comment: interested in male surgical   Other Topics Concern     Parent/sibling w/ CABG, MI or angioplasty before 65F 55M? No   Social History Narrative     Not on file     Social Determinants of Health     Financial Resource Strain: Not on file   Food Insecurity: Not on file   Transportation Needs: Not on file   Physical Activity: Not on file   Stress: Not on file   Social Connections: Not on file   Intimate  "Partner Violence: Not on file   Housing Stability: Not on file       Current Outpatient Medications   Medication Sig Dispense Refill     butalbital-acetaminophen-caffeine (ESGIC) -40 MG tablet TAKE 1 TABLET BY MOUTH EVERY 4 HOURS AS NEEDED 30 tablet 1     traZODone (DESYREL) 50 MG tablet Take 1 tablet (50 mg) by mouth nightly as needed for sleep 90 tablet 1     venlafaxine (EFFEXOR XR) 75 MG 24 hr capsule TAKE 1 CAPSULE BY MOUTH EVERY MORNING 90 capsule 0       Review Of Systems    Gastrointestinal: positive for bright BRIGHT RED BLOOD PER RECTUM after bowel movements x 4 when taking Paxlavoid.     PHYSICAL EXAMINATION:  Blood pressure 120/73, pulse 71, temperature 97.7  F (36.5  C), temperature source Tympanic, resp. rate 16, height 1.778 m (5' 10\"), weight 93.9 kg (207 lb), SpO2 98 %.  General appearance - healthy, alert and no distress  Skin - Skin color, texture, turgor normal. No rashes or lesions.  Head - Normocephalic. No masses, lesions, tenderness or abnormalities  Eyes - conjunctivae/corneas clear. PERRL, EOM's intact. Fundi benign  Ears - External ears normal. Canals clear. TM's normal.  Nose/Sinuses - Nares normal. Septum midline. Mucosa normal. No drainage or sinus tenderness.  Oropharynx - Lips, mucosa, and tongue normal. Teeth and gums normal.  Neck - Neck supple. No adenopathy. Thyroid symmetric, normal size,  Lungs - Percussion normal. Good diaphragmatic excursion. Lungs clear  Heart - PMI normal. No lifts, heaves, or thrills. RRR. No murmurs, clicks gallops or rub  Abdomen - Abdomen soft, non-tender. BS normal. No masses, organomegaly  Extremities - Extremities normal. No deformities, edema, or skin discoloration.  Musculoskeletal - Spine ROM normal. Muscular strength intact.  Peripheral pulses - radial=4/4, femoral=4/4, popliteal=4/4, dorsalis pedis=4/4,  Neuro - Gait normal. Reflexes normal and symmetric. Sensation grossly WNL.  Genitalia - Penis normal. No urethral discharge. Scrotum " normal to palpation. No hernia.  Rectal - Rectal negative. Prostate palpation negative.  No rectal masses or abnormalities  ANOSCOPE with patient standing bent at waist: 1 large external hemorrhoid at 2:00  Multiple small internal hemorrhoids without bleeding    Office Visit on 05/25/2022   Component Date Value Ref Range Status     SARS CoV2 PCR 05/25/2022 Positive (A) Negative Final    POSITIVE: SARS-CoV-2 (COVID-19) RNA detected, presumed positive.     Group A Strep antigen 05/25/2022 Negative  Negative Final     Group A strep by PCR 05/25/2022 Not Detected  Not Detected Final       ASSESSMENT:    ICD-10-CM    1. Screen for colon cancer  Z12.11    2. Routine general medical examination at a health care facility  Z00.00        Well-Adult Physical Exam.  Health Maintenance Due   Topic Date Due     ANNUAL REVIEW OF HM ORDERS  Never done     COLORECTAL CANCER SCREENING  Never done     HEPATITIS C SCREENING  Never done     COVID-19 Vaccine (3 - Booster for Pfizer series) 10/11/2021     Health Maintenance   Topic Date Due     ANNUAL REVIEW OF HM ORDERS  Never done     COLORECTAL CANCER SCREENING  Never done     HEPATITIS C SCREENING  Never done     COVID-19 Vaccine (3 - Booster for Pfizer series) 10/11/2021     DTAP/TDAP/TD IMMUNIZATION (3 - Td or Tdap) 02/22/2023     PREVENTIVE CARE VISIT  06/06/2023     LIPID  08/07/2025     ADVANCE CARE PLANNING  06/06/2027     HIV SCREENING  Completed     MIGRAINE ACTION PLAN  Completed     PHQ-2 (once per calendar year)  Completed     INFLUENZA VACCINE  Completed     Pneumococcal Vaccine: Pediatrics (0 to 5 Years) and At-Risk Patients (6 to 64 Years)  Aged Out     IPV IMMUNIZATION  Aged Out     MENINGITIS IMMUNIZATION  Aged Out     HEPATITIS B IMMUNIZATION  Aged Out         HEALTH CARE MAINTENENCE: The recommended screening tests and vaccinatons for this patient have been discussed as above.  The appropriate tests and vaccinations  have been ordered or declined by the patient.  Please see the orders in EPIC.The patient specifically declines: n/a     Immunization Status:  up to date and documented    Patient Active Problem List   Diagnosis     CARDIOVASCULAR SCREENING; LDL GOAL LESS THAN 160     Insomnia     Eczema     IGT (impaired glucose tolerance)     High triglycerides     HDL deficiency     10 year risk of MI or stroke < 7.5%, 1.1% in April 2017     Other migraine without status migrainosus, not intractable        ATP III Guidelines  ICSI Preventive Guidelines    PLAN:   The patient will make a future lab appointment for all bloodwork as they are not fasting today  Check a fasting lipid profile  Hepatitis C antibody ordered  HIV testing was discussed but the pt declined  Testicular self-exam encouraged  Check a fasting glucose  Discussed calcium intake, vitamins and supplements. Recommended 1000 mg of calcium daily  Weight loss through diet and exercise was recommended  Sunscreen use was recommended especially in the area of tatoos  Recommended dental exams every 6 months  Recommended eye exam every 1-2 years  Follow up in 1 year for the next preventative medical visit      Body mass index is 29.7 kg/m .

## 2022-06-06 NOTE — NURSING NOTE
"Chief Complaint   Patient presents with     Physical       Initial BP (!) 154/77   Pulse 71   Temp 97.7  F (36.5  C) (Tympanic)   Resp 16   Ht 1.778 m (5' 10\")   Wt 93.9 kg (207 lb)   SpO2 98%   BMI 29.70 kg/m   Estimated body mass index is 29.7 kg/m  as calculated from the following:    Height as of this encounter: 1.778 m (5' 10\").    Weight as of this encounter: 93.9 kg (207 lb).  Medication Reconciliation: complete  Irais Gaytan, SURESH  "

## 2022-06-13 ENCOUNTER — MYC MEDICAL ADVICE (OUTPATIENT)
Dept: FAMILY MEDICINE | Facility: CLINIC | Age: 47
End: 2022-06-13
Payer: COMMERCIAL

## 2022-06-13 DIAGNOSIS — J45.990 EXERCISE-INDUCED ASTHMA: Primary | ICD-10-CM

## 2022-06-13 RX ORDER — ALBUTEROL SULFATE 90 UG/1
AEROSOL, METERED RESPIRATORY (INHALATION)
Qty: 18 G | Refills: 1 | Status: SHIPPED | OUTPATIENT
Start: 2022-06-13 | End: 2024-04-19

## 2022-06-18 ENCOUNTER — LAB (OUTPATIENT)
Dept: LAB | Facility: CLINIC | Age: 47
End: 2022-06-18
Payer: COMMERCIAL

## 2022-06-18 DIAGNOSIS — E78.6 HDL DEFICIENCY: ICD-10-CM

## 2022-06-18 DIAGNOSIS — Z11.59 ENCOUNTER FOR HEPATITIS C SCREENING TEST FOR LOW RISK PATIENT: ICD-10-CM

## 2022-06-18 DIAGNOSIS — R73.02 IGT (IMPAIRED GLUCOSE TOLERANCE): ICD-10-CM

## 2022-06-18 DIAGNOSIS — E78.1 HIGH TRIGLYCERIDES: ICD-10-CM

## 2022-06-18 DIAGNOSIS — Z13.6 CARDIOVASCULAR SCREENING; LDL GOAL LESS THAN 160: ICD-10-CM

## 2022-06-18 LAB
ALBUMIN SERPL-MCNC: 3.9 G/DL (ref 3.4–5)
ALP SERPL-CCNC: 82 U/L (ref 40–150)
ALT SERPL W P-5'-P-CCNC: 37 U/L (ref 0–70)
ANION GAP SERPL CALCULATED.3IONS-SCNC: 5 MMOL/L (ref 3–14)
AST SERPL W P-5'-P-CCNC: 24 U/L (ref 0–45)
BILIRUB SERPL-MCNC: 1.1 MG/DL (ref 0.2–1.3)
BUN SERPL-MCNC: 11 MG/DL (ref 7–30)
CALCIUM SERPL-MCNC: 8.6 MG/DL (ref 8.5–10.1)
CHLORIDE BLD-SCNC: 106 MMOL/L (ref 94–109)
CHOLEST SERPL-MCNC: 182 MG/DL
CO2 SERPL-SCNC: 29 MMOL/L (ref 20–32)
CREAT SERPL-MCNC: 1.04 MG/DL (ref 0.66–1.25)
FASTING STATUS PATIENT QL REPORTED: YES
GFR SERPL CREATININE-BSD FRML MDRD: 90 ML/MIN/1.73M2
GLUCOSE BLD-MCNC: 104 MG/DL (ref 70–99)
HDLC SERPL-MCNC: 32 MG/DL
LDLC SERPL CALC-MCNC: 82 MG/DL
LDLC SERPL CALC-MCNC: ABNORMAL MG/DL
NONHDLC SERPL-MCNC: 150 MG/DL
POTASSIUM BLD-SCNC: 4 MMOL/L (ref 3.4–5.3)
PROT SERPL-MCNC: 6.9 G/DL (ref 6.8–8.8)
SODIUM SERPL-SCNC: 140 MMOL/L (ref 133–144)
TRIGL SERPL-MCNC: 444 MG/DL

## 2022-06-18 PROCEDURE — 83721 ASSAY OF BLOOD LIPOPROTEIN: CPT | Mod: 59

## 2022-06-18 PROCEDURE — 36415 COLL VENOUS BLD VENIPUNCTURE: CPT

## 2022-06-18 PROCEDURE — 80053 COMPREHEN METABOLIC PANEL: CPT

## 2022-06-18 PROCEDURE — 86803 HEPATITIS C AB TEST: CPT

## 2022-06-18 PROCEDURE — 80061 LIPID PANEL: CPT

## 2022-06-20 LAB — HCV AB SERPL QL IA: NONREACTIVE

## 2022-09-13 ENCOUNTER — MYC REFILL (OUTPATIENT)
Dept: FAMILY MEDICINE | Facility: CLINIC | Age: 47
End: 2022-09-13

## 2022-09-13 DIAGNOSIS — F41.1 GAD (GENERALIZED ANXIETY DISORDER): ICD-10-CM

## 2022-09-15 RX ORDER — VENLAFAXINE HYDROCHLORIDE 75 MG/1
CAPSULE, EXTENDED RELEASE ORAL
Qty: 90 CAPSULE | Refills: 0 | Status: SHIPPED | OUTPATIENT
Start: 2022-09-15 | End: 2022-12-26

## 2022-09-15 NOTE — TELEPHONE ENCOUNTER
Patient is scheduled. Patient has seen Dr. Leyva in the past and wishes to establish care with him. Patient is out of medication and is requesting a bridge medication until appt.   Future Appointments   Date Time Provider Department Center   10/14/2022 11:00 AM Dominick Leyva MD [facility] CLIN     Usha Fitzgerald,     [facility]

## 2022-09-27 ENCOUNTER — TELEPHONE (OUTPATIENT)
Dept: FAMILY MEDICINE | Facility: CLINIC | Age: 47
End: 2022-09-27

## 2022-09-27 NOTE — TELEPHONE ENCOUNTER
Patient Quality Outreach    Patient is due for the following:   Asthma  -  ACT needed and Asthma follow-up visit  Colon Cancer Screening      Topic Date Due     Pneumococcal Vaccine (1 - PCV) Never done     COVID-19 Vaccine (3 - Booster for Pfizer series) 07/06/2021     Flu Vaccine (1) 09/01/2022       Next Steps:   Schedule a office visit for Asthma and colon cancer screen    Type of outreach:    Sent [employer] message.      Questions for provider review:    None     Betzy Mueller

## 2022-10-07 ASSESSMENT — ANXIETY QUESTIONNAIRES
7. FEELING AFRAID AS IF SOMETHING AWFUL MIGHT HAPPEN: SEVERAL DAYS
2. NOT BEING ABLE TO STOP OR CONTROL WORRYING: NOT AT ALL
6. BECOMING EASILY ANNOYED OR IRRITABLE: SEVERAL DAYS
5. BEING SO RESTLESS THAT IT IS HARD TO SIT STILL: NOT AT ALL
7. FEELING AFRAID AS IF SOMETHING AWFUL MIGHT HAPPEN: SEVERAL DAYS
8. IF YOU CHECKED OFF ANY PROBLEMS, HOW DIFFICULT HAVE THESE MADE IT FOR YOU TO DO YOUR WORK, TAKE CARE OF THINGS AT HOME, OR GET ALONG WITH OTHER PEOPLE?: SOMEWHAT DIFFICULT
GAD7 TOTAL SCORE: 6
1. FEELING NERVOUS, ANXIOUS, OR ON EDGE: SEVERAL DAYS
4. TROUBLE RELAXING: MORE THAN HALF THE DAYS
GAD7 TOTAL SCORE: 6
IF YOU CHECKED OFF ANY PROBLEMS ON THIS QUESTIONNAIRE, HOW DIFFICULT HAVE THESE PROBLEMS MADE IT FOR YOU TO DO YOUR WORK, TAKE CARE OF THINGS AT HOME, OR GET ALONG WITH OTHER PEOPLE: SOMEWHAT DIFFICULT
GAD7 TOTAL SCORE: 6
3. WORRYING TOO MUCH ABOUT DIFFERENT THINGS: SEVERAL DAYS

## 2022-10-07 ASSESSMENT — ASTHMA QUESTIONNAIRES
QUESTION_4 LAST FOUR WEEKS HOW OFTEN HAVE YOU USED YOUR RESCUE INHALER OR NEBULIZER MEDICATION (SUCH AS ALBUTEROL): NOT AT ALL
ACT_TOTALSCORE: 24
QUESTION_1 LAST FOUR WEEKS HOW MUCH OF THE TIME DID YOUR ASTHMA KEEP YOU FROM GETTING AS MUCH DONE AT WORK, SCHOOL OR AT HOME: NONE OF THE TIME
QUESTION_2 LAST FOUR WEEKS HOW OFTEN HAVE YOU HAD SHORTNESS OF BREATH: NOT AT ALL
QUESTION_5 LAST FOUR WEEKS HOW WOULD YOU RATE YOUR ASTHMA CONTROL: WELL CONTROLLED
ACT_TOTALSCORE: 24
QUESTION_3 LAST FOUR WEEKS HOW OFTEN DID YOUR ASTHMA SYMPTOMS (WHEEZING, COUGHING, SHORTNESS OF BREATH, CHEST TIGHTNESS OR PAIN) WAKE YOU UP AT NIGHT OR EARLIER THAN USUAL IN THE MORNING: NOT AT ALL

## 2022-10-09 ENCOUNTER — HEALTH MAINTENANCE LETTER (OUTPATIENT)
Age: 47
End: 2022-10-09

## 2022-10-13 ASSESSMENT — ANXIETY QUESTIONNAIRES: GAD7 TOTAL SCORE: 6

## 2022-10-13 NOTE — PROGRESS NOTES
John is a 47 year old who is being evaluated via a billable video visit.      How would you like to obtain your AVS? MyChart  If the video visit is dropped, the invitation should be resent by: Text to cell phone: [phone]  Will anyone else be joining your video visit? No    ASSESSMENT / PLAN:  (F41.1) CLAUDINE (generalized anxiety disorder)  (primary encounter diagnosis)  Comment: stable  Plan: continue effexor. Exercise and limt caffeine/ ALCOHOL. If SUICIAL IDEATION OR HOMOCIDAL IDEATION OR EFREN TO ER. Follow-up cpe/labs in June.     (G43.809) Other migraine without status migrainosus, not intractable  Comment: stable  Plan: butalbital-acetaminophen-caffeine (ESGIC)         -40 MG tablet        Prn. Exercise.     (J45.990) Exercise-induced asthma  Comment: stable  Plan: prn albuterol.     (L98.9) Skin lesion  Comment:  nodule vs ?  Plan: cover and closely monitor. Digital photo with tape measure. Derm follow-up if growing.         Subjective   John is a 47 year old presenting for the following health issues:  Follow-up migraines, insomnia,exercise induced asthma and anxiety.  effexor working well. Trazodone 1/2 pill - average 2x/month.   Migraines 1-2x/month - triggers caffeine withdrawal/dehydration. 1-2 cups coffee/day. Limited ALCOHOL and pop.    and 4 children (2 out/grown). .   Exercise - active at home. Walking. Lower carb.    No SUICIAL IDEATION OR HOMOCIDAL IDEATION OR EFREN.   No chest pain or shortness of breath.   Skin lesion leg - over the counter meds. stratching at times. No lesions elsewhere.   No chief complaint on file.      History of Present Illness       Mental Health Follow-up:  Patient presents to follow-up on Anxiety.    Patient's anxiety since last visit has been:  Better  The patient is not having other symptoms associated with anxiety.  Any significant life events: No  Patient is not feeling anxious or having panic attacks.  Patient has no concerns about  alcohol or drug use.    He eats 2-3 servings of fruits and vegetables daily.He consumes 2 sweetened beverage(s) daily.He exercises with enough effort to increase his heart rate 9 or less minutes per day.  He exercises with enough effort to increase his heart rate 3 or less days per week.   He is taking medications regularly.  Today's CLAUDINE-7 Score: 6       Review of Systems         Objective           Vitals:  No vitals were obtained today due to virtual visit.    Physical Exam   GENERAL: Healthy, alert and no distress  EYES: Eyes grossly normal to inspection.  No discharge or erythema, or obvious scleral/conjunctival abnormalities.  RESP: No audible wheeze, cough, or visible cyanosis.  No visible retractions or increased work of breathing.    SKIN: shallow about 1 cm red ulceration left medial shin.   NEURO: Cranial nerves grossly intact.  Mentation and speech appropriate for age.  PSYCH: Mentation appears normal, affect normal/bright, judgement and insight intact, normal speech and appearance well-groomed.      Video-Visit Details    Video Start Time: 10:50 AM    Type of service:  Video Visit    Video End Time:11:00 AM    Originating Location (pt. Location): Home      Platform used for Video Visit: AWAK

## 2022-10-14 ENCOUNTER — VIRTUAL VISIT (OUTPATIENT)
Dept: FAMILY MEDICINE | Facility: CLINIC | Age: 47
End: 2022-10-14
Payer: COMMERCIAL

## 2022-10-14 DIAGNOSIS — L98.9 SKIN LESION: ICD-10-CM

## 2022-10-14 DIAGNOSIS — J45.990 EXERCISE-INDUCED ASTHMA: ICD-10-CM

## 2022-10-14 DIAGNOSIS — F41.1 GAD (GENERALIZED ANXIETY DISORDER): Primary | ICD-10-CM

## 2022-10-14 DIAGNOSIS — G43.809 OTHER MIGRAINE WITHOUT STATUS MIGRAINOSUS, NOT INTRACTABLE: ICD-10-CM

## 2022-10-14 PROCEDURE — 99214 OFFICE O/P EST MOD 30 MIN: CPT | Mod: GT | Performed by: FAMILY MEDICINE

## 2022-10-14 RX ORDER — BUTALBITAL, ACETAMINOPHEN AND CAFFEINE 50; 325; 40 MG/1; MG/1; MG/1
1 TABLET ORAL EVERY 4 HOURS PRN
Qty: 30 TABLET | Refills: 1 | Status: SHIPPED | OUTPATIENT
Start: 2022-10-14 | End: 2023-04-25

## 2022-12-05 ENCOUNTER — MYC MEDICAL ADVICE (OUTPATIENT)
Dept: FAMILY MEDICINE | Facility: CLINIC | Age: 47
End: 2022-12-05

## 2022-12-05 DIAGNOSIS — J30.81 ALLERGIC RHINITIS DUE TO ANIMALS: Primary | ICD-10-CM

## 2023-02-28 ENCOUNTER — MYC MEDICAL ADVICE (OUTPATIENT)
Dept: FAMILY MEDICINE | Facility: CLINIC | Age: 48
End: 2023-02-28
Payer: COMMERCIAL

## 2023-02-28 DIAGNOSIS — F51.04 PSYCHOPHYSIOLOGICAL INSOMNIA: ICD-10-CM

## 2023-03-01 RX ORDER — TRAZODONE HYDROCHLORIDE 50 MG/1
50 TABLET, FILM COATED ORAL
Qty: 90 TABLET | Refills: 1 | Status: SHIPPED | OUTPATIENT
Start: 2023-03-01

## 2023-03-21 ENCOUNTER — MYC MEDICAL ADVICE (OUTPATIENT)
Dept: FAMILY MEDICINE | Facility: CLINIC | Age: 48
End: 2023-03-21
Payer: COMMERCIAL

## 2023-03-23 NOTE — TELEPHONE ENCOUNTER
DIAGNOSIS: left knee sharp apin   APPOINTMENT DATE: 04/05/2023   NOTES STATUS DETAILS   OFFICE NOTE from referring provider Internal 03/21[facility]2023 [facility]   OFFICE NOTE from other specialist N/A    DISCHARGE SUMMARY from hospital N/A    DISCHARGE REPORT from the ER N/A    OPERATIVE REPORT N/A    EMG report N/A    MEDICATION LIST N/A    MRI N/A    DEXA (osteoporosis/bone health) N/A    CT SCAN N/A    XRAYS (IMAGES & REPORTS) N/A

## 2023-04-05 ENCOUNTER — PRE VISIT (OUTPATIENT)
Dept: ORTHOPEDICS | Facility: CLINIC | Age: 48
End: 2023-04-05

## 2023-04-05 ENCOUNTER — ANCILLARY PROCEDURE (OUTPATIENT)
Dept: GENERAL RADIOLOGY | Facility: CLINIC | Age: 48
End: 2023-04-05
Attending: PREVENTIVE MEDICINE
Payer: COMMERCIAL

## 2023-04-05 ENCOUNTER — OFFICE VISIT (OUTPATIENT)
Dept: ORTHOPEDICS | Facility: CLINIC | Age: 48
End: 2023-04-05
Payer: COMMERCIAL

## 2023-04-05 DIAGNOSIS — M25.562 LEFT KNEE PAIN: ICD-10-CM

## 2023-04-05 DIAGNOSIS — M17.12 PATELLOFEMORAL ARTHRITIS OF LEFT KNEE: Primary | ICD-10-CM

## 2023-04-05 DIAGNOSIS — M25.562 ACUTE PAIN OF LEFT KNEE: Primary | ICD-10-CM

## 2023-04-05 DIAGNOSIS — M17.12 PATELLOFEMORAL ARTHRITIS OF LEFT KNEE: ICD-10-CM

## 2023-04-05 PROCEDURE — 73562 X-RAY EXAM OF KNEE 3: CPT | Mod: LT | Performed by: RADIOLOGY

## 2023-04-05 PROCEDURE — 99204 OFFICE O/P NEW MOD 45 MIN: CPT | Performed by: PREVENTIVE MEDICINE

## 2023-04-05 RX ORDER — MELOXICAM 15 MG/1
15 TABLET ORAL DAILY
Qty: 30 TABLET | Refills: 0 | Status: SHIPPED | OUTPATIENT
Start: 2023-04-05 | End: 2023-06-15

## 2023-04-05 RX ORDER — MELOXICAM 15 MG/1
15 TABLET ORAL DAILY
Qty: 30 TABLET | Refills: 0 | Status: SHIPPED | OUTPATIENT
Start: 2023-04-05 | End: 2023-11-09

## 2023-04-05 NOTE — LETTER
4/5/2023         RE: Ignacio Poole  [address] [facility] [id number]        Dear Colleague,    Thank you for referring your patient, Ignacio Poole, to the [facility] [place]. Please see a copy of my visit note below.    HISTORY OF PRESENT ILLNESS  Mr. Poole is a pleasant 47 year old year old male who presents to clinic today with the following:  What problem are you here for? Left knee pain, when kneeling on hard surface    How long have you had this problem? 3 months    Have you had any recent imaging of this problem? Xrays/MRI/CT scans? yes    Have you had treatments for this problem in the past?  -Medications? no  -Physical therapy? no  -Injections? no  -Surgery? no    How severe is this problem today? 0-10 scale? 1    How severe has this problem been at WORST in the past? 0-10 scale? 8    What do you think caused this problem? Unsure, only hurts when he kneels on a hard surface    Does this problem or its symptoms cause difficulty for you falling asleep or staying asleep? no    Anything else you want us to know about this problem? no          MEDICAL HISTORY  Patient Active Problem List   Diagnosis     CARDIOVASCULAR SCREENING; LDL GOAL LESS THAN 160     Insomnia     Eczema     IGT (impaired glucose tolerance)     High triglycerides     HDL deficiency     10 year risk of MI or stroke < 7.5%, 1.1% in April 2017     Other migraine without status migrainosus, not intractable       Current Outpatient Medications   Medication Sig Dispense Refill     albuterol (PROAIR HFA/PROVENTIL HFA/VENTOLIN HFA) 108 (90 Base) MCG/ACT inhaler Take 2 puffs 15 minute(s) before exercise 18 g 1     butalbital-acetaminophen-caffeine (ESGIC) -40 MG tablet Take 1 tablet by mouth every 4 hours as needed 30 tablet 1     traZODone (DESYREL) 50 MG tablet Take 1 tablet (50 mg) by mouth nightly as needed for sleep 90 tablet 1     venlafaxine (EFFEXOR XR) 75 MG 24 hr capsule TAKE 1  CAPSULE BY MOUTH EVERY MORNING 90 capsule 2       No Known Allergies    Family History   Problem Relation Age of Onset     Breast Cancer Mother      Gastrointestinal Disease Father         crohns     Asthma Father      Genetic Disorder Father         crohns disease     Prostate Cancer Paternal Grandfather      Diabetes Paternal Grandfather      Gastrointestinal Disease Sister         crohns     Asthma Sister      Genetic Disorder Sister         crohns disease     Social History     Socioeconomic History     Marital status:    Tobacco Use     Smoking status: Never     Smokeless tobacco: Never   Vaping Use     Vaping status: Never Used   Substance and Sexual Activity     Alcohol use: No     Drug use: No     Sexual activity: Yes     Partners: Female     Birth control/protection: Abstinence, Male Surgical     Comment: interested in male surgical   Other Topics Concern     Parent/sibling w/ CABG, MI or angioplasty before 65F 55M? No       Additional medical/Social/Surgical histories reviewed in [facility] and updated as appropriate.     REVIEW OF SYSTEMS (4/5/2023)  10 point ROS of systems including Constitutional, Eyes, Respiratory, Cardiovascular, Gastroenterology, Genitourinary, Integumentary, Musculoskeletal, Psychiatric, Allergic/Immunologic were all negative except for pertinent positives noted in my HPI.     PHYSICAL EXAM  VSS    General  - normal appearance, in no obvious distress  HEENT  - conjunctivae not injected, moist mucous membranes, normocephalic/atraumatic head, ears normal appearance, no lesions, mouth normal appearance, no scars, normal dentition and teeth present  CV  - normal popliteal pulse  Pulm  - normal respiratory pattern, non-labored  Musculoskeletal - left knee  - stance: normal gait without limp, no obvious leg length discrepancy, single-leg squat exhibits knee valgus, internal rotation of the hip, contralateral hip drop  - inspection: no swelling or effusion, normal muscle tone, normal bone  and joint alignment, no obvious deformity  - palpation: no joint line tenderness, patellar tendon non-tender, tender medial patellar facet  - ROM: 135 degrees flexion, -5 degrees extension, not painful, crepitus with weight-bearing flexion  - strength: 5/5 in flexion, 5/5 in extension  - neuro: no sensory or motor deficit  - special tests:  (-) Lachman  (-) anterior drawer  (-) Hailey  (-) Thessaly  (-) varus at 0 and 30 degrees flexion  (-) valgus at 0 and 30 degrees flexion  (+) Babatunde s compression test  (+) patellar grind  (-) patellar apprehension  Neuro  - no sensory or motor deficit, grossly normal coordination, normal muscle tone  Skin  - no ecchymosis, erythema, warmth, or induration, no obvious rash  Psych  - interactive, appropriate, normal mood and affect  ASSESSMENT & PLAN  46 yo male with left knee patellofemoral arthritis    I independently reviewed the following imaging studies:  Xrays knee: shows some pf arthritis  Discussed and given mobic  Given HEP  Consider PT  F/u 1 month consider CSI  And PT  Patient has been doing home exercise physical therapy program for this problem      Appropriate PPE was utilized for prevention of spread of Covid-19.  Harris Vazquez MD, CAM          Again, thank you for allowing me to participate in the care of your patient.        Sincerely,        Harris Vazquez MD

## 2023-04-05 NOTE — PROGRESS NOTES
HISTORY OF PRESENT ILLNESS  Mr. Poole is a pleasant 47 year old year old male who presents to clinic today with the following:  What problem are you here for? Left knee pain, when kneeling on hard surface    How long have you had this problem? 3 months    Have you had any recent imaging of this problem? Xrays/MRI/CT scans? yes    Have you had treatments for this problem in the past?  -Medications? no  -Physical therapy? no  -Injections? no  -Surgery? no    How severe is this problem today? 0-10 scale? 1    How severe has this problem been at WORST in the past? 0-10 scale? 8    What do you think caused this problem? Unsure, only hurts when he kneels on a hard surface    Does this problem or its symptoms cause difficulty for you falling asleep or staying asleep? no    Anything else you want us to know about this problem? no          MEDICAL HISTORY  Patient Active Problem List   Diagnosis     CARDIOVASCULAR SCREENING; LDL GOAL LESS THAN 160     Insomnia     Eczema     IGT (impaired glucose tolerance)     High triglycerides     HDL deficiency     10 year risk of MI or stroke < 7.5%, 1.1% in April 2017     Other migraine without status migrainosus, not intractable       Current Outpatient Medications   Medication Sig Dispense Refill     albuterol (PROAIR HFA/PROVENTIL HFA/VENTOLIN HFA) 108 (90 Base) MCG/ACT inhaler Take 2 puffs 15 minute(s) before exercise 18 g 1     butalbital-acetaminophen-caffeine (ESGIC) -40 MG tablet Take 1 tablet by mouth every 4 hours as needed 30 tablet 1     traZODone (DESYREL) 50 MG tablet Take 1 tablet (50 mg) by mouth nightly as needed for sleep 90 tablet 1     venlafaxine (EFFEXOR XR) 75 MG 24 hr capsule TAKE 1 CAPSULE BY MOUTH EVERY MORNING 90 capsule 2       No Known Allergies    Family History   Problem Relation Age of Onset     Breast Cancer Mother      Gastrointestinal Disease Father         crohns     Asthma Father      Genetic Disorder Father         crohns disease      Prostate Cancer Paternal Grandfather      Diabetes Paternal Grandfather      Gastrointestinal Disease Sister         crohns     Asthma Sister      Genetic Disorder Sister         crohns disease     Social History     Socioeconomic History     Marital status:    Tobacco Use     Smoking status: Never     Smokeless tobacco: Never   Vaping Use     Vaping status: Never Used   Substance and Sexual Activity     Alcohol use: No     Drug use: No     Sexual activity: Yes     Partners: Female     Birth control/protection: Abstinence, Male Surgical     Comment: interested in male surgical   Other Topics Concern     Parent/sibling w/ CABG, MI or angioplasty before 65F 55M? No       Additional medical/Social/Surgical histories reviewed in [facility] and updated as appropriate.     REVIEW OF SYSTEMS (4/5/2023)  10 point ROS of systems including Constitutional, Eyes, Respiratory, Cardiovascular, Gastroenterology, Genitourinary, Integumentary, Musculoskeletal, Psychiatric, Allergic/Immunologic were all negative except for pertinent positives noted in my HPI.     PHYSICAL EXAM  VSS    General  - normal appearance, in no obvious distress  HEENT  - conjunctivae not injected, moist mucous membranes, normocephalic/atraumatic head, ears normal appearance, no lesions, mouth normal appearance, no scars, normal dentition and teeth present  CV  - normal popliteal pulse  Pulm  - normal respiratory pattern, non-labored  Musculoskeletal - left knee  - stance: normal gait without limp, no obvious leg length discrepancy, single-leg squat exhibits knee valgus, internal rotation of the hip, contralateral hip drop  - inspection: no swelling or effusion, normal muscle tone, normal bone and joint alignment, no obvious deformity  - palpation: no joint line tenderness, patellar tendon non-tender, tender medial patellar facet  - ROM: 135 degrees flexion, -5 degrees extension, not painful, crepitus with weight-bearing flexion  - strength: 5/5 in flexion,  5/5 in extension  - neuro: no sensory or motor deficit  - special tests:  (-) Lachman  (-) anterior drawer  (-) Hailey  (-) Thessaly  (-) varus at 0 and 30 degrees flexion  (-) valgus at 0 and 30 degrees flexion  (+) Babatunde s compression test  (+) patellar grind  (-) patellar apprehension  Neuro  - no sensory or motor deficit, grossly normal coordination, normal muscle tone  Skin  - no ecchymosis, erythema, warmth, or induration, no obvious rash  Psych  - interactive, appropriate, normal mood and affect  ASSESSMENT & PLAN  46 yo male with left knee patellofemoral arthritis    I independently reviewed the following imaging studies:  Xrays knee: shows some pf arthritis  Discussed and given mobic  Given HEP  Consider PT  F/u 1 month consider CSI  And PT  Patient has been doing home exercise physical therapy program for this problem      Appropriate PPE was utilized for prevention of spread of Covid-19.  Harris Vazquez MD, CAM

## 2023-04-22 DIAGNOSIS — G43.809 OTHER MIGRAINE WITHOUT STATUS MIGRAINOSUS, NOT INTRACTABLE: ICD-10-CM

## 2023-04-25 RX ORDER — BUTALBITAL, ACETAMINOPHEN AND CAFFEINE 50; 325; 40 MG/1; MG/1; MG/1
1 TABLET ORAL EVERY 4 HOURS PRN
Qty: 30 TABLET | Refills: 1 | Status: SHIPPED | OUTPATIENT
Start: 2023-04-25 | End: 2023-09-28

## 2023-05-08 ENCOUNTER — PATIENT OUTREACH (OUTPATIENT)
Dept: CARE COORDINATION | Facility: CLINIC | Age: 48
End: 2023-05-08

## 2023-05-08 ENCOUNTER — OFFICE VISIT (OUTPATIENT)
Dept: ORTHOPEDICS | Facility: CLINIC | Age: 48
End: 2023-05-08
Payer: COMMERCIAL

## 2023-05-08 DIAGNOSIS — M23.232 DERANG OF MEDIAL MENISCUS DUE TO OLD TEAR/INJ, LEFT KNEE: ICD-10-CM

## 2023-05-08 DIAGNOSIS — M17.12 PATELLOFEMORAL ARTHRITIS OF LEFT KNEE: Primary | ICD-10-CM

## 2023-05-08 PROCEDURE — 99214 OFFICE O/P EST MOD 30 MIN: CPT | Mod: 25 | Performed by: PREVENTIVE MEDICINE

## 2023-05-08 PROCEDURE — 20610 DRAIN/INJ JOINT/BURSA W/O US: CPT | Mod: LT | Performed by: PREVENTIVE MEDICINE

## 2023-05-08 RX ORDER — TRIAMCINOLONE ACETONIDE 40 MG/ML
40 INJECTION, SUSPENSION INTRA-ARTICULAR; INTRAMUSCULAR
Status: SHIPPED | OUTPATIENT
Start: 2023-05-08

## 2023-05-08 RX ADMIN — TRIAMCINOLONE ACETONIDE 40 MG: 40 INJECTION, SUSPENSION INTRA-ARTICULAR; INTRAMUSCULAR at 08:32

## 2023-05-08 NOTE — LETTER
5/8/2023         RE: Ignacio Poole  [address] [facility] [address]        Dear Colleague,    Thank you for referring your patient, Ignacio Poole, to the [facility] [place]. Please see a copy of my visit note below.    HISTORY OF PRESENT ILLNESS  Mr. Poole is a pleasant 47 year old year old male who presents to clinic today with the following:  What problem are you here for? Left knee pain  Not resolved  Still has pain with kneeling  How long have you had this problem? 4 months    Have you had any recent imaging of this problem? Xrays/MRI/CT scans? yes    Have you had treatments for this problem in the past?  -Medications? yes  -Physical therapy? Yes at home exercises  -Injections? no  -Surgery? no    How severe is this problem today? 0-10 scale? 1    How severe has this problem been at WORST in the past? 0-10 scale? 8    What do you think caused this problem? unsure    Does this problem or its symptoms cause difficulty for you falling asleep or staying asleep? no    Anything else you want us to know about this problem? no          MEDICAL HISTORY  Patient Active Problem List   Diagnosis     CARDIOVASCULAR SCREENING; LDL GOAL LESS THAN 160     Insomnia     Eczema     IGT (impaired glucose tolerance)     High triglycerides     HDL deficiency     10 year risk of MI or stroke < 7.5%, 1.1% in April 2017     Other migraine without status migrainosus, not intractable       Current Outpatient Medications   Medication Sig Dispense Refill     albuterol (PROAIR HFA/PROVENTIL HFA/VENTOLIN HFA) 108 (90 Base) MCG/ACT inhaler Take 2 puffs 15 minute(s) before exercise 18 g 1     butalbital-acetaminophen-caffeine (ESGIC) -40 MG tablet TAKE 1 TABLET BY MOUTH EVERY 4 HOURS AS NEEDED 30 tablet 1     meloxicam (MOBIC) 15 MG tablet Take 1 tablet (15 mg) by mouth daily 30 tablet 0     meloxicam (MOBIC) 15 MG tablet Take 1 tablet (15 mg) by mouth daily 30 tablet 0      traZODone (DESYREL) 50 MG tablet Take 1 tablet (50 mg) by mouth nightly as needed for sleep 90 tablet 1     venlafaxine (EFFEXOR XR) 75 MG 24 hr capsule TAKE 1 CAPSULE BY MOUTH EVERY MORNING 90 capsule 2       No Known Allergies    Family History   Problem Relation Age of Onset     Breast Cancer Mother      Gastrointestinal Disease Father         crohns     Asthma Father      Genetic Disorder Father         crohns disease     Prostate Cancer Paternal Grandfather      Diabetes Paternal Grandfather      Gastrointestinal Disease Sister         crohns     Asthma Sister      Genetic Disorder Sister         crohns disease     Social History     Socioeconomic History     Marital status:    Tobacco Use     Smoking status: Never     Smokeless tobacco: Never   Vaping Use     Vaping status: Never Used   Substance and Sexual Activity     Alcohol use: No     Drug use: No     Sexual activity: Yes     Partners: Female     Birth control/protection: Abstinence, Male Surgical     Comment: interested in male surgical   Other Topics Concern     Parent/sibling w/ CABG, MI or angioplasty before 65F 55M? No       Additional medical/Social/Surgical histories reviewed in [facility] and updated as appropriate.     REVIEW OF SYSTEMS (5/8/2023)  10 point ROS of systems including Constitutional, Eyes, Respiratory, Cardiovascular, Gastroenterology, Genitourinary, Integumentary, Musculoskeletal, Psychiatric, Allergic/Immunologic were all negative except for pertinent positives noted in my HPI.     PHYSICAL EXAM  VSS    General  - normal appearance, in no obvious distress  HEENT  - conjunctivae not injected, moist mucous membranes, normocephalic/atraumatic head, ears normal appearance, no lesions, mouth normal appearance, no scars, normal dentition and teeth present  CV  - normal popliteal pulse  Pulm  - normal respiratory pattern, non-labored  Musculoskeletal - left knee  - stance: normal gait without limp, no obvious leg length discrepancy  -  inspection: trace effusion, no ecchymosis, normal muscle tone, normal bone and joint alignment, no obvious deformity  - palpation: lateral joint line tenderness, patella and patellar tendon non-tender, normal popliteal pulse  - ROM: 135 degrees flexion, -5 degrees extension, pain at terminal extension passively  - strength: 5/5 in flexion, 5/5 in extension  - neuro: no sensory or motor deficit  - special tests:  (-) Lachman  (-) anterior drawer  (-) posterior drawer  (-) pivot shift  (+) Hailey  (+) Thessaly  (-) varus at 0 and 30 degrees flexion  (-) valgus at 0 and 30 degrees flexion  (+) Babatunde s compression test  (-) patellar apprehension  Neuro  - no sensory or motor deficit, grossly normal coordination, normal muscle tone  Skin  - no ecchymosis, erythema, warmth, or induration, no obvious rash  Psych  - interactive, appropriate, normal mood and affect  ASSESSMENT & PLAN  48 yo male with left knee lateral meniscus tear, degenerative, patellofemoral pain arthritis, not resolved    I independently reviewed the following imaging studies:  Left knee xray: shows some arthritis changes  After a 20 minute discussion and examination, we decided to perform a same day injection for diagnostic and therapeutic purposes for left knee  Consider Ha injections  Cont. nsaids PRN  F/u 2-3 weeks  Patient has been doing home exercise physical therapy program for this problem      Appropriate PPE was utilized for prevention of spread of Covid-19.  Harris Vazquez MD, CAQSM  PROCEDURE:         left   Knee joint injection   The patient was apprised of the risks and the benefits of the procedure and consented and a written consent was signed.   The patient s  KNEE was sterilely prepped with chloraprep.   40 mg of triamcinolone suspension was drawn up into a 5 mL syringe with 4 mL of 1% lidocaine  The patient was injected into the knee with a 1.5-inch 22-gauge needle at the_superiorlateral aspect of their knee joint  There were no  complications. The patient tolerated the procedure well. There was minimal bleeding.   The patient was instructed to ice the knee upon leaving clinic and refrain from overuse over the next 3 days.   The patient was instructed to go to the emergency room with any usual pain, swelling, or redness occurred in the injected area.   The patient was given a followup appointment to evaluate response to the injection to their increased range of motion and reduction of pain.  Follow up PRN  Dr Harris Vazquez    Large Joint Injection/Arthocentesis: L knee joint    Date/Time: 5/8/2023 8:32 AM    Performed by: Harris Vazquez MD  Authorized by: Harris Vazquez MD    Indications:  Pain, osteoarthritis and diagnostic evaluation  Needle Size:  22 G  Guidance: landmark guided    Approach:  Superolateral  Location:  Knee      Medications:  40 mg triamcinolone 40 MG/ML  Outcome:  Tolerated well, no immediate complications  Procedure discussed: discussed risks, benefits, and alternatives    Consent Given by:  Patient  Timeout: timeout called immediately prior to procedure    Prep: patient was prepped and draped in usual sterile fashion            Again, thank you for allowing me to participate in the care of your patient.        Sincerely,        Harris Vazquez MD

## 2023-05-08 NOTE — PROGRESS NOTES
HISTORY OF PRESENT ILLNESS  Mr. Poole is a pleasant 47 year old year old male who presents to clinic today with the following:  What problem are you here for? Left knee pain  Not resolved  Still has pain with kneeling  How long have you had this problem? 4 months    Have you had any recent imaging of this problem? Xrays/MRI/CT scans? yes    Have you had treatments for this problem in the past?  -Medications? yes  -Physical therapy? Yes at home exercises  -Injections? no  -Surgery? no    How severe is this problem today? 0-10 scale? 1    How severe has this problem been at WORST in the past? 0-10 scale? 8    What do you think caused this problem? unsure    Does this problem or its symptoms cause difficulty for you falling asleep or staying asleep? no    Anything else you want us to know about this problem? no          MEDICAL HISTORY  Patient Active Problem List   Diagnosis     CARDIOVASCULAR SCREENING; LDL GOAL LESS THAN 160     Insomnia     Eczema     IGT (impaired glucose tolerance)     High triglycerides     HDL deficiency     10 year risk of MI or stroke < 7.5%, 1.1% in April 2017     Other migraine without status migrainosus, not intractable       Current Outpatient Medications   Medication Sig Dispense Refill     albuterol (PROAIR HFA/PROVENTIL HFA/VENTOLIN HFA) 108 (90 Base) MCG/ACT inhaler Take 2 puffs 15 minute(s) before exercise 18 g 1     butalbital-acetaminophen-caffeine (ESGIC) -40 MG tablet TAKE 1 TABLET BY MOUTH EVERY 4 HOURS AS NEEDED 30 tablet 1     meloxicam (MOBIC) 15 MG tablet Take 1 tablet (15 mg) by mouth daily 30 tablet 0     meloxicam (MOBIC) 15 MG tablet Take 1 tablet (15 mg) by mouth daily 30 tablet 0     traZODone (DESYREL) 50 MG tablet Take 1 tablet (50 mg) by mouth nightly as needed for sleep 90 tablet 1     venlafaxine (EFFEXOR XR) 75 MG 24 hr capsule TAKE 1 CAPSULE BY MOUTH EVERY MORNING 90 capsule 2       No Known Allergies    Family History   Problem Relation Age of Onset      Breast Cancer Mother      Gastrointestinal Disease Father         crohns     Asthma Father      Genetic Disorder Father         crohns disease     Prostate Cancer Paternal Grandfather      Diabetes Paternal Grandfather      Gastrointestinal Disease Sister         crohns     Asthma Sister      Genetic Disorder Sister         crohns disease     Social History     Socioeconomic History     Marital status:    Tobacco Use     Smoking status: Never     Smokeless tobacco: Never   Vaping Use     Vaping status: Never Used   Substance and Sexual Activity     Alcohol use: No     Drug use: No     Sexual activity: Yes     Partners: Female     Birth control/protection: Abstinence, Male Surgical     Comment: interested in male surgical   Other Topics Concern     Parent/sibling w/ CABG, MI or angioplasty before 65F 55M? No       Additional medical/Social/Surgical histories reviewed in [facility] and updated as appropriate.     REVIEW OF SYSTEMS (5/8/2023)  10 point ROS of systems including Constitutional, Eyes, Respiratory, Cardiovascular, Gastroenterology, Genitourinary, Integumentary, Musculoskeletal, Psychiatric, Allergic/Immunologic were all negative except for pertinent positives noted in my HPI.     PHYSICAL EXAM  VSS    General  - normal appearance, in no obvious distress  HEENT  - conjunctivae not injected, moist mucous membranes, normocephalic/atraumatic head, ears normal appearance, no lesions, mouth normal appearance, no scars, normal dentition and teeth present  CV  - normal popliteal pulse  Pulm  - normal respiratory pattern, non-labored  Musculoskeletal - left knee  - stance: normal gait without limp, no obvious leg length discrepancy  - inspection: trace effusion, no ecchymosis, normal muscle tone, normal bone and joint alignment, no obvious deformity  - palpation: lateral joint line tenderness, patella and patellar tendon non-tender, normal popliteal pulse  - ROM: 135 degrees flexion, -5 degrees extension, pain at  terminal extension passively  - strength: 5/5 in flexion, 5/5 in extension  - neuro: no sensory or motor deficit  - special tests:  (-) Lachman  (-) anterior drawer  (-) posterior drawer  (-) pivot shift  (+) Hailey  (+) Thessaly  (-) varus at 0 and 30 degrees flexion  (-) valgus at 0 and 30 degrees flexion  (+) Babatunde s compression test  (-) patellar apprehension  Neuro  - no sensory or motor deficit, grossly normal coordination, normal muscle tone  Skin  - no ecchymosis, erythema, warmth, or induration, no obvious rash  Psych  - interactive, appropriate, normal mood and affect  ASSESSMENT & PLAN  48 yo male with left knee lateral meniscus tear, degenerative, patellofemoral pain arthritis, not resolved    I independently reviewed the following imaging studies:  Left knee xray: shows some arthritis changes  After a 20 minute discussion and examination, we decided to perform a same day injection for diagnostic and therapeutic purposes for left knee  Consider Ha injections  Cont. nsaids PRN  F/u 2-3 weeks  Patient has been doing home exercise physical therapy program for this problem      Appropriate PPE was utilized for prevention of spread of Covid-19.  Harris Vazquez MD, CAQSM  PROCEDURE:         left   Knee joint injection   The patient was apprised of the risks and the benefits of the procedure and consented and a written consent was signed.   The patient s  KNEE was sterilely prepped with chloraprep.   40 mg of triamcinolone suspension was drawn up into a 5 mL syringe with 4 mL of 1% lidocaine  The patient was injected into the knee with a 1.5-inch 22-gauge needle at the_superiorlateral aspect of their knee joint  There were no complications. The patient tolerated the procedure well. There was minimal bleeding.   The patient was instructed to ice the knee upon leaving clinic and refrain from overuse over the next 3 days.   The patient was instructed to go to the emergency room with any usual pain, swelling, or  redness occurred in the injected area.   The patient was given a followup appointment to evaluate response to the injection to their increased range of motion and reduction of pain.  Follow up PRN  Dr Harris Vazquez    Large Joint Injection/Arthocentesis: L knee joint    Date/Time: 5/8/2023 8:32 AM    Performed by: Harris Vazquez MD  Authorized by: Harris Vazquez MD    Indications:  Pain, osteoarthritis and diagnostic evaluation  Needle Size:  22 G  Guidance: landmark guided    Approach:  Superolateral  Location:  Knee      Medications:  40 mg triamcinolone 40 MG/ML  Outcome:  Tolerated well, no immediate complications  Procedure discussed: discussed risks, benefits, and alternatives    Consent Given by:  Patient  Timeout: timeout called immediately prior to procedure    Prep: patient was prepped and draped in usual sterile fashion

## 2023-05-22 ENCOUNTER — PATIENT OUTREACH (OUTPATIENT)
Dept: CARE COORDINATION | Facility: CLINIC | Age: 48
End: 2023-05-22
Payer: COMMERCIAL

## 2023-05-23 ENCOUNTER — VIRTUAL VISIT (OUTPATIENT)
Dept: ORTHOPEDICS | Facility: CLINIC | Age: 48
End: 2023-05-23
Payer: COMMERCIAL

## 2023-05-23 DIAGNOSIS — M17.11 ARTHRITIS OF RIGHT KNEE: Primary | ICD-10-CM

## 2023-05-23 DIAGNOSIS — M70.51 FIBULAR COLLATERAL LIGAMENT BURSITIS, RIGHT: ICD-10-CM

## 2023-05-23 PROCEDURE — 99442 PR PHYSICIAN TELEPHONE EVALUATION 11-20 MIN: CPT | Performed by: PREVENTIVE MEDICINE

## 2023-05-23 NOTE — LETTER
5/23/2023         RE: Ignacio Poole  [address] [address]        Dear Colleague,    Thank you for referring your patient, Ignacio Poole, to the [facility] [place]. Please see a copy of my visit note below.    Patient is a  47   year old who is being evaluated via a billable telephone visit.      What phone number would you like to be contacted at? CELL  How would you like to obtain your AVS? MYCHART        Subjective   Patient is a  47   year old who presents by phone call visit for the following:     HPI   F/u for right knee pain  Had injection last visit doing much better  Greater than 80% improvement      Review of Systems   Constitutional, HEENT, cardiovascular, pulmonary, gi and gu systems are negative, except as otherwise noted.      Objective           Vitals:  No vitals were obtained today due to virtual visit.    Physical Exam   healthy, alert and no distress  PSYCH: Alert and oriented times 3; coherent speech, normal   rate and volume, able to articulate logical thoughts, able   to abstract reason, no tangential thoughts, no hallucinations   or delusions  His affect is normal  RESP: No cough, no audible wheezing, able to talk in full sentences  Remainder of exam unable to be completed due to telephone visits    Assessment/Plan  46 yo male with left knee pain due to arthritis, improved    I independently reviewed the following imaging studies and discussed with patient:  Left knee xray: shows arthritis  Consider MRI knee if worsens again  Cont. Home PT program  Consider more PT  Cont. Topical nsaids PRN  F/u 1-2 months        Phone call duration: 20 minutes  Phone call start: 900am  Phone call end: 920am  Dr Vazquez      Again, thank you for allowing me to participate in the care of your patient.        Sincerely,        Harris Vazquez MD

## 2023-05-23 NOTE — LETTER
5/23/2023         RE: Ignacio Poole  [address] [address]        Dear Colleague,    Thank you for referring your patient, Ignacio Poole, to the [facility] [place]. Please see a copy of my visit note below.    Patient is a  47   year old who is being evaluated via a billable telephone visit.      What phone number would you like to be contacted at? CELL  How would you like to obtain your AVS? MYCHART        Subjective   Patient is a  47   year old who presents by phone call visit for the following:     HPI   F/u for right knee pain  Had injection last visit doing much better  Greater than 80% improvement      Review of Systems   Constitutional, HEENT, cardiovascular, pulmonary, gi and gu systems are negative, except as otherwise noted.      Objective           Vitals:  No vitals were obtained today due to virtual visit.    Physical Exam   healthy, alert and no distress  PSYCH: Alert and oriented times 3; coherent speech, normal   rate and volume, able to articulate logical thoughts, able   to abstract reason, no tangential thoughts, no hallucinations   or delusions  His affect is normal  RESP: No cough, no audible wheezing, able to talk in full sentences  Remainder of exam unable to be completed due to telephone visits    Assessment/Plan  48 yo male with left knee pain due to arthritis, improved    I independently reviewed the following imaging studies and discussed with patient:  Left knee xray: shows arthritis  Consider MRI knee if worsens again  Cont. Home PT program  Consider more PT  Cont. Topical nsaids PRN  F/u 1-2 months        Phone call duration: 20 minutes  Phone call start: 900am  Phone call end: 920am  Dr Vazquez      Again, thank you for allowing me to participate in the care of your patient.        Sincerely,        Harris Vazquez MD

## 2023-05-23 NOTE — PROGRESS NOTES
Patient is a  47   year old who is being evaluated via a billable telephone visit.      What phone number would you like to be contacted at? CELL  How would you like to obtain your AVS? CARY        Subjective   Patient is a  47   year old who presents by phone call visit for the following:     HPI   F/u for right knee pain  Had injection last visit doing much better  Greater than 80% improvement      Review of Systems   Constitutional, HEENT, cardiovascular, pulmonary, gi and gu systems are negative, except as otherwise noted.      Objective           Vitals:  No vitals were obtained today due to virtual visit.    Physical Exam   healthy, alert and no distress  PSYCH: Alert and oriented times 3; coherent speech, normal   rate and volume, able to articulate logical thoughts, able   to abstract reason, no tangential thoughts, no hallucinations   or delusions  His affect is normal  RESP: No cough, no audible wheezing, able to talk in full sentences  Remainder of exam unable to be completed due to telephone visits    Assessment/Plan  48 yo male with left knee pain due to arthritis, improved    I independently reviewed the following imaging studies and discussed with patient:  Left knee xray: shows arthritis  Consider MRI knee if worsens again  Cont. Home PT program  Consider more PT  Cont. Topical nsaids PRN  F/u 1-2 months        Phone call duration: 20 minutes  Phone call start: 900am  Phone call end: 920am  Dr Vazquez   Quality 111:Pneumonia Vaccination Status For Older Adults: Pneumococcal Vaccination Previously Received Quality 130: Documentation Of Current Medications In The Medical Record: Current Medications Documented Detail Level: Detailed

## 2023-05-25 ENCOUNTER — TELEPHONE (OUTPATIENT)
Dept: FAMILY MEDICINE | Facility: CLINIC | Age: 48
End: 2023-05-25
Payer: COMMERCIAL

## 2023-05-25 NOTE — TELEPHONE ENCOUNTER
Patient Quality Outreach    Patient is due for the following:   Colon Cancer Screening    Next Steps:   Schedule a office visit for colon cancer screen    Type of outreach:    Sent Get [employer] message.      Questions for provider review:    None       Betzy Mueller

## 2023-06-13 ENCOUNTER — VIRTUAL VISIT (OUTPATIENT)
Dept: ORTHOPEDICS | Facility: CLINIC | Age: 48
End: 2023-06-13
Payer: COMMERCIAL

## 2023-06-13 DIAGNOSIS — M17.12 ARTHRITIS OF LEFT KNEE: Primary | ICD-10-CM

## 2023-06-13 PROCEDURE — 99442 PR PHYSICIAN TELEPHONE EVALUATION 11-20 MIN: CPT | Performed by: PREVENTIVE MEDICINE

## 2023-06-13 NOTE — PROGRESS NOTES
Patient is a   47  year old who is being evaluated via a billable telephone visit.      What phone number would you like to be contacted at? CELL  How would you like to obtain your AVS? CARY        Subjective   Patient is a  47   year old who presents by phone call visit for the following:     HPI   Follow-up for left knee pain and arthritis  Doing well over 80% better still with cortisone injection he had last month and not using nsaids currently  Doing Home Physical therapy exercises consistently each week which are also helping    Review of Systems   Constitutional, HEENT, cardiovascular, pulmonary, gi and gu systems are negative, except as otherwise noted.      Objective           Vitals:  No vitals were obtained today due to virtual visit.    Physical Exam   healthy, alert and no distress  PSYCH: Alert and oriented times 3; coherent speech, normal   rate and volume, able to articulate logical thoughts, able   to abstract reason, no tangential thoughts, no hallucinations   or delusions  His affect is normal  RESP: No cough, no audible wheezing, able to talk in full sentences  Remainder of exam unable to be completed due to telephone visits    Assessment/Plan  48 yo male with right knee with patellofemoral     I independently reviewed the following imaging studies and discussed with patient:  Left knee xray: shows some PF arthritis  Discussed continue PT home exercises  nsaids PRN  F/u PRN  Can consider PT again or further injections if worsens          Phone call duration: 20 minutes  Phone call start: 1100am  Phone call end: 1120am  Dr Vazquez

## 2023-06-13 NOTE — LETTER
6/13/2023         RE: Ignacio Poole  [address] [address]        Dear Colleague,    Thank you for referring your patient, Ignacio Poole, to the [facility] [place]. Please see a copy of my visit note below.    Patient is a   47  year old who is being evaluated via a billable telephone visit.      What phone number would you like to be contacted at? CELL  How would you like to obtain your AVS? MYCHART        Subjective   Patient is a  47   year old who presents by phone call visit for the following:     HPI   Follow-up for left knee pain and arthritis  Doing well over 80% better still with cortisone injection he had last month and not using nsaids currently  Doing Home Physical therapy exercises consistently each week which are also helping    Review of Systems   Constitutional, HEENT, cardiovascular, pulmonary, gi and gu systems are negative, except as otherwise noted.      Objective           Vitals:  No vitals were obtained today due to virtual visit.    Physical Exam   healthy, alert and no distress  PSYCH: Alert and oriented times 3; coherent speech, normal   rate and volume, able to articulate logical thoughts, able   to abstract reason, no tangential thoughts, no hallucinations   or delusions  His affect is normal  RESP: No cough, no audible wheezing, able to talk in full sentences  Remainder of exam unable to be completed due to telephone visits    Assessment/Plan  46 yo male with right knee with patellofemoral     I independently reviewed the following imaging studies and discussed with patient:  Left knee xray: shows some PF arthritis  Discussed continue PT home exercises  nsaids PRN  F/u PRN  Can consider PT again or further injections if worsens          Phone call duration: 20 minutes  Phone call start: 1100am  Phone call end: 1120am  Dr Vazquez      Again, thank you for allowing me to participate in the care of your patient.         Sincerely,        Harris Vazquez MD

## 2023-06-13 NOTE — LETTER
6/13/2023         RE: Ignacio Poole  [address] [address]        Dear Colleague,    Thank you for referring your patient, Ignacio Poole, to the [facility] [place]. Please see a copy of my visit note below.    Patient is a   47  year old who is being evaluated via a billable telephone visit.      What phone number would you like to be contacted at? CELL  How would you like to obtain your AVS? MYCHART        Subjective   Patient is a  47   year old who presents by phone call visit for the following:     HPI   Follow-up for left knee pain and arthritis  Doing well over 80% better still with cortisone injection he had last month and not using nsaids currently  Doing Home Physical therapy exercises consistently each week which are also helping    Review of Systems   Constitutional, HEENT, cardiovascular, pulmonary, gi and gu systems are negative, except as otherwise noted.      Objective           Vitals:  No vitals were obtained today due to virtual visit.    Physical Exam   healthy, alert and no distress  PSYCH: Alert and oriented times 3; coherent speech, normal   rate and volume, able to articulate logical thoughts, able   to abstract reason, no tangential thoughts, no hallucinations   or delusions  His affect is normal  RESP: No cough, no audible wheezing, able to talk in full sentences  Remainder of exam unable to be completed due to telephone visits    Assessment/Plan  48 yo male with right knee with patellofemoral     I independently reviewed the following imaging studies and discussed with patient:  Left knee xray: shows some PF arthritis  Discussed continue PT home exercises  nsaids PRN  F/u PRN  Can consider PT again or further injections if worsens          Phone call duration: 20 minutes  Phone call start: 1100am  Phone call end: 1120am  Dr Vazquez      Again, thank you for allowing me to participate in the care of your patient.         Sincerely,        Harris Vazquez MD

## 2023-06-15 ENCOUNTER — OFFICE VISIT (OUTPATIENT)
Dept: FAMILY MEDICINE | Facility: CLINIC | Age: 48
End: 2023-06-15
Payer: COMMERCIAL

## 2023-06-15 VITALS
OXYGEN SATURATION: 96 % | TEMPERATURE: 98 F | DIASTOLIC BLOOD PRESSURE: 83 MMHG | WEIGHT: 209 LBS | HEART RATE: 95 BPM | SYSTOLIC BLOOD PRESSURE: 123 MMHG | BODY MASS INDEX: 29.92 KG/M2 | HEIGHT: 70 IN | RESPIRATION RATE: 18 BRPM

## 2023-06-15 DIAGNOSIS — H60.391 INFECTIVE OTITIS EXTERNA, RIGHT: Primary | ICD-10-CM

## 2023-06-15 DIAGNOSIS — H65.91 RIGHT SEROUS OTITIS MEDIA, UNSPECIFIED CHRONICITY: ICD-10-CM

## 2023-06-15 PROCEDURE — 99213 OFFICE O/P EST LOW 20 MIN: CPT | Performed by: PHYSICIAN ASSISTANT

## 2023-06-15 RX ORDER — NEOMYCIN SULFATE, POLYMYXIN B SULFATE AND HYDROCORTISONE 10; 3.5; 1 MG/ML; MG/ML; [USP'U]/ML
3 SUSPENSION/ DROPS AURICULAR (OTIC) 3 TIMES DAILY
Qty: 4 ML | Refills: 0 | Status: SHIPPED | OUTPATIENT
Start: 2023-06-15 | End: 2023-06-22

## 2023-06-15 ASSESSMENT — ASTHMA QUESTIONNAIRES
QUESTION_4 LAST FOUR WEEKS HOW OFTEN HAVE YOU USED YOUR RESCUE INHALER OR NEBULIZER MEDICATION (SUCH AS ALBUTEROL): NOT AT ALL
ACT_TOTALSCORE: 24
ACT_TOTALSCORE: 24
QUESTION_5 LAST FOUR WEEKS HOW WOULD YOU RATE YOUR ASTHMA CONTROL: WELL CONTROLLED
QUESTION_3 LAST FOUR WEEKS HOW OFTEN DID YOUR ASTHMA SYMPTOMS (WHEEZING, COUGHING, SHORTNESS OF BREATH, CHEST TIGHTNESS OR PAIN) WAKE YOU UP AT NIGHT OR EARLIER THAN USUAL IN THE MORNING: NOT AT ALL
QUESTION_2 LAST FOUR WEEKS HOW OFTEN HAVE YOU HAD SHORTNESS OF BREATH: NOT AT ALL
QUESTION_1 LAST FOUR WEEKS HOW MUCH OF THE TIME DID YOUR ASTHMA KEEP YOU FROM GETTING AS MUCH DONE AT WORK, SCHOOL OR AT HOME: NONE OF THE TIME

## 2023-06-15 ASSESSMENT — PAIN SCALES - GENERAL: PAINLEVEL: MODERATE PAIN (4)

## 2023-06-15 NOTE — PROGRESS NOTES
"  Assessment & Plan       ICD-10-CM    1. Infective otitis externa, right  H60.391 neomycin-polymyxin-hydrocortisone (CORTISPORIN) 3.5-20819-5 otic suspension      2. Right serous otitis media, unspecified chronicity  H65.91       Warning signs discussed.  side effects discussed  Symptomatic treatment: such as fluids,  OTC acetaminophen and /or non-steroidal anti-inflammatory medication.  Avoid q-tips.    FERNANDO [facility] SEVERO Lopez is a 47 year old, presenting for the following health issues:  Ear Problem        6/15/2023     9:57 AM   Additional Questions   Roomed by Lorrie   Accompanied by self         6/15/2023     9:57 AM   Patient Reported Additional Medications   Patient reports taking the following new medications none     History of Present Illness       Reason for visit:  Ear pain    He eats 4 or more servings of fruits and vegetables daily.He consumes 0 sweetened beverage(s) daily.He exercises with enough effort to increase his heart rate 60 or more minutes per day.  He exercises with enough effort to increase his heart rate 7 days per week.   He is taking medications regularly.  Patient is here today for evaluation of his right ear pain.  He is an avid Q-tip user.  He felt like he had some in his ear he was using a Q-tip to try to get out.  He has noticed increased pain and drainage along with a plugged sensation in his right ear.  He denies any recent colds coughs fevers or allergies.      Review of Systems   Constitutional, HEENT, cardiovascular, pulmonary, gi and gu systems are negative, except as otherwise noted.      Objective    /83   Pulse 95   Temp 98  F (36.7  C) (Tympanic)   Resp 18   Ht 1.778 m (5' 10\")   Wt 94.8 kg (209 lb)   SpO2 96%   BMI 29.99 kg/m    Body mass index is 29.99 kg/m .  Physical Exam   GENERAL: healthy, alert and no distress  EYES: Eyes grossly normal to inspection, PERRL and conjunctivae and sclerae " normal  HENT: Left ear canal is normal TM was without effusion or signs of infection.  Right ear canal is erythematous and slightly swollen.  He does have clear fluid behind TM.  NECK: no adenopathy, no asymmetry, masses, or scars and thyroid normal to palpation  RESP: lungs clear to auscultation - no rales, rhonchi or wheezes  CV: regular rate and rhythm, normal S1 S2, no S3 or S4, no murmur, click or rub, no peripheral edema and peripheral pulses strong  MS: no gross musculoskeletal defects noted, no edema

## 2023-08-19 ENCOUNTER — HEALTH MAINTENANCE LETTER (OUTPATIENT)
Age: 48
End: 2023-08-19

## 2023-09-08 ENCOUNTER — MYC MEDICAL ADVICE (OUTPATIENT)
Dept: FAMILY MEDICINE | Facility: CLINIC | Age: 48
End: 2023-09-08

## 2023-09-08 ENCOUNTER — TELEPHONE (OUTPATIENT)
Dept: FAMILY MEDICINE | Facility: CLINIC | Age: 48
End: 2023-09-08
Payer: COMMERCIAL

## 2023-09-08 NOTE — TELEPHONE ENCOUNTER
Patient Quality Outreach    Patient is due for the following:   Colon Cancer Screening  Physical Preventive Adult Physical      Topic Date Due    Hepatitis B Vaccine (1 of 3 - 3-dose series) Never done    COVID-19 Vaccine (3 - Pfizer series) 07/06/2021    Diptheria Tetanus Pertussis (DTAP/TDAP/TD) Vaccine (2 - Td or Tdap) 02/22/2023    Flu Vaccine (1) 09/01/2023       Next Steps:   Schedule a office visit for colon cancer screen and Adult Preventative    Type of outreach:    Sent true[x] Media message.      Questions for provider review:    None           Betzy Mueller

## 2023-09-18 ENCOUNTER — MYC MEDICAL ADVICE (OUTPATIENT)
Dept: FAMILY MEDICINE | Facility: CLINIC | Age: 48
End: 2023-09-18
Payer: COMMERCIAL

## 2023-09-18 DIAGNOSIS — Z12.11 COLON CANCER SCREENING: Primary | ICD-10-CM

## 2023-09-28 DIAGNOSIS — G43.809 OTHER MIGRAINE WITHOUT STATUS MIGRAINOSUS, NOT INTRACTABLE: ICD-10-CM

## 2023-09-28 RX ORDER — BUTALBITAL, ACETAMINOPHEN AND CAFFEINE 50; 325; 40 MG/1; MG/1; MG/1
1 TABLET ORAL EVERY 6 HOURS PRN
Qty: 20 TABLET | Refills: 0 | Status: SHIPPED | OUTPATIENT
Start: 2023-09-28 | End: 2023-11-09

## 2023-10-02 ENCOUNTER — OFFICE VISIT (OUTPATIENT)
Dept: DERMATOLOGY | Facility: CLINIC | Age: 48
End: 2023-10-02
Payer: COMMERCIAL

## 2023-10-02 DIAGNOSIS — L28.1 PRURIGO NODULARIS: Primary | ICD-10-CM

## 2023-10-02 DIAGNOSIS — L30.9 ECZEMA, UNSPECIFIED TYPE: ICD-10-CM

## 2023-10-02 PROCEDURE — 99203 OFFICE O/P NEW LOW 30 MIN: CPT | Mod: GC | Performed by: DERMATOLOGY

## 2023-10-02 RX ORDER — FLUOCINONIDE 0.5 MG/G
OINTMENT TOPICAL 2 TIMES DAILY
Qty: 30 G | Refills: 11 | Status: SHIPPED | OUTPATIENT
Start: 2023-10-02

## 2023-10-02 ASSESSMENT — PAIN SCALES - GENERAL: PAINLEVEL: NO PAIN (0)

## 2023-10-02 NOTE — PROGRESS NOTES
"[facility] Dermatology Note  Encounter Date: Oct 2, 2023  Office Visit     Dermatology Problem List:  #. Eczema, controlled  #. Prurigo Nodularis  - current tx: fluocinonide ointment  ____________________________________________    Assessment & Plan:     #. Prurigo Nodularis  Patient history of eczema new pruritic excoriated nodules on the legs, most consistent with prurigo nodularis. Discussed nature of disease and treatment options  - Moisturize: Recommend good OTC moisturizer to full body, such as Cera-Ve, Dede-cream, or Cetaphil. Advised best to apply after bathing to lock in moisture.  - current tx: fluocinonide ointment under occlusion    #. Eczema, controlled.   Patient has not had a flare in many years. Agreed to reach out if eczema ever returns. Treated successfully with betamethasone in the past.      Follow-up: 4 month(s) in-person, or earlier for new or changing lesions    Staff and Resident:       Paige Pal DO, MS  PGY-2  Dermatology  [facility]  Oct 2, 2023 12:43 PM    I, Usha Singh MD, saw this patient with the resident and agree with the resident s findings and plan of care as documented in the resident s note.    ____________________________________________    CC: Derm Problem (Area of concern on R lower leg, raised, red crusty bumps. One has been there 2+ years, are itchy, sometimes get infected when manipulated. Used \"hydrogel\" that helped but they never went away.)    HPI:  Mr. Ignacio Poole is a(n) 48 year old male who presents today as a new patient for multiple recurring lesions.     For about 3 years, having itchy rash on legs, mostly itchy at night, scabs over and bleeds when scratched. He thought it initially came from a bug bite but never healed and spread to both legs. He camps often with his family and does not believe the other lesions are bug bites. He has multiple areas that itch frequently.     Grew out of eczema and no flares. "   Patient is otherwise feeling well, without additional skin concerns.    Labs Reviewed:  N/A    Physical Exam:  Vitals: There were no vitals taken for this visit.  SKIN: Focused examination of legs was performed.  -  multiple keratotic scaly pink nodules with erosions on the lateral right leg and a few on the anterior left leg. Under dermoscopy scale and pinpoint hemorrhages.   - No other lesions of concern on areas examined.     Medications:  Current Outpatient Medications   Medication    albuterol (PROAIR HFA/PROVENTIL HFA/VENTOLIN HFA) 108 (90 Base) MCG/ACT inhaler    butalbital-acetaminophen-caffeine (ESGIC) -40 MG tablet    fluocinonide (LIDEX) 0.05 % external ointment    traZODone (DESYREL) 50 MG tablet    venlafaxine (EFFEXOR XR) 75 MG 24 hr capsule    meloxicam (MOBIC) 15 MG tablet     Current Facility-Administered Medications   Medication    triamcinolone (KENALOG-40) injection 40 mg      Past Medical History:   Patient Active Problem List   Diagnosis    CARDIOVASCULAR SCREENING; LDL GOAL LESS THAN 160    Insomnia    Eczema    IGT (impaired glucose tolerance)    High triglycerides    HDL deficiency    10 year risk of MI or stroke < 7.5%, 1.1% in April 2017    Other migraine without status migrainosus, not intractable     Past Medical History:   Diagnosis Date    Migraine     Uncomplicated asthma youth    excersize induced, cold weather       CC Referred Self, MD  No address on file on close of this encounter.

## 2023-10-02 NOTE — LETTER
"10/2/2023       RE: Ignacio Poole  [address] [facility] [address]     Dear Colleague,    Thank you for referring your patient, Ignacio Poole, to the [facility]ERMATOLOG[facility]IC [place] at [facility]. Please see a copy of my visit note below.    [facility] Dermatology Note  Encounter Date: Oct 2, 2023  Office Visit     Dermatology Problem List:  #. Eczema, controlled  #. Prurigo Nodularis  - current tx: fluocinonide ointment  ____________________________________________    Assessment & Plan:     #. Prurigo Nodularis  Patient history of eczema new pruritic excoriated nodules on the legs, most consistent with prurigo nodularis. Discussed nature of disease and treatment options  - Moisturize: Recommend good OTC moisturizer to full body, such as Cera-Ve, Dede-cream, or Cetaphil. Advised best to apply after bathing to lock in moisture.  - current tx: fluocinonide ointment under occlusion    #. Eczema, controlled.   Patient has not had a flare in many years. Agreed to reach out if eczema ever returns. Treated successfully with betamethasone in the past.      Follow-up: 4 month(s) in-person, or earlier for new or changing lesions    Staff and Resident:       Paige Pal DO, MS  PGY-2  Dermatology  [facility]  Oct 2, 2023 12:43 PM    I, Usha Singh MD, saw this patient with the resident and agree with the resident s findings and plan of care as documented in the resident s note.    ____________________________________________    CC: Derm Problem (Area of concern on R lower leg, raised, red crusty bumps. One has been there 2+ years, are itchy, sometimes get infected when manipulated. Used \"hydrogel\" that helped but they never went away.)    HPI:  Mr. Ignacio Poole is a(n) 48 year old male who presents today as a new patient for multiple recurring lesions.     For about 3 years, having itchy rash on " legs, mostly itchy at night, scabs over and bleeds when scratched. He thought it initially came from a bug bite but never healed and spread to both legs. He camps often with his family and does not believe the other lesions are bug bites. He has multiple areas that itch frequently.     Grew out of eczema and no flares.   Patient is otherwise feeling well, without additional skin concerns.    Labs Reviewed:  N/A    Physical Exam:  Vitals: There were no vitals taken for this visit.  SKIN: Focused examination of legs was performed.  -  multiple keratotic scaly pink nodules with erosions on the lateral right leg and a few on the anterior left leg. Under dermoscopy scale and pinpoint hemorrhages.   - No other lesions of concern on areas examined.     Medications:  Current Outpatient Medications   Medication    albuterol (PROAIR HFA/PROVENTIL HFA/VENTOLIN HFA) 108 (90 Base) MCG/ACT inhaler    butalbital-acetaminophen-caffeine (ESGIC) -40 MG tablet    fluocinonide (LIDEX) 0.05 % external ointment    traZODone (DESYREL) 50 MG tablet    venlafaxine (EFFEXOR XR) 75 MG 24 hr capsule    meloxicam (MOBIC) 15 MG tablet     Current Facility-Administered Medications   Medication    triamcinolone (KENALOG-40) injection 40 mg      Past Medical History:   Patient Active Problem List   Diagnosis    CARDIOVASCULAR SCREENING; LDL GOAL LESS THAN 160    Insomnia    Eczema    IGT (impaired glucose tolerance)    High triglycerides    HDL deficiency    10 year risk of MI or stroke < 7.5%, 1.1% in April 2017    Other migraine without status migrainosus, not intractable     Past Medical History:   Diagnosis Date    Migraine     Uncomplicated asthma youth    excersize induced, cold weather       CC Referred Self, MD  No address on file on close of this encounter.

## 2023-10-02 NOTE — NURSING NOTE
"Dermatology Rooming Note    Ignacio Poole's goals for this visit include:   Chief Complaint   Patient presents with    Derm Problem     Area of concern on R lower leg, raised, red crusty bumps. One has been there 2+ years, are itchy, sometimes get infected when manipulated. Used \"hydrogel\" that helped but they never went away.     Kortney Virk, EMT    "

## 2023-10-02 NOTE — PATIENT INSTRUCTIONS
Fluocinonide ointment - under occlusion up to twice daily.     Follow-up in 16 weeks.      Gentle Skin Care Recommendations    Below is a list of products our providers recommend for gentle skin care:    Bathing     Limiting bathing to a couple of times per week is the best but daily bathing is okay if this is your routine. Shower or bath is fine. Tips for keeping your bathing as gentle as possible:   - limit the time in the shower to 15 minutes or less   - bathe using warm but not hot water   - do not take bubble baths  - consider limiting soap to only the 'dirty' areas, like the armipts and groin as much as possible to prevent stripping your skin in other areas of their natural moisture  - do not vigiorously scrub when cleansing  - avoid any soaps with microbeads  - after showering, pat your skin dry with a towel, do not scrub  -  Make sure you are applying a good moisturizer after bathing every time  - use gentle soaps that are free of colors and scents and are not too strong of cleansers.     Examples of gentle cleansers:   - Mild bar soaps: Vanicream bar soap, Neutragena glycerin bar, Dove sensitive skin (fragrance-free)   - Mild liquid soaps: Cetaphil liquid cleanser, Vanicream liquid cleanser, CeraVe liquid cleanser    Moisturizing     It is important to moisturize at least twice per day to the whole body. It is a good idea to moisturize immediately after bathing while the skin is still damp in order to 'lock-in' the moisture.     Moisturizers come in a variety of types some of which are greasier, heavier, or lighter. The heaviest moisturizers are ointments, which are greasy like vaseline. The next best are creams, which are usually white and thick but absorb into the skin a little better. The lightest - and thus are not recommended - are lotions which are typically thin and contain more ingredients which can be irritating to your skin.     Examples of moisturizers:   - ointments: vaseline, CeraVe healing  ointment, Vaniply, vegetable oil, coconut oil   - creams: CeraVe, Cetaphil restoraderm, Vanicream, Neutrogena Norwegian Formula Hand Cream   - lotions: not recommended to use      Laundry     Be sure to use laundry products that are free of dyes and fragrances -- it is important to note that many laundry products that claim to be fragrance-free actually are not free of these.     Do not use laundry softeners or dryer sheets.     Laundry Products include:  - 7th generation Natural Laundry Detergent Liquid, 7th generation Free and Clear Natural Laundry Detergent packs, 7th generation Free and Clear natural 4x Concentrated Laundry Detergent, ECOS hypoallergenic laundry detergent, free & clear, ERA Ultra Era Free Liquid Laundry Detergent.       Other Gentle Skin Recommendations    Do not use products such as powders, perfumes, or colognes on your skin  Avoid saunas and steam baths - these temperatures are too hot   Avoid tight or  scratchy  clothing such as wool  Always wash new clothing before wearing them for the first time  Sometimes a humidifier or vaporizer, used at night can help the dry skin - but remember to keep it clean to void mold growth.  Avoid applying essential oils to the skin or diffusing in the home.

## 2023-10-25 ENCOUNTER — MYC MEDICAL ADVICE (OUTPATIENT)
Dept: FAMILY MEDICINE | Facility: CLINIC | Age: 48
End: 2023-10-25
Payer: COMMERCIAL

## 2023-10-25 NOTE — TELEPHONE ENCOUNTER
I've only seen once a year ago. Can see any provider to discuss and possibly a video visit if he can get full report to clinic before viist. .

## 2023-11-02 NOTE — PROGRESS NOTES
"    Assessment & Plan     Cervical spine arthritis  Reviewed CT results of 10/14/23 ER visit:  CANAL/FORAMINA: Multilevel discogenic, uncovertebral, and facet degenerative changes. Multifocal foraminal stenosis, including severe on the left at C3-C4 with additional mild to moderate scattered narrowing. Mild spinal canal stenosis at C4-C5 and C5-C6. No high-grade spinal canal stenosis.      Discussed with patient activity level suggestion and given stretches to complete daily/ twice daily. To get massages for muscle tenderness as needed.  To hold on PT without acute pain. If progressing beyond discussed cervical injections by neurosurgery.  expected course of disease discussed with patient.    Other migraine without status migrainosus, not intractable  Ongoing   - butalbital-acetaminophen-caffeine (ESGIC) -40 MG tablet; Take 1 tablet by mouth every 6 hours as needed for migraine Need to be seen for more  Likely worsened by neck although caffeine withdrawal plays a role as well. Likely predominantly tension headaches he gets. Med works well, longstanding. Primary out ok to refill for him this time.   Due for preventive visit with primary to continue management     CLAUDINE (generalized anxiety disorder)  Stable.  - venlafaxine (EFFEXOR XR) 75 MG 24 hr capsule; TAKE 1 CAPSULE BY MOUTH EVERY MORNING  Doing well on them. Adheres to treatment most days.             BMI:   Estimated body mass index is 30.56 kg/m  as calculated from the following:    Height as of 6/15/23: 1.778 m (5' 10\").    Weight as of this encounter: 96.6 kg (213 lb).   Weight management plan: defer    Follow up with primary care     RUBY CLARK PA-C  [facility] SEVERO Lopez is a 48 year old, presenting for the following health issues:  CT Results            11/9/2023    10:36 AM   Additional Questions   Roomed by Chloe Corona MA   Accompanied by Self         History of Present Illness       Reason for visit:  Review of " CAT scan from ER visit to address stenosis in C3-C5 on left side.  Symptom onset:  More than a month  Symptoms include:  Migraines, left side neck stiffness and pain, muscles tight, neck cracking to relieve  Symptom intensity:  Severe  Symptom progression:  Worsening  Had these symptoms before:  Yes  Has tried/received treatment for these symptoms:  Yes  Previous treatment was successful:  No  What makes it worse:  Dehydration, bad pillow placement,  What makes it better:  Stretching, migraine meds, sleep, heating pads, massage area, ibuprophen, but symptoms seem to be coming up more and more often    He eats 2-3 servings of fruits and vegetables daily.He consumes 1 sweetened beverage(s) daily.He exercises with enough effort to increase his heart rate 10 to 19 minutes per day.  He exercises with enough effort to increase his heart rate 3 or less days per week.   He is taking medications regularly.           Review of Systems   Constitutional, HEENT, cardiovascular, pulmonary, GI, , musculoskeletal, neuro, skin, endocrine and psych systems are negative, except as otherwise noted.        Objective    /82   Pulse 85   Temp 97.6  F (36.4  C) (Tympanic)   Resp 16   Wt 96.6 kg (213 lb)   SpO2 99%   BMI 30.56 kg/m    Body mass index is 30.56 kg/m .        Physical Exam   GENERAL: healthy, alert and no distress  EYES: Eyes grossly normal to inspection, PERRL and conjunctivae and sclerae normal  NECK: no adenopathy, no asymmetry, masses, or scars and thyroid normal to palpation  RESP: lungs clear to auscultation - no rales, rhonchi or wheezes  CV: regular rate and rhythm, normal S1 S2, no S3 or S4, no murmur, click or rub, no peripheral edema and peripheral pulses strong  ABDOMEN: soft, nontender, no hepatosplenomegaly, no masses and bowel sounds normal  MS: no gross musculoskeletal defects noted, no edema. Tightness neck L sided without acute tenderness   NEURO: Normal strength and tone, mentation intact and  speech normal  PSYCH: mentation appears normal, affect normal/bright

## 2023-11-09 ENCOUNTER — OFFICE VISIT (OUTPATIENT)
Dept: FAMILY MEDICINE | Facility: CLINIC | Age: 48
End: 2023-11-09
Payer: COMMERCIAL

## 2023-11-09 VITALS
RESPIRATION RATE: 16 BRPM | OXYGEN SATURATION: 99 % | WEIGHT: 213 LBS | SYSTOLIC BLOOD PRESSURE: 138 MMHG | TEMPERATURE: 97.6 F | HEART RATE: 85 BPM | DIASTOLIC BLOOD PRESSURE: 82 MMHG | BODY MASS INDEX: 30.56 KG/M2

## 2023-11-09 DIAGNOSIS — F41.1 GAD (GENERALIZED ANXIETY DISORDER): ICD-10-CM

## 2023-11-09 DIAGNOSIS — M47.812 CERVICAL SPINE ARTHRITIS: Primary | ICD-10-CM

## 2023-11-09 DIAGNOSIS — G43.809 OTHER MIGRAINE WITHOUT STATUS MIGRAINOSUS, NOT INTRACTABLE: ICD-10-CM

## 2023-11-09 PROCEDURE — 99214 OFFICE O/P EST MOD 30 MIN: CPT | Performed by: PHYSICIAN ASSISTANT

## 2023-11-09 RX ORDER — BUTALBITAL, ACETAMINOPHEN AND CAFFEINE 50; 325; 40 MG/1; MG/1; MG/1
1 TABLET ORAL EVERY 6 HOURS PRN
Qty: 20 TABLET | Refills: 0 | Status: SHIPPED | OUTPATIENT
Start: 2023-11-09 | End: 2023-12-27

## 2023-11-09 RX ORDER — VENLAFAXINE HYDROCHLORIDE 75 MG/1
CAPSULE, EXTENDED RELEASE ORAL
Qty: 90 CAPSULE | Refills: 2 | Status: SHIPPED | OUTPATIENT
Start: 2023-11-09

## 2023-11-09 ASSESSMENT — PAIN SCALES - GENERAL: PAINLEVEL: NO PAIN (0)

## 2023-11-16 ENCOUNTER — TRANSFERRED RECORDS (OUTPATIENT)
Dept: HEALTH INFORMATION MANAGEMENT | Facility: CLINIC | Age: 48
End: 2023-11-16
Payer: COMMERCIAL

## 2023-12-26 ASSESSMENT — ASTHMA QUESTIONNAIRES: ACT_TOTALSCORE: 23

## 2023-12-27 ENCOUNTER — VIRTUAL VISIT (OUTPATIENT)
Dept: FAMILY MEDICINE | Facility: CLINIC | Age: 48
End: 2023-12-27
Payer: COMMERCIAL

## 2023-12-27 DIAGNOSIS — G43.809 OTHER MIGRAINE WITHOUT STATUS MIGRAINOSUS, NOT INTRACTABLE: ICD-10-CM

## 2023-12-27 PROCEDURE — 99213 OFFICE O/P EST LOW 20 MIN: CPT | Mod: VID | Performed by: PHYSICIAN ASSISTANT

## 2023-12-27 RX ORDER — BUTALBITAL, ACETAMINOPHEN AND CAFFEINE 50; 325; 40 MG/1; MG/1; MG/1
1 TABLET ORAL EVERY 6 HOURS PRN
Qty: 30 TABLET | Refills: 0 | Status: SHIPPED | OUTPATIENT
Start: 2023-12-27 | End: 2024-04-19

## 2023-12-27 ASSESSMENT — ENCOUNTER SYMPTOMS: HEADACHES: 1

## 2023-12-27 NOTE — PROGRESS NOTES
"    Instructions Relayed to Patient by Virtual Roomer:     Patient is active on [employer]:   Relayed following to patient: \"It looks like you are active on [employer], are you able to join the visit this way? If not, do you need us to send you a link now or would you like your provider to send a link via text or email when they are ready to initiate the visit?\"    Reminded patient to ensure they were logged on to virtual visit by arrival time listed. Documented in appointment notes if patient had flexibility to initiate visit sooner than arrival time. If pediatric virtual visit, ensured pediatric patient along with parent/guardian will be present for video visit.     Patient offered the website [email or web address] and/or phone number to [employer] Help line: [phone]    John is a 48 year old who is being evaluated via a billable video visit.      How would you like to obtain your AVS? [employer]  If the video visit is dropped, the invitation should be resent by: Text to cell phone: [phone]  Will anyone else be joining your video visit? No          Assessment & Plan     (G43.809) Other migraine without status migrainosus, not intractable  Comment: Patient with ongoing headaches.  Suspect these may be more tension headaches contributing to migraine headache development.  Discussed with patient short course refill, however, needs to follow-up with 1 provider for barbiturates for migraines moving forward further, discussed with patient ideally trying other medications for migraine prophylaxis.  He has not followed with neurology.  I suspect tension headache may largely contribute to current symptoms.  I advised the patient ideally would limit barbiturates for migraines and potential trial of other modalities.  Patient was in agreement with this.  Referral for neurology.  Patient will follow-up with primary care provider for annual physical and discussion of refills.  Plan: " butalbital-acetaminophen-caffeine (ESGIC)         -40 MG tablet, Adult Neurology          Referral          [facility]    Kassidy Lopez is a 48 year old, presenting for the following health issues:  No chief complaint on file.        12/27/2023     1:54 PM   Additional Questions   Roomed by candido   Accompanied by self       Headache     History of Present Illness       Reason for visit:  Med check for migraine pills.    He eats 2-3 servings of fruits and vegetables daily.He consumes 1 sweetened beverage(s) daily.He exercises with enough effort to increase his heart rate 9 or less minutes per day.  He exercises with enough effort to increase his heart rate 3 or less days per week.   He is taking medications regularly.     Migraine   Since your last clinic visit, how have your headaches changed?  No change  How often are you getting headaches or migraines? 2-3 a week or possibly months, depends   Are you able to do normal daily activities when you have a migraine? Yes - when catching it before it gets worse  Are you taking rescue/relief medications? (Select all that apply) Other: Esgic  How helpful is your rescue/relief medication?  I get total relief  Are you taking any medications to prevent migraines? (Select all that apply)  No  In the past 4 weeks, how often have you gone to urgent care or the emergency room because of your headaches?  0    Patient has been taking butalbital,  Tylenol, caffeine for migraine headaches.  Patient describes tension migraine onset with discomfort at the base of the skull rating towards the forehead.  Patient has been taking this for the last 4 to 5 years.  Does not have any recent flares of headache discomfort with neck injury.  Typical flares include caffeine withdrawal, bright lights, eyestrain amongst others.  Patient has never followed with neurology for this.  His headaches tend to be episodic with a few per week  And then none for a few weeks at a time.    No other new symptoms or features with headaches.    Review of Systems   Neurological:  Positive for headaches.            Objective           Vitals:  No vitals were obtained today due to virtual visit.    Physical Exam   GENERAL: Healthy, alert and no distress  EYES: Eyes grossly normal to inspection.  No discharge or erythema, or obvious scleral/conjunctival abnormalities.  RESP: No audible wheeze, cough, or visible cyanosis.  No visible retractions or increased work of breathing.    SKIN: Visible skin clear. No significant rash, abnormal pigmentation or lesions.  NEURO: Cranial nerves grossly intact.  Mentation and speech appropriate for age.  PSYCH: Mentation appears normal, affect normal/bright, judgement and insight intact, normal speech and appearance well-groomed.                Video-Visit Details    Type of service:  Video Visit     Originating Location (pt. Location): Home    Distant Location (provider location):  On-site  Platform used for Video Visit: [employer] Voiceboard

## 2024-03-22 ENCOUNTER — PATIENT OUTREACH (OUTPATIENT)
Dept: FAMILY MEDICINE | Facility: CLINIC | Age: 49
End: 2024-03-22
Payer: COMMERCIAL

## 2024-03-22 NOTE — TELEPHONE ENCOUNTER
Patient Quality Outreach    Patient is due for the following:   Asthma  -  ACT needed  Physical Preventive Adult Physical    Next Steps:   Schedule a Adult Preventative    Type of outreach:    Sent [employer] message.      Questions for provider review:    None           Ruchi Marie, CMA

## 2024-04-12 SDOH — HEALTH STABILITY: PHYSICAL HEALTH: ON AVERAGE, HOW MANY MINUTES DO YOU ENGAGE IN EXERCISE AT THIS LEVEL?: 30 MIN

## 2024-04-12 SDOH — HEALTH STABILITY: PHYSICAL HEALTH: ON AVERAGE, HOW MANY DAYS PER WEEK DO YOU ENGAGE IN MODERATE TO STRENUOUS EXERCISE (LIKE A BRISK WALK)?: 1 DAY

## 2024-04-12 ASSESSMENT — SOCIAL DETERMINANTS OF HEALTH (SDOH): HOW OFTEN DO YOU GET TOGETHER WITH FRIENDS OR RELATIVES?: ONCE A WEEK

## 2024-04-16 ENCOUNTER — MYC MEDICAL ADVICE (OUTPATIENT)
Dept: FAMILY MEDICINE | Facility: CLINIC | Age: 49
End: 2024-04-16
Payer: COMMERCIAL

## 2024-04-16 NOTE — TELEPHONE ENCOUNTER
See patient's list of things he wants to discuss at his appointment in 3 days.   Rectal bleeding was the only red flag symptom he is having.    Ok for patient to wait to discuss this with you?    Julisa NAIRN, RN

## 2024-04-17 NOTE — TELEPHONE ENCOUNTER
Take some probiotics over the counter x2 day x10 days and push fluids.  To ER or urgent care if worsening pain/ fevers or chills or fatigue/ LIGHT[employer]. Dominick Leyva MD

## 2024-04-19 ENCOUNTER — OFFICE VISIT (OUTPATIENT)
Dept: FAMILY MEDICINE | Facility: CLINIC | Age: 49
End: 2024-04-19
Payer: COMMERCIAL

## 2024-04-19 VITALS
SYSTOLIC BLOOD PRESSURE: 133 MMHG | TEMPERATURE: 98.9 F | DIASTOLIC BLOOD PRESSURE: 87 MMHG | HEIGHT: 69 IN | OXYGEN SATURATION: 97 % | RESPIRATION RATE: 16 BRPM | WEIGHT: 213.8 LBS | BODY MASS INDEX: 31.67 KG/M2 | HEART RATE: 82 BPM

## 2024-04-19 DIAGNOSIS — Z12.5 SCREENING PSA (PROSTATE SPECIFIC ANTIGEN): ICD-10-CM

## 2024-04-19 DIAGNOSIS — R03.0 ELEVATED BLOOD PRESSURE READING WITHOUT DIAGNOSIS OF HYPERTENSION: ICD-10-CM

## 2024-04-19 DIAGNOSIS — Z13.6 CARDIOVASCULAR SCREENING; LDL GOAL LESS THAN 160: ICD-10-CM

## 2024-04-19 DIAGNOSIS — J45.990 EXERCISE-INDUCED ASTHMA: ICD-10-CM

## 2024-04-19 DIAGNOSIS — F41.1 GAD (GENERALIZED ANXIETY DISORDER): ICD-10-CM

## 2024-04-19 DIAGNOSIS — G43.809 OTHER MIGRAINE WITHOUT STATUS MIGRAINOSUS, NOT INTRACTABLE: ICD-10-CM

## 2024-04-19 DIAGNOSIS — Z00.00 ROUTINE HISTORY AND PHYSICAL EXAMINATION OF ADULT: Primary | ICD-10-CM

## 2024-04-19 LAB
ERYTHROCYTE [DISTWIDTH] IN BLOOD BY AUTOMATED COUNT: 13.8 % (ref 10–15)
HCT VFR BLD AUTO: 46.7 % (ref 40–53)
HGB BLD-MCNC: 16 G/DL (ref 13.3–17.7)
MCH RBC QN AUTO: 28.8 PG (ref 26.5–33)
MCHC RBC AUTO-ENTMCNC: 34.3 G/DL (ref 31.5–36.5)
MCV RBC AUTO: 84 FL (ref 78–100)
PLATELET # BLD AUTO: 268 10E3/UL (ref 150–450)
RBC # BLD AUTO: 5.55 10E6/UL (ref 4.4–5.9)
WBC # BLD AUTO: 6.4 10E3/UL (ref 4–11)

## 2024-04-19 PROCEDURE — 36415 COLL VENOUS BLD VENIPUNCTURE: CPT | Performed by: FAMILY MEDICINE

## 2024-04-19 PROCEDURE — 80053 COMPREHEN METABOLIC PANEL: CPT | Performed by: FAMILY MEDICINE

## 2024-04-19 PROCEDURE — 90715 TDAP VACCINE 7 YRS/> IM: CPT | Performed by: FAMILY MEDICINE

## 2024-04-19 PROCEDURE — 99396 PREV VISIT EST AGE 40-64: CPT | Mod: 25 | Performed by: FAMILY MEDICINE

## 2024-04-19 PROCEDURE — 99214 OFFICE O/P EST MOD 30 MIN: CPT | Mod: 25 | Performed by: FAMILY MEDICINE

## 2024-04-19 PROCEDURE — 80061 LIPID PANEL: CPT | Performed by: FAMILY MEDICINE

## 2024-04-19 PROCEDURE — G0103 PSA SCREENING: HCPCS | Performed by: FAMILY MEDICINE

## 2024-04-19 PROCEDURE — 85027 COMPLETE CBC AUTOMATED: CPT | Performed by: FAMILY MEDICINE

## 2024-04-19 PROCEDURE — 90471 IMMUNIZATION ADMIN: CPT | Performed by: FAMILY MEDICINE

## 2024-04-19 RX ORDER — BUTALBITAL, ACETAMINOPHEN AND CAFFEINE 50; 325; 40 MG/1; MG/1; MG/1
1 TABLET ORAL EVERY 6 HOURS PRN
Qty: 30 TABLET | Refills: 2 | Status: SHIPPED | OUTPATIENT
Start: 2024-04-19

## 2024-04-19 RX ORDER — ALBUTEROL SULFATE 90 UG/1
AEROSOL, METERED RESPIRATORY (INHALATION)
Qty: 18 G | Refills: 1 | Status: SHIPPED | OUTPATIENT
Start: 2024-04-19

## 2024-04-19 ASSESSMENT — PAIN SCALES - GENERAL: PAINLEVEL: NO PAIN (0)

## 2024-04-19 NOTE — PROGRESS NOTES
Preventive Care Visit  [facility]  Dominick Leyva MD, Family Medicine  Apr 19, 2024        ASSESSMENT / PLAN:  (Z00.00) Routine history and physical examination of adult  (primary encounter diagnosis)  Comment: generally healthy and normal exam  Plan: Comprehensive metabolic panel, CBC with         platelets        Reviewed self mole/testicle check handout.  vitaminD. Increase exercise with some weight lighting too.  Consider sleep study if worse snoring or GI follow-up if more blood noted in stools. Slowly increase fiber (metamucil) in am with large glass of water and limit stooling time. Expected course and warning signs reviewed. Call/email with questions/concerns      (G43.779) Other migraine without status migrainosus, not intractable  Comment: stable  Plan: butalbital-acetaminophen-caffeine (ESGIC)         -40 MG tablet        Prn. Consider beta-blocker    (J45.990) Exercise-induced asthma  Comment: stable  Plan: albuterol (PROAIR HFA/PROVENTIL HFA/VENTOLIN         HFA) 108 (90 Base) MCG/ACT inhaler        Prn exercise    (F41.1) CLAUDINE (generalized anxiety disorder)  Comment: stable  Plan: continue effexor.     (Z12.5) Screening PSA (prostate specific antigen)  Plan: Prostate Specific Antigen Screen           (Z13.6) CARDIOVASCULAR SCREENING; LDL GOAL LESS THAN 160  Plan: Lipid Profile        Exerise and lower carb diet.    (R03.0) Elevated blood pressure reading without diagnosis of hypertension  Comment: possibly early htn vs anxiety  Plan: consider beta-blocker. Exercise and weight loss. Lower sodium. Self-monitor. Chest pain to er. No family history early cad and non-smoker.      Subjective   John is a 48 year old, presenting for the following:  Physical (Patient is fasting)  History anxiety, migraines, exercise-induced asthma and elevated blood pressure.  Had colonoscopy in fall - polyps repeat in 2026.   Chronic on/off blood with wiping. Water intake ok. No fiber supplements.  No nausea, vomiting or diarrhea or constipation. No urine changes or hematuria. Stream ok.   Grandpa with prostate cancer - elderly.   No family history mi/cva. Dad  crohns at 66yo . Mom doing ok. Sister with crohns.   No mole changes or hernia. S/p vasectomy.   No std concerns. Walking regularly.   vitaminD or milk.   . 4 kids 22,21,18,13.   Work going ok. Home. Some snoring - no brad noted.   Caffiene - one cup/day. No ALCOHOL.   No outside blood pressure readings.       2024     7:09 AM   Additional Questions   Roomed by THIERRY Mueller CMA        Health Care Directive  Patient does not have a Health Care Directive or Living Will: Discussed advance care planning with patient; information given to patient to review.    HPI              2024   General Health   How would you rate your overall physical health? Good   Feel stress (tense, anxious, or unable to sleep) To some extent   (!) STRESS CONCERN      2024   Nutrition   Three or more servings of calcium each day? Yes   Diet: Carbohydrate counting   How many servings of fruit and vegetables per day? (!) 0-1   How many sweetened beverages each day? 0-1         2024   Exercise   Days per week of moderate/strenous exercise 1 day   Average minutes spent exercising at this level 30 min   (!) EXERCISE CONCERN      2024   Social Factors   Frequency of gathering with friends or relatives Once a week   Worry food won't last until get money to buy more No   Food not last or not have enough money for food? No   Do you have housing?  Yes   Are you worried about losing your housing? No   Lack of transportation? No   Unable to get utilities (heat,electricity)? No         2024   Dental   Dentist two times every year? Yes         2024   TB Screening   Were you born outside of the [place]? No         Today's PHQ-2 Score:       2024     6:56 AM   PHQ-2 (  [employer])   Q1: Little interest or pleasure in doing things 1   Q2: Feeling down,  "depressed or hopeless 0   PHQ-2 Score 1   Q1: Little interest or pleasure in doing things Several days   Q2: Feeling down, depressed or hopeless Not at all   PHQ-2 Score 1           4/12/2024   Substance Use   Alcohol more than 3/day or more than 7/wk No   Do you use any other substances recreationally? No     Social History     Tobacco Use    Smoking status: Never    Smokeless tobacco: Never   Vaping Use    Vaping status: Never Used   Substance Use Topics    Alcohol use: No    Drug use: No           4/12/2024   STI Screening   New sexual partner(s) since last STI/HIV test? No   ASCVD Risk   The 10-year ASCVD risk score (Lala IBARRA, et al., 2019) is: 5%    Values used to calculate the score:      Age: 48 years      Sex: Male      Is Non- : No      Diabetic: No      Tobacco smoker: No      Systolic Blood Pressure: 145 mmHg      Is BP treated: No      HDL Cholesterol: 32 mg/dL      Total Cholesterol: 182 mg/dL       Reviewed and updated as needed this visit by Provider                             Objective    Exam  /87   Pulse 82   Temp 98.9  F (37.2  C) (Oral)   Resp 16   Ht 1.759 m (5' 9.25\")   Wt 97 kg (213 lb 12.8 oz)   SpO2 97%   BMI 31.35 kg/m        Physical Exam  GENERAL: alert and no distress  EYES: Eyes grossly normal to inspection, PERRL and conjunctivae and sclerae normal  HENT: ear canals and TM's normal, nose and mouth without ulcers or lesions  NECK: no adenopathy, no asymmetry, masses, or scars  RESP: lungs clear to auscultation - no rales, rhonchi or wheezes  BREAST: normal without masses, tenderness or nipple discharge and no palpable axillary masses or adenopathy  CV: regular rate and rhythm, normal S1 S2, no S3 or S4, no murmur, click or rub, no peripheral edema   ABDOMEN: soft, nontender, no hepatosplenomegaly, no masses and bowel sounds normal   (male): normal male genitalia without lesions or urethral discharge, no hernia  RECTAL (male): normal " sphincter tone, no rectal masses, prostate normal size, smooth, nontender without nodules or masses  MS: no gross musculoskeletal defects noted, no edema  SKIN: no suspicious lesions or rashes  NEURO: Normal strength and tone, mentation intact and speech normal  PSYCH: mentation appears normal, affect normal/bright, and mildly anxious  LYMPH: no cervical, supraclavicular, axillary, or inguinal adenopathy        Signed Electronically by: Dominick Leyva MD

## 2024-04-19 NOTE — NURSING NOTE
Prior to immunization administration, verified patients identity using patient s name and date of birth. Please see Immunization Activity for additional information.     Screening Questionnaire for Adult Immunization    Are you sick today?   No   Do you have allergies to medications, food, a vaccine component or latex?   No   Have you ever had a serious reaction after receiving a vaccination?   No   Do you have a long-term health problem with heart, lung, kidney, or metabolic disease (e.g., diabetes), asthma, a blood disorder, no spleen, complement component deficiency, a cochlear implant, or a spinal fluid leak?  Are you on long-term aspirin therapy?   No   Do you have cancer, leukemia, HIV/AIDS, or any other immune system problem?   No   Do you have a parent, brother, or sister with an immune system problem?   No   In the past 3 months, have you taken medications that affect  your immune system, such as prednisone, other steroids, or anticancer drugs; drugs for the treatment of rheumatoid arthritis, Crohn s disease, or psoriasis; or have you had radiation treatments?   No   Have you had a seizure, or a brain or other nervous system problem?   No   During the past year, have you received a transfusion of blood or blood    products, or been given immune (gamma) globulin or antiviral drug?   No   For women: Are you pregnant or is there a chance you could become       pregnant during the next month?   No   Have you received any vaccinations in the past 4 weeks?   No     Immunization questionnaire answers were all negative.      Patient instructed to remain in clinic for 15 minutes afterwards, and to report any adverse reactions.     Screening performed by Betzy Mueller on 4/19/2024 at 7:39 AM.

## 2024-04-20 LAB
ALBUMIN SERPL BCG-MCNC: 4.5 G/DL (ref 3.5–5.2)
ALP SERPL-CCNC: 84 U/L (ref 40–150)
ALT SERPL W P-5'-P-CCNC: 35 U/L (ref 0–70)
ANION GAP SERPL CALCULATED.3IONS-SCNC: 9 MMOL/L (ref 7–15)
AST SERPL W P-5'-P-CCNC: 28 U/L (ref 0–45)
BILIRUB SERPL-MCNC: 0.9 MG/DL
BUN SERPL-MCNC: 11.2 MG/DL (ref 6–20)
CALCIUM SERPL-MCNC: 9.3 MG/DL (ref 8.6–10)
CHLORIDE SERPL-SCNC: 102 MMOL/L (ref 98–107)
CHOLEST SERPL-MCNC: 193 MG/DL
CREAT SERPL-MCNC: 1.17 MG/DL (ref 0.67–1.17)
DEPRECATED HCO3 PLAS-SCNC: 28 MMOL/L (ref 22–29)
EGFRCR SERPLBLD CKD-EPI 2021: 77 ML/MIN/1.73M2
FASTING STATUS PATIENT QL REPORTED: YES
GLUCOSE SERPL-MCNC: 104 MG/DL (ref 70–99)
HDLC SERPL-MCNC: 31 MG/DL
LDLC SERPL CALC-MCNC: 91 MG/DL
NONHDLC SERPL-MCNC: 162 MG/DL
POTASSIUM SERPL-SCNC: 4.2 MMOL/L (ref 3.4–5.3)
PROT SERPL-MCNC: 6.9 G/DL (ref 6.4–8.3)
PSA SERPL DL<=0.01 NG/ML-MCNC: 0.39 NG/ML (ref 0–2.5)
SODIUM SERPL-SCNC: 139 MMOL/L (ref 135–145)
TRIGL SERPL-MCNC: 354 MG/DL

## 2024-06-08 NOTE — TELEPHONE ENCOUNTER
Routing refill request to provider for review/approval because:  Drug not on the [facility] refill protocol   Yaneli Cleveland BSN, RN    
,

## 2024-07-08 ENCOUNTER — MYC MEDICAL ADVICE (OUTPATIENT)
Dept: FAMILY MEDICINE | Facility: CLINIC | Age: 49
End: 2024-07-08
Payer: COMMERCIAL

## 2024-07-08 NOTE — TELEPHONE ENCOUNTER
Printed form and placed In providers basket   Please route back to tc's once complete    Thank you,  Suly GALLARDO    [phone]

## 2024-07-09 ENCOUNTER — MYC MEDICAL ADVICE (OUTPATIENT)
Dept: FAMILY MEDICINE | Facility: CLINIC | Age: 49
End: 2024-07-09
Payer: COMMERCIAL

## 2024-07-09 NOTE — TELEPHONE ENCOUNTER
Placed a copy of the form in the TC form folder and sent a copy to be scanned into the chart.Rosaura Cameron [facility]

## 2024-07-14 ASSESSMENT — ASTHMA QUESTIONNAIRES
ACT_TOTALSCORE: 24
ACT_TOTALSCORE: 24
QUESTION_1 LAST FOUR WEEKS HOW MUCH OF THE TIME DID YOUR ASTHMA KEEP YOU FROM GETTING AS MUCH DONE AT WORK, SCHOOL OR AT HOME: NONE OF THE TIME
QUESTION_3 LAST FOUR WEEKS HOW OFTEN DID YOUR ASTHMA SYMPTOMS (WHEEZING, COUGHING, SHORTNESS OF BREATH, CHEST TIGHTNESS OR PAIN) WAKE YOU UP AT NIGHT OR EARLIER THAN USUAL IN THE MORNING: NOT AT ALL
QUESTION_5 LAST FOUR WEEKS HOW WOULD YOU RATE YOUR ASTHMA CONTROL: WELL CONTROLLED
QUESTION_4 LAST FOUR WEEKS HOW OFTEN HAVE YOU USED YOUR RESCUE INHALER OR NEBULIZER MEDICATION (SUCH AS ALBUTEROL): NOT AT ALL
QUESTION_2 LAST FOUR WEEKS HOW OFTEN HAVE YOU HAD SHORTNESS OF BREATH: NOT AT ALL

## 2024-07-15 ENCOUNTER — VIRTUAL VISIT (OUTPATIENT)
Dept: FAMILY MEDICINE | Facility: CLINIC | Age: 49
End: 2024-07-15
Payer: COMMERCIAL

## 2024-07-15 DIAGNOSIS — I10 HYPERTENSION GOAL BP (BLOOD PRESSURE) < 140/90: Primary | ICD-10-CM

## 2024-07-15 DIAGNOSIS — F41.1 GAD (GENERALIZED ANXIETY DISORDER): ICD-10-CM

## 2024-07-15 DIAGNOSIS — G43.809 OTHER MIGRAINE WITHOUT STATUS MIGRAINOSUS, NOT INTRACTABLE: ICD-10-CM

## 2024-07-15 PROCEDURE — 99214 OFFICE O/P EST MOD 30 MIN: CPT | Mod: 95 | Performed by: FAMILY MEDICINE

## 2024-07-15 RX ORDER — METOPROLOL SUCCINATE 25 MG/1
25 TABLET, EXTENDED RELEASE ORAL DAILY
Qty: 60 TABLET | Refills: 2 | Status: SHIPPED | OUTPATIENT
Start: 2024-07-15 | End: 2024-09-30

## 2024-07-15 NOTE — PROGRESS NOTES
John is a 48 year old who is being evaluated via a billable video visit.    How would you like to obtain your AVS? MyChart  If the video visit is dropped, the invitation should be resent by: Text to cell phone: [phone]  Will anyone else be joining your video visit? No      ASSESSMENT / PLAN:  (I10) Hypertension goal BP (blood pressure) < 140/90  (primary encounter diagnosis)  Comment: needs help  Plan: metoprolol succinate ER (TOPROL XL) 25 MG 24 hr        tablet        Reveiwed risks and side effects of medication  Can titrate to 100mg and if still not at goal will add hydrochlorothiazide. Continue lower sodium diet/exercises - add weight lifting and self-monitor. Chest pain or shortness of breath to er. Would like update on blood pressure in a couple weeks  Recheck in 6 months if stable    (G43.809) Other migraine without status migrainosus, not intractable  Comment: stable  Plan: metoprolol succinate ER (TOPROL XL) 25 MG 24 hr        tablet        Toprol should help too    (F41.1) CLAUDINE (generalized anxiety disorder)  Comment: stable  Plan: metoprolol succinate ER (TOPROL XL) 25 MG 24 hr        tablet        Continue effexor. Toprol should help too.       Subjective   John is a 48 year old, presenting for the following health issues:  Hypertension  Follow-up elevated. No chest pain or shortness of breath. Exercise-  walking a lot more. Lower sodium.   No mi/cva. Mom with htn. Rare albuterol.   Follow-up migraines, anxiety and htn.      7/15/2024     8:39 AM   Additional Questions   Roomed by THIERRY Mueller CMA     History of Present Illness       Hypertension: He presents for follow up of hypertension.  He does check blood pressure  regularly outside of the clinic. Outside blood pressures have been over 140/90. He follows a low salt diet.     He eats 2-3 servings of fruits and vegetables daily.He consumes 1 sweetened beverage(s) daily.He exercises with enough effort to increase his heart rate 20 to 29 minutes per  day.  He exercises with enough effort to increase his heart rate 4 days per week.   He is taking medications regularly.                   Objective           Vitals:  No vitals were obtained today due to virtual visit.    Physical Exam   GENERAL: alert and no distress  EYES: Eyes grossly normal to inspection.  No discharge or erythema, or obvious scleral/conjunctival abnormalities.  RESP: No audible wheeze, cough, or visible cyanosis.    SKIN: Visible skin clear. No significant rash, abnormal pigmentation or lesions.  NEURO: Cranial nerves grossly intact.  Mentation and speech appropriate for age.  PSYCH: Appropriate affect, tone, and pace of words          Video-Visit Details    Type of service:  Video Visit   Originating Location (pt. Location): Home    Distant Location (provider location):  On-site  Platform used for Video Visit: Mati  Signed Electronically by: Dominick Leyva MD

## 2024-08-14 ENCOUNTER — MYC MEDICAL ADVICE (OUTPATIENT)
Dept: FAMILY MEDICINE | Facility: CLINIC | Age: 49
End: 2024-08-14
Payer: COMMERCIAL

## 2024-08-14 DIAGNOSIS — G43.809 OTHER MIGRAINE WITHOUT STATUS MIGRAINOSUS, NOT INTRACTABLE: ICD-10-CM

## 2024-08-14 DIAGNOSIS — I10 HYPERTENSION GOAL BP (BLOOD PRESSURE) < 140/90: ICD-10-CM

## 2024-08-14 DIAGNOSIS — F41.1 GAD (GENERALIZED ANXIETY DISORDER): ICD-10-CM

## 2024-08-14 RX ORDER — METOPROLOL SUCCINATE 100 MG/1
100 TABLET, EXTENDED RELEASE ORAL DAILY
Qty: 90 TABLET | Refills: 1 | Status: SHIPPED | OUTPATIENT
Start: 2024-08-14

## 2024-09-09 ENCOUNTER — MYC MEDICAL ADVICE (OUTPATIENT)
Dept: FAMILY MEDICINE | Facility: CLINIC | Age: 49
End: 2024-09-09
Payer: COMMERCIAL

## 2024-09-30 ENCOUNTER — OFFICE VISIT (OUTPATIENT)
Dept: FAMILY MEDICINE | Facility: CLINIC | Age: 49
End: 2024-09-30
Payer: COMMERCIAL

## 2024-09-30 VITALS
OXYGEN SATURATION: 97 % | RESPIRATION RATE: 16 BRPM | TEMPERATURE: 97.9 F | HEART RATE: 97 BPM | HEIGHT: 69 IN | BODY MASS INDEX: 32.35 KG/M2 | SYSTOLIC BLOOD PRESSURE: 136 MMHG | WEIGHT: 218.4 LBS | DIASTOLIC BLOOD PRESSURE: 86 MMHG

## 2024-09-30 DIAGNOSIS — F41.1 GAD (GENERALIZED ANXIETY DISORDER): ICD-10-CM

## 2024-09-30 DIAGNOSIS — G43.809 OTHER MIGRAINE WITHOUT STATUS MIGRAINOSUS, NOT INTRACTABLE: ICD-10-CM

## 2024-09-30 DIAGNOSIS — I10 HYPERTENSION GOAL BP (BLOOD PRESSURE) < 140/90: ICD-10-CM

## 2024-09-30 DIAGNOSIS — M79.672 PAIN OF LEFT HEEL: Primary | ICD-10-CM

## 2024-09-30 PROCEDURE — 99214 OFFICE O/P EST MOD 30 MIN: CPT | Performed by: FAMILY MEDICINE

## 2024-09-30 PROCEDURE — 91320 SARSCV2 VAC 30MCG TRS-SUC IM: CPT | Performed by: FAMILY MEDICINE

## 2024-09-30 PROCEDURE — 90480 ADMN SARSCOV2 VAC 1/ONLY CMP: CPT | Performed by: FAMILY MEDICINE

## 2024-09-30 RX ORDER — HYDROCHLOROTHIAZIDE 12.5 MG/1
12.5 TABLET ORAL DAILY
Qty: 30 TABLET | Refills: 4 | Status: SHIPPED | OUTPATIENT
Start: 2024-09-30

## 2024-09-30 RX ORDER — HYDROCHLOROTHIAZIDE 12.5 MG/1
TABLET ORAL
Qty: 90 TABLET | OUTPATIENT
Start: 2024-09-30

## 2024-09-30 ASSESSMENT — PAIN SCALES - GENERAL: PAINLEVEL: NO PAIN (0)

## 2024-09-30 NOTE — PROGRESS NOTES
"  ASSESSMENT / PLAN:  (M79.972) Pain of left heel  (primary encounter diagnosis)  Comment: likely achilles tendonitis  Plan: Orthopedic  Referral        Stretching/heat and good shoe wear/arch support. Follow-up podiatry if worse/not improving    (I10) Hypertension goal BP (blood pressure) < 140/90  Comment: a little high at time  Plan: hydroCHLOROthiazide 12.5 MG tablet        Continue metoprolol.  Add hydrochlorothiazide. Reveiwed risks and side effects of medication  Extra potassium in diet. Chest pain or shortness of breath to er. Recheck in 6 months  Sooner if worse/new issues.     (G43.809) Other migraine without status migrainosus, not intractable  Comment: stable        Subjective   John is a 49 year old, presenting for the following health issues:  Left heel pain.  History migraines, anxiety and htn.   Walking more. Flat. No chest pain or shortness of breath. Outside blood pressure ok.   Better shoes. Pain past 2 months. Worse with sandles. Tenderness and mild swelling heel insertion.  Ice better then heat.   Worse in AM. Ibuprofen. No inserts. Limited stretching.   Heel pain (Left heel pain. )      9/30/2024     2:33 PM   Additional Questions   Roomed by JAVIER GAITAN   Accompanied by SELF     History of Present Illness       Reason for visit:  Heel pain, left foot  Symptom onset:  More than a month  Symptoms include:  See messages for details  Symptom intensity:  Moderate  Symptom progression:  Staying the same  Had these symptoms before:  No  What makes it worse:  Crappy shoes, not wearing shoes and smacking the heel with chair  What makes it better:  Ice, foot up, ibuprophen,  in bed, bingeing That 70's show and lots of hugs   He is taking medications regularly.             Objective    /86   Pulse 97   Temp 97.9  F (36.6  C) (Tympanic)   Resp 16   Ht 1.753 m (5' 9\")   Wt 99.1 kg (218 lb 6.4 oz)   SpO2 97%   BMI 32.25 kg/m      Physical Exam   GENERAL: alert and no distress  EYES: " Eyes grossly normal to inspection, PERRL and conjunctivae and sclerae normal  NECK: no adenopathy, no asymmetry, masses, or scars  RESP: lungs clear to auscultation - no rales, rhonchi or wheezes  CV: regular rate and rhythm, normal S1 S2, no S3 or S4, no murmur, click or rub, no peripheral edema   ABDOMEN: soft, nontender, no hepatosplenomegaly, no masses and bowel sounds normal  MS: no gross musculoskeletal defects noted, no edema  MS: tenderness and mild swelling left achilles tendon insertion.  SKIN: no suspicious lesions or rashes  NEURO: Normal strength and tone, mentation intact and speech normal  PSYCH: mentation appears normal, affect normal/bright            Signed Electronically by: Dominick Leyva MD

## 2024-10-01 ENCOUNTER — PATIENT OUTREACH (OUTPATIENT)
Dept: CARE COORDINATION | Facility: CLINIC | Age: 49
End: 2024-10-01
Payer: COMMERCIAL

## 2024-10-03 ENCOUNTER — PATIENT OUTREACH (OUTPATIENT)
Dept: CARE COORDINATION | Facility: CLINIC | Age: 49
End: 2024-10-03
Payer: COMMERCIAL

## 2024-10-13 NOTE — TELEPHONE ENCOUNTER
REASON FOR VISIT: L foot- pain    DATE OF APPT: 11/07/2024   NOTES (FOR ALL VISITS) STATUS DETAILS   OFFICE NOTE from referring provider [facility] Domincik Macedo MD 9/30/2024   EMG N/A    MEDICATION LIST N/A    IMAGING  (FOR ALL VISITS)     XR N/A    MRI (HEAD, NECK, SPINE) N/A    CT (HEAD, NECK, SPINE) N/A

## 2024-10-30 DIAGNOSIS — F41.1 GAD (GENERALIZED ANXIETY DISORDER): ICD-10-CM

## 2024-10-30 RX ORDER — VENLAFAXINE HYDROCHLORIDE 75 MG/1
CAPSULE, EXTENDED RELEASE ORAL
Qty: 90 CAPSULE | Refills: 1 | Status: SHIPPED | OUTPATIENT
Start: 2024-10-30

## 2024-11-07 ENCOUNTER — OFFICE VISIT (OUTPATIENT)
Dept: ORTHOPEDICS | Facility: CLINIC | Age: 49
End: 2024-11-07
Attending: FAMILY MEDICINE
Payer: COMMERCIAL

## 2024-11-07 ENCOUNTER — PRE VISIT (OUTPATIENT)
Dept: ORTHOPEDICS | Facility: CLINIC | Age: 49
End: 2024-11-07

## 2024-11-07 ENCOUNTER — ANCILLARY PROCEDURE (OUTPATIENT)
Dept: GENERAL RADIOLOGY | Facility: CLINIC | Age: 49
End: 2024-11-07
Attending: PREVENTIVE MEDICINE
Payer: COMMERCIAL

## 2024-11-07 VITALS — HEIGHT: 69 IN | WEIGHT: 218 LBS | BODY MASS INDEX: 32.29 KG/M2

## 2024-11-07 DIAGNOSIS — M76.62 LEFT ACHILLES BURSITIS: Primary | ICD-10-CM

## 2024-11-07 DIAGNOSIS — M79.672 PAIN OF LEFT HEEL: ICD-10-CM

## 2024-11-07 PROCEDURE — 99214 OFFICE O/P EST MOD 30 MIN: CPT | Performed by: PREVENTIVE MEDICINE

## 2024-11-07 PROCEDURE — 73630 X-RAY EXAM OF FOOT: CPT | Mod: LT | Performed by: RADIOLOGY

## 2024-11-07 RX ORDER — MELOXICAM 15 MG/1
15 TABLET ORAL DAILY
Qty: 30 TABLET | Refills: 1 | Status: SHIPPED | OUTPATIENT
Start: 2024-11-07

## 2024-11-07 NOTE — PROGRESS NOTES
"HISTORY OF PRESENT ILLNESS  Mr. Poole is a pleasant 49 year old year old male who presents to clinic today with the following:    What problem are you here for: Evaluation for left heel pain. Patient points out that he does have \"pump bump\" over the distal aspect of his Achilles tendon. He will have a burning sensation over the bottom of his heel intermittently. He denies tingling and numbness.    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   Patient reports no previous history.     How long have you had this problem: 3 months     Have you had any recent imaging of this problem? Xrays/MRI/CT scans:  - XR of left foot taken at appointment today.     Have you had treatments for this problem in the past?  -Medications: Ibuprofen, topical Voltaren   -Physical therapy: HEP - stretching calf/lower leg  -Injections: None   -Surgery: None   - Applied heat pack AM and ice packs PM  - Supportive hiking boots: has found this to be helpful due to increased heel cup within boot.   - Shoe Inserts: No  - Ankle brace: No  - Walking boot: used for one week  - Activity modification: no inclined walking    How severe has this problem been at WORST in the past? 0-10 scale: 2-3/10    What do you think caused this problem: August 2024, he worse a pair of Keen sandal for one day and the following day he noticed an increase of left heel soreness and tenderness.     Does this problem or its symptoms cause difficulty for you falling asleep or staying asleep: Yes       MEDICAL HISTORY  Patient Active Problem List   Diagnosis    CARDIOVASCULAR SCREENING; LDL GOAL LESS THAN 160    Insomnia    Eczema    IGT (impaired glucose tolerance)    High triglycerides    HDL deficiency    10 year risk of MI or stroke < 7.5%, 1.1% in April 2017    Other migraine without status migrainosus, not intractable       Current Outpatient Medications   Medication Sig Dispense Refill    albuterol (PROAIR HFA/PROVENTIL HFA/VENTOLIN HFA) 108 (90 Base) " MCG/ACT inhaler Take 2 puffs 15 minute(s) before exercise 18 g 1    butalbital-acetaminophen-caffeine (ESGIC) -40 MG tablet Take 1 tablet by mouth every 6 hours as needed for migraine 30 tablet 2    fluocinonide (LIDEX) 0.05 % external ointment Apply topically 2 times daily Under occlusion (Patient not taking: Reported on 9/30/2024) 30 g 11    hydroCHLOROthiazide 12.5 MG tablet Take 1 tablet (12.5 mg) by mouth daily. In AM as needed for blood pressure >140/90 30 tablet 4    metoprolol succinate ER (TOPROL XL) 100 MG 24 hr tablet Take 1 tablet (100 mg) by mouth daily 90 tablet 1    traZODone (DESYREL) 50 MG tablet Take 1 tablet (50 mg) by mouth nightly as needed for sleep 90 tablet 1    venlafaxine (EFFEXOR XR) 75 MG 24 hr capsule TAKE 1 CAPSULE BY MOUTH EVERY MORNING 90 capsule 1       Allergies   Allergen Reactions    Seasonal Allergies        Family History   Problem Relation Age of Onset    Breast Cancer Mother     Skin Cancer Father     Gastrointestinal Disease Father         crohns    Asthma Father     Genetic Disorder Father         crohns disease    Prostate Cancer Paternal Grandfather     Diabetes Paternal Grandfather     Gastrointestinal Disease Sister         crohns    Asthma Sister     Genetic Disorder Sister         crohns disease    Melanoma No family hx of      Social History     Socioeconomic History    Marital status:    Tobacco Use    Smoking status: Never    Smokeless tobacco: Never   Vaping Use    Vaping status: Never Used   Substance and Sexual Activity    Alcohol use: No    Drug use: No    Sexual activity: Yes     Partners: Female     Birth control/protection: Abstinence, Male Surgical     Comment: patient had vasectomy   Other Topics Concern    Parent/sibling w/ CABG, MI or angioplasty before 65F 55M? No     Social Drivers of Health     Financial Resource Strain: Low Risk  (4/12/2024)    Financial Resource Strain     Within the past 12 months, have you or your family members you  live with been unable to get utilities (heat, electricity) when it was really needed?: No   Food Insecurity: Low Risk  (4/12/2024)    Food Insecurity     Within the past 12 months, did you worry that your food would run out before you got money to buy more?: No     Within the past 12 months, did the food you bought just not last and you didn t have money to get more?: No   Transportation Needs: Low Risk  (4/12/2024)    Transportation Needs     Within the past 12 months, has lack of transportation kept you from medical appointments, getting your medicines, non-medical meetings or appointments, work, or from getting things that you need?: No   Physical Activity: Insufficiently Active (4/12/2024)    Exercise Vital Sign     Days of Exercise per Week: 1 day     Minutes of Exercise per Session: 30 min   Stress: Stress Concern Present (4/12/2024)    [employer] [place] [employer] - Occupational Stress Questionnaire     Feeling of Stress : To some extent   Social Connections: Unknown (4/12/2024)    Social Connection and Isolation Panel [NHANES]     Frequency of Social Gatherings with Friends and Family: Once a week   Interpersonal Safety: Low Risk  (4/19/2024)    Interpersonal Safety     Do you feel physically and emotionally safe where you currently live?: Yes     Within the past 12 months, have you been hit, slapped, kicked or otherwise physically hurt by someone?: No     Within the past 12 months, have you been humiliated or emotionally abused in other ways by your partner or ex-partner?: No   Housing Stability: Low Risk  (4/12/2024)    Housing Stability     Do you have housing? : Yes     Are you worried about losing your housing?: No       Additional medical/Social/Surgical histories reviewed in EPIC and updated as appropriate.     REVIEW OF SYSTEMS (11/7/2024)  10 point ROS of systems including Constitutional, Eyes, Respiratory, Cardiovascular, Gastroenterology, Genitourinary, Integumentary, Musculoskeletal,  "Psychiatric, Allergic/Immunologic were all negative except for pertinent positives noted in my HPI.     PHYSICAL EXAM  VSS  Vital Signs: Ht 1.753 m (5' 9\")   Wt 98.9 kg (218 lb)   BMI 32.19 kg/m   Patient declined being weighed. Body mass index is 32.19 kg/m .    General  - normal appearance, in no obvious distress  HEENT  - conjunctivae not injected, moist mucous membranes, normocephalic/atraumatic head, ears normal appearance, no lesions, mouth normal appearance, no scars, normal dentition and teeth present  CV  - normal pulses at posterior tib and dorsalis pedis  Pulm  - normal respiratory pattern, non-labored  Musculoskeletal - left foot / ankle  - stance: normal gait without limp, normal stance without excessive pronation, normal heel inversion with standing heel raise, no obvious leg length discrepancy, normal heel and toe walk  - inspection: mildly thickened mid-substance Achilles tendon,  normal bone and joint alignment, no obvious deformity  - palpation: tender over the terminal insertion of the Achilles, and swelling at calcaneus  - ROM: some limited passive dorsiflexion, normal plantar flexion, inversion, and eversion  - strength: 5/5 in all planes  Neuro  - no sensory or motor deficit, grossly normal coordination, normal muscle tone  Skin  - no ecchymosis, erythema, warmth, or induration, no obvious rash  Psych  - interactive, appropriate, normal mood and affect   ASSESSMENT & PLAN  50 yo male with left achilles bursitis    I independently reviewed the following imaging studies:  Left foot xray: shows heel osteophytes at distal achilles attachment   Discussed and given heel cups  Topical voltaren PRN  RX given for mobic PRN  Ice and stretching PRN  Followup in 2-3 weeks  Consider injection or medrol pack first if not improving        Appropriate PPE was utilized for prevention of spread of Covid-19.  Harris Vazquez MD, CAQSM    "

## 2024-11-07 NOTE — LETTER
"11/7/2024      Ignacio Poole  [address] [address]      Dear Colleague,    Thank you for referring your patient, Ignacio Poole, to the [facility] [place]. Please see a copy of my visit note below.    HISTORY OF PRESENT ILLNESS  Mr. Poole is a pleasant 49 year old year old male who presents to clinic today with the following:    What problem are you here for: Evaluation for left heel pain. Patient points out that he does have \"pump bump\" over the distal aspect of his Achilles tendon. He will have a burning sensation over the bottom of his heel intermittently. He denies tingling and numbness.    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   Patient reports no previous history.     How long have you had this problem: 3 months     Have you had any recent imaging of this problem? Xrays/MRI/CT scans:  - XR of left foot taken at appointment today.     Have you had treatments for this problem in the past?  -Medications: Ibuprofen, topical Voltaren   -Physical therapy: HEP - stretching calf/lower leg  -Injections: None   -Surgery: None   - Applied heat pack AM and ice packs PM  - Supportive hiking boots: has found this to be helpful due to increased heel cup within boot.   - Shoe Inserts: No  - Ankle brace: No  - Walking boot: used for one week  - Activity modification: no inclined walking    How severe has this problem been at WORST in the past? 0-10 scale: 2-3/10    What do you think caused this problem: August 2024, he worse a pair of Keen sandal for one day and the following day he noticed an increase of left heel soreness and tenderness.     Does this problem or its symptoms cause difficulty for you falling asleep or staying asleep: Yes       MEDICAL HISTORY  Patient Active Problem List   Diagnosis     CARDIOVASCULAR SCREENING; LDL GOAL LESS THAN 160     Insomnia     Eczema     IGT (impaired glucose tolerance)     High triglycerides     " HDL deficiency     10 year risk of MI or stroke < 7.5%, 1.1% in April 2017     Other migraine without status migrainosus, not intractable       Current Outpatient Medications   Medication Sig Dispense Refill     albuterol (PROAIR HFA/PROVENTIL HFA/VENTOLIN HFA) 108 (90 Base) MCG/ACT inhaler Take 2 puffs 15 minute(s) before exercise 18 g 1     butalbital-acetaminophen-caffeine (ESGIC) -40 MG tablet Take 1 tablet by mouth every 6 hours as needed for migraine 30 tablet 2     fluocinonide (LIDEX) 0.05 % external ointment Apply topically 2 times daily Under occlusion (Patient not taking: Reported on 9/30/2024) 30 g 11     hydroCHLOROthiazide 12.5 MG tablet Take 1 tablet (12.5 mg) by mouth daily. In AM as needed for blood pressure >140/90 30 tablet 4     metoprolol succinate ER (TOPROL XL) 100 MG 24 hr tablet Take 1 tablet (100 mg) by mouth daily 90 tablet 1     traZODone (DESYREL) 50 MG tablet Take 1 tablet (50 mg) by mouth nightly as needed for sleep 90 tablet 1     venlafaxine (EFFEXOR XR) 75 MG 24 hr capsule TAKE 1 CAPSULE BY MOUTH EVERY MORNING 90 capsule 1       Allergies   Allergen Reactions     Seasonal Allergies        Family History   Problem Relation Age of Onset     Breast Cancer Mother      Skin Cancer Father      Gastrointestinal Disease Father         crohns     Asthma Father      Genetic Disorder Father         crohns disease     Prostate Cancer Paternal Grandfather      Diabetes Paternal Grandfather      Gastrointestinal Disease Sister         crohns     Asthma Sister      Genetic Disorder Sister         crohns disease     Melanoma No family hx of      Social History     Socioeconomic History     Marital status:    Tobacco Use     Smoking status: Never     Smokeless tobacco: Never   Vaping Use     Vaping status: Never Used   Substance and Sexual Activity     Alcohol use: No     Drug use: No     Sexual activity: Yes     Partners: Female     Birth control/protection: Abstinence, Male Surgical      Comment: patient had vasectomy   Other Topics Concern     Parent/sibling w/ CABG, MI or angioplasty before 65F 55M? No     Social Drivers of Health     Financial Resource Strain: Low Risk  (4/12/2024)    Financial Resource Strain      Within the past 12 months, have you or your family members you live with been unable to get utilities (heat, electricity) when it was really needed?: No   Food Insecurity: Low Risk  (4/12/2024)    Food Insecurity      Within the past 12 months, did you worry that your food would run out before you got money to buy more?: No      Within the past 12 months, did the food you bought just not last and you didn t have money to get more?: No   Transportation Needs: Low Risk  (4/12/2024)    Transportation Needs      Within the past 12 months, has lack of transportation kept you from medical appointments, getting your medicines, non-medical meetings or appointments, work, or from getting things that you need?: No   Physical Activity: Insufficiently Active (4/12/2024)    Exercise Vital Sign      Days of Exercise per Week: 1 day      Minutes of Exercise per Session: 30 min   Stress: Stress Concern Present (4/12/2024)    [employer] - Occupational Stress Questionnaire      Feeling of Stress : To some extent   Social Connections: Unknown (4/12/2024)    Social Connection and Isolation Panel [NHANES]      Frequency of Social Gatherings with Friends and Family: Once a week   Interpersonal Safety: Low Risk  (4/19/2024)    Interpersonal Safety      Do you feel physically and emotionally safe where you currently live?: Yes      Within the past 12 months, have you been hit, slapped, kicked or otherwise physically hurt by someone?: No      Within the past 12 months, have you been humiliated or emotionally abused in other ways by your partner or ex-partner?: No   Housing Stability: Low Risk  (4/12/2024)    Housing Stability      Do you have housing? : Yes      Are you  "worried about losing your housing?: No       Additional medical/Social/Surgical histories reviewed in [facility] and updated as appropriate.     REVIEW OF SYSTEMS (11/7/2024)  10 point ROS of systems including Constitutional, Eyes, Respiratory, Cardiovascular, Gastroenterology, Genitourinary, Integumentary, Musculoskeletal, Psychiatric, Allergic/Immunologic were all negative except for pertinent positives noted in my HPI.     PHYSICAL EXAM  VSS  Vital Signs: Ht 1.753 m (5' 9\")   Wt 98.9 kg (218 lb)   BMI 32.19 kg/m   Patient declined being weighed. Body mass index is 32.19 kg/m .    General  - normal appearance, in no obvious distress  HEENT  - conjunctivae not injected, moist mucous membranes, normocephalic/atraumatic head, ears normal appearance, no lesions, mouth normal appearance, no scars, normal dentition and teeth present  CV  - normal pulses at posterior tib and dorsalis pedis  Pulm  - normal respiratory pattern, non-labored  Musculoskeletal - left foot / ankle  - stance: normal gait without limp, normal stance without excessive pronation, normal heel inversion with standing heel raise, no obvious leg length discrepancy, normal heel and toe walk  - inspection: mildly thickened mid-substance Achilles tendon,  normal bone and joint alignment, no obvious deformity  - palpation: tender over the terminal insertion of the Achilles, and swelling at calcaneus  - ROM: some limited passive dorsiflexion, normal plantar flexion, inversion, and eversion  - strength: 5/5 in all planes  Neuro  - no sensory or motor deficit, grossly normal coordination, normal muscle tone  Skin  - no ecchymosis, erythema, warmth, or induration, no obvious rash  Psych  - interactive, appropriate, normal mood and affect   ASSESSMENT & PLAN  50 yo male with left achilles bursitis    I independently reviewed the following imaging studies:  Left foot xray: shows heel osteophytes at distal achilles attachment   Discussed and given heel cups  Topical " voltaren PRN  RX given for mobic PRN  Ice and stretching PRN  Followup in 2-3 weeks  Consider injection or medrol pack first if not improving        Appropriate PPE was utilized for prevention of spread of Covid-19.  Harris Vazquez MD, CAQSM      Again, thank you for allowing me to participate in the care of your patient.        Sincerely,        Harris Vazquez MD

## 2024-12-14 ENCOUNTER — OFFICE VISIT (OUTPATIENT)
Dept: URGENT CARE | Facility: URGENT CARE | Age: 49
End: 2024-12-14
Payer: COMMERCIAL

## 2024-12-14 VITALS
DIASTOLIC BLOOD PRESSURE: 76 MMHG | RESPIRATION RATE: 23 BRPM | HEART RATE: 64 BPM | TEMPERATURE: 98.9 F | OXYGEN SATURATION: 97 % | WEIGHT: 220 LBS | BODY MASS INDEX: 32.49 KG/M2 | SYSTOLIC BLOOD PRESSURE: 122 MMHG

## 2024-12-14 DIAGNOSIS — L60.0 INGROWN NAIL OF GREAT TOE: Primary | ICD-10-CM

## 2024-12-14 PROCEDURE — 99213 OFFICE O/P EST LOW 20 MIN: CPT | Performed by: FAMILY MEDICINE

## 2024-12-14 RX ORDER — CEPHALEXIN 500 MG/1
500 CAPSULE ORAL 3 TIMES DAILY
Qty: 30 CAPSULE | Refills: 0 | Status: SHIPPED | OUTPATIENT
Start: 2024-12-14 | End: 2024-12-24

## 2024-12-14 NOTE — PROGRESS NOTES
Assessment & Plan     Ingrown nail of great toe  Differential discussed in detail.  Physical examination consistent with ingrown nail of left great toe.  Cephalexin prescribed to cover possible infection.  Recommended to keep area dry and clean and continue soaking foot in Epsom salt water.  Podiatry referral placed further evaluation.  All questions answered.  - cephALEXin (KEFLEX) 500 MG capsule; Take 1 capsule (500 mg) by mouth 3 times daily for 10 days.  - Orthopedic  Referral; Future      Subjective   John is a 49 year old, presenting for the following health issues:  Urgent Care (c/o painful ingrown toenail on left big toe for 1+ days. )    HPI     Concern -   Onset: few days   Description: left great toe painful, ingrown nail of great toe  Intensity: moderate  Progression of Symptoms:  same  Accompanying Signs & Symptoms:   Previous history of similar problem:   Therapies tried and outcome: epsom salt water         Review of Systems  Constitutional, HEENT, cardiovascular, pulmonary, gi and gu systems are negative, except as otherwise noted.      Objective    /76   Pulse 64   Temp 98.9  F (37.2  C) (Tympanic)   Resp 23   Wt 99.8 kg (220 lb)   SpO2 97%   BMI 32.49 kg/m    Body mass index is 32.49 kg/m .  Physical Exam   GENERAL: alert and no distress  EYES: Eyes grossly normal to inspection, PERRL and conjunctivae and sclerae normal  RESP: no wheezes  SKIN: Ingrown nail of left great toe, tender on palpation, slightly erythematous, no discharge noted pedal pulses 3+, sensation to touch and pressure intact  NEURO: Normal strength and tone, mentation intact and speech normal  PSYCH: mentation appears normal, affect normal/bright      Signed Electronically by: Severo Fitzgerald MD

## 2024-12-16 ENCOUNTER — PATIENT OUTREACH (OUTPATIENT)
Dept: CARE COORDINATION | Facility: CLINIC | Age: 49
End: 2024-12-16
Payer: COMMERCIAL

## 2024-12-19 ENCOUNTER — OFFICE VISIT (OUTPATIENT)
Dept: PODIATRY | Facility: CLINIC | Age: 49
End: 2024-12-19
Attending: FAMILY MEDICINE
Payer: COMMERCIAL

## 2024-12-19 VITALS — OXYGEN SATURATION: 100 % | HEART RATE: 80 BPM

## 2024-12-19 DIAGNOSIS — L60.0 INGROWN NAIL OF GREAT TOE: ICD-10-CM

## 2024-12-19 DIAGNOSIS — M76.62 ACHILLES TENDINITIS OF LEFT LOWER EXTREMITY: Primary | ICD-10-CM

## 2024-12-19 NOTE — PATIENT INSTRUCTIONS
We wish you continued good healing. If you have any questions or concerns, please do not hesitate to contact us at  [phone]    [facility] (secure e-mail communication and access to your chart) to send a message or to make an appointment.    Please remember to call and schedule a follow up appointment if one was recommended at your earliest convenience.     PODIATRY CLINIC HOURS  TELEPHONE NUMBER    Dr. Ziggy SHAHPBRADLY FACFAS        Clinics:  [facility]  Tuesday 1PM-6PM  [place]  Wednesday 745AM-330PM  [place]  Monday 2nd,4th  830AM-4PM  Thursday/Friday 745AM-230PM     [facility] APPOINTMENTS  [phone]    [place] APPOINTMENTS  [phone]          If you need a medication refill, please contact us you may need lab work and/or a follow up visit prior to your refill (i.e. Antifungal medications).  If MRI needed please call Imaging at [phone]   HOW DO I GET MY KNEE SCOOTER? Knee scooters can be picked up at ANY Medical Supply stores with your knee scooter Prescription.  OR  Bring your signed prescription to an [facility] showroom.   Set up an appointment for your custom Orthotics. Call any Orthotics locations call [phone]         [facility] TOENAIL REMOVAL AFTERCARE     Go directly home and elevate the affected foot on one or two pillows for the remainder of the day/evening if possible. Your toe may stay numb anywhere from 2-8 hours.   Take Tylenol, ibuprofen or another anti-inflammatory as needed for pain.   That evening of the procedure,  you may remove the bandage.(you may soak it in warm soapy water ) After soaks, pat the area dry and then allow to airdry for a few minutes. Apply antibiotic ointment to the area and cover with  band-aid.  The following day. Find a shoe that is comfortable and minimizes the amount of rubbing on your toe, as this increases pain.  Dress with band-aids until no  longer draining, typically 3 days.  Watch for any signs and symptoms of infection such as: redness, red streaks going up the foot/leg, swelling, pus or foul odor. Fevers > 101   Please call with questions.    Dr. Ziggy Metzger D.P.M FAC FAS

## 2024-12-19 NOTE — LETTER
12/19/2024      Ignacio Poole  [address] [address]      Dear Colleague,    Thank you for referring your patient, Ignacio Poole, to the [facility]. Please see a copy of my visit note below.    Subjective:    Pt is seen today in consult from Dr. Fitzgerald w/ the c/c of a painful ingrown left great nail medial border.  This has been problematic for 2 week(s).  negativehistory of drainage from the site.  He has never had this before.  Denies purulence or odor or erythema.  This is slowly getting worse.  Aggravated by activity and relieved by rest.  Has tried soaking which has not helped.   denies history of trauma to the area, but he does have left insertional Achilles tendinitis which is affecting his gait.  Has had this for 6 to 8 months.  He is use Voltaren gel.  He got a better outside shoe.  Works at a sitdown job.  Denies any pain in the contralateral foot.  Started when he wore a poor pair of shoes..  Denies fever and chill, denies numbness and tingling, denies erythema on dorsum of foot.     ROS:  see above    Past Medical History:   Diagnosis Date     Migraine      Uncomplicated asthma youth    excersize induced, cold weather       Past Surgical History:   Procedure Laterality Date     BIOPSY  youth    lymph node neck     BIOPSY  8/27/85    lymph node neck     ORTHOPEDIC SURGERY  5/2019    Broken left clavicle. Plate installed.       Family History   Problem Relation Age of Onset     Breast Cancer Mother      Skin Cancer Father      Gastrointestinal Disease Father         crohns     Asthma Father      Genetic Disorder Father         crohns disease     Prostate Cancer Paternal Grandfather      Diabetes Paternal Grandfather      Gastrointestinal Disease Sister         crohns     Asthma Sister      Genetic Disorder Sister         crohns disease     Melanoma No family hx of        Social History     Tobacco Use     Smoking status: Never     Smokeless tobacco:  Never   Substance Use Topics     Alcohol use: No         Current Outpatient Medications:      albuterol (PROAIR HFA/PROVENTIL HFA/VENTOLIN HFA) 108 (90 Base) MCG/ACT inhaler, Take 2 puffs 15 minute(s) before exercise, Disp: 18 g, Rfl: 1     butalbital-acetaminophen-caffeine (ESGIC) -40 MG tablet, Take 1 tablet by mouth every 6 hours as needed for migraine, Disp: 30 tablet, Rfl: 2     cephALEXin (KEFLEX) 500 MG capsule, Take 1 capsule (500 mg) by mouth 3 times daily for 10 days., Disp: 30 capsule, Rfl: 0     fluocinonide (LIDEX) 0.05 % external ointment, Apply topically 2 times daily Under occlusion, Disp: 30 g, Rfl: 11     hydroCHLOROthiazide 12.5 MG tablet, Take 1 tablet (12.5 mg) by mouth daily. In AM as needed for blood pressure >140/90, Disp: 30 tablet, Rfl: 4     meloxicam (MOBIC) 15 MG tablet, Take 1 tablet (15 mg) by mouth daily. (Patient not taking: Reported on 12/14/2024), Disp: 30 tablet, Rfl: 1     metoprolol succinate ER (TOPROL XL) 100 MG 24 hr tablet, Take 1 tablet (100 mg) by mouth daily, Disp: 90 tablet, Rfl: 1     venlafaxine (EFFEXOR XR) 75 MG 24 hr capsule, TAKE 1 CAPSULE BY MOUTH EVERY MORNING, Disp: 90 capsule, Rfl: 1    Current Facility-Administered Medications:      triamcinolone (KENALOG-40) injection 40 mg, 40 mg, , , Harris Vazquez MD, 40 mg at 05/08/23 0832       Allergies   Allergen Reactions     Seasonal Allergies        Pulse 80   SpO2 100% .      Constitutional/ general:  Pt is in no apparent distress, appears well-nourished.  Cooperative with history and physical exam.     Psych:  The patient answered questions appropriately.  Normal affect.  Seems to have reasonable expectations, in terms of treatment.     Lungs:  Non labored breathing, non labored speech. No cough.  No audible wheezing. Even, quiet breathing.       Vascular:  Pedal pulses are palpable bilaterally for both the DP and PT arteries.  CFT < 3 sec.  No ankle varicosities or edema.  Pedal hair growth noted.      Neuro:  Alert and oriented x 3. Coordinated gait.  Light touch sensation is intact     Derm: Normal texture and turgor.  No ecchymosis, or cyanosis.  Hair growth noted.        Musculoskeletal:   Cavus arch.  Pain at left Achilles central insertion.  No pain on medial or lateral assertion.  No pain on bursal.  No calcaneal compression pain.  No pain plantar heel.  Patient is ambulatory without an assistive device or brace.   left great toe nail medial border shows soft tissue impingement with localized erythema.   negative active drainage/purulence at this time.  No sinus tracts.  No nailbed masses or exostosis.  No pain with range of motion of IPJ or MTPJ.  No ascending cellulitis.    Narrative & Impression   EXAM: XR FOOT LEFT G/E 3 VIEWS  LOCATION: [facility]  DATE: 11/7/2024     INDICATION: Pain of left heel.     COMPARISON: None.                                                                      IMPRESSION: Normal joint spaces and alignment. No fracture. Distal Achilles tendon enthesopathy. Calcaneal heel spur. Small calcaneal bone island.       ASSESSMENT:  Insertional Achilles tendinitis  Onychocryptosis with paronychia left toe.    Discussed how cavus foot puts patient at greater risk for insertional Achilles tendinitis.  Discussed altered gait could contribute to ingrown nail.  Continue Voltaren gel.  Discussed massaging this to help heal and decrease inflammation.  Suggested good how shoes.  Avoid activities that bother this and discussed will bother this.  We discussed heel lifts.  If he would like to try these in the future he will get these.  Also discussed physical therapy.  Discussed iontophoresis.  Patient will call if he would like to do this.  Sometimes immobilization can help this.    Discussed etiology and treatment options in detail w/ the pt.  The potential causes and nature of an ingrown toenail were discussed with the patient.  We reviewed the natural  history/prognosis of the condition and potential risks if no treatment is provided.      After thorough discussion and answering all questions, the patient elected to have nail avulsion.  Obtained consent, used 3cc of 1% lidocaine plain to block left first toe.  Sterile prep, then avulsed the affected border(s).  No evidence of deep abscess noted.  Pt tolerated procedure well.  Sterile bandage placed, gave wound care instruction.  Instructed patient on trimming nails correctly.  They will avoid tight shoes.  Avoid pedicures.  Discussed permanent removal of border if chronic problem.  Return to clinic prn.  Thank you for allowing me participate in the care of this patient.        Ziggy Metzger DPM, FACFAS        Again, thank you for allowing me to participate in the care of your patient.        Sincerely,        Ziggy Metzger DPM

## 2024-12-19 NOTE — PROGRESS NOTES
Subjective:    Pt is seen today in consult from Dr. Fitzgerald w/ the c/c of a painful ingrown left great nail medial border.  This has been problematic for 2 week(s).  negativehistory of drainage from the site.  He has never had this before.  Denies purulence or odor or erythema.  This is slowly getting worse.  Aggravated by activity and relieved by rest.  Has tried soaking which has not helped.   denies history of trauma to the area, but he does have left insertional Achilles tendinitis which is affecting his gait.  Has had this for 6 to 8 months.  He is use Voltaren gel.  He got a better outside shoe.  Works at a sitdown job.  Denies any pain in the contralateral foot.  Started when he wore a poor pair of shoes..  Denies fever and chill, denies numbness and tingling, denies erythema on dorsum of foot.     ROS:  see above    Past Medical History:   Diagnosis Date    Migraine     Uncomplicated asthma youth    excersize induced, cold weather       Past Surgical History:   Procedure Laterality Date    BIOPSY  youth    lymph node neck    BIOPSY  8/27/85    lymph node neck    ORTHOPEDIC SURGERY  5/2019    Broken left clavicle. Plate installed.       Family History   Problem Relation Age of Onset    Breast Cancer Mother     Skin Cancer Father     Gastrointestinal Disease Father         crohns    Asthma Father     Genetic Disorder Father         crohns disease    Prostate Cancer Paternal Grandfather     Diabetes Paternal Grandfather     Gastrointestinal Disease Sister         crohns    Asthma Sister     Genetic Disorder Sister         crohns disease    Melanoma No family hx of        Social History     Tobacco Use    Smoking status: Never    Smokeless tobacco: Never   Substance Use Topics    Alcohol use: No         Current Outpatient Medications:     albuterol (PROAIR HFA/PROVENTIL HFA/VENTOLIN HFA) 108 (90 Base) MCG/ACT inhaler, Take 2 puffs 15 minute(s) before exercise, Disp: 18 g, Rfl: 1     butalbital-acetaminophen-caffeine (ESGIC) -40 MG tablet, Take 1 tablet by mouth every 6 hours as needed for migraine, Disp: 30 tablet, Rfl: 2    cephALEXin (KEFLEX) 500 MG capsule, Take 1 capsule (500 mg) by mouth 3 times daily for 10 days., Disp: 30 capsule, Rfl: 0    fluocinonide (LIDEX) 0.05 % external ointment, Apply topically 2 times daily Under occlusion, Disp: 30 g, Rfl: 11    hydroCHLOROthiazide 12.5 MG tablet, Take 1 tablet (12.5 mg) by mouth daily. In AM as needed for blood pressure >140/90, Disp: 30 tablet, Rfl: 4    meloxicam (MOBIC) 15 MG tablet, Take 1 tablet (15 mg) by mouth daily. (Patient not taking: Reported on 12/14/2024), Disp: 30 tablet, Rfl: 1    metoprolol succinate ER (TOPROL XL) 100 MG 24 hr tablet, Take 1 tablet (100 mg) by mouth daily, Disp: 90 tablet, Rfl: 1    venlafaxine (EFFEXOR XR) 75 MG 24 hr capsule, TAKE 1 CAPSULE BY MOUTH EVERY MORNING, Disp: 90 capsule, Rfl: 1    Current Facility-Administered Medications:     triamcinolone (KENALOG-40) injection 40 mg, 40 mg, , , Harris Vazquez MD, 40 mg at 05/08/23 0832       Allergies   Allergen Reactions    Seasonal Allergies        Pulse 80   SpO2 100% .      Constitutional/ general:  Pt is in no apparent distress, appears well-nourished.  Cooperative with history and physical exam.     Psych:  The patient answered questions appropriately.  Normal affect.  Seems to have reasonable expectations, in terms of treatment.     Lungs:  Non labored breathing, non labored speech. No cough.  No audible wheezing. Even, quiet breathing.       Vascular:  Pedal pulses are palpable bilaterally for both the DP and PT arteries.  CFT < 3 sec.  No ankle varicosities or edema.  Pedal hair growth noted.     Neuro:  Alert and oriented x 3. Coordinated gait.  Light touch sensation is intact     Derm: Normal texture and turgor.  No ecchymosis, or cyanosis.  Hair growth noted.        Musculoskeletal:   Cavus arch.  Pain at left Achilles central  insertion.  No pain on medial or lateral assertion.  No pain on bursal.  No calcaneal compression pain.  No pain plantar heel.  Patient is ambulatory without an assistive device or brace.   left great toe nail medial border shows soft tissue impingement with localized erythema.   negative active drainage/purulence at this time.  No sinus tracts.  No nailbed masses or exostosis.  No pain with range of motion of IPJ or MTPJ.  No ascending cellulitis.    Narrative & Impression   EXAM: XR FOOT LEFT G/E 3 VIEWS  LOCATION: [facility]  DATE: 11/7/2024     INDICATION: Pain of left heel.     COMPARISON: None.                                                                      IMPRESSION: Normal joint spaces and alignment. No fracture. Distal Achilles tendon enthesopathy. Calcaneal heel spur. Small calcaneal bone island.       ASSESSMENT:  Insertional Achilles tendinitis  Onychocryptosis with paronychia left toe.    Discussed how cavus foot puts patient at greater risk for insertional Achilles tendinitis.  Discussed altered gait could contribute to ingrown nail.  Continue Voltaren gel.  Discussed massaging this to help heal and decrease inflammation.  Suggested good how shoes.  Avoid activities that bother this and discussed will bother this.  We discussed heel lifts.  If he would like to try these in the future he will get these.  Also discussed physical therapy.  Discussed iontophoresis.  Patient will call if he would like to do this.  Sometimes immobilization can help this.    Discussed etiology and treatment options in detail w/ the pt.  The potential causes and nature of an ingrown toenail were discussed with the patient.  We reviewed the natural history/prognosis of the condition and potential risks if no treatment is provided.      After thorough discussion and answering all questions, the patient elected to have nail avulsion.  Obtained consent, used 3cc of 1% lidocaine plain to block left  first toe.  Sterile prep, then avulsed the affected border(s).  No evidence of deep abscess noted.  Pt tolerated procedure well.  Sterile bandage placed, gave wound care instruction.  Instructed patient on trimming nails correctly.  They will avoid tight shoes.  Avoid pedicures.  Discussed permanent removal of border if chronic problem.  Return to clinic prn.  Thank you for allowing me participate in the care of this patient.        Ziggy Metzger, IAIN, FACFAS

## 2025-02-06 DIAGNOSIS — M76.62 LEFT ACHILLES BURSITIS: ICD-10-CM

## 2025-02-06 DIAGNOSIS — M79.672 PAIN OF LEFT HEEL: ICD-10-CM

## 2025-02-06 DIAGNOSIS — I10 HYPERTENSION GOAL BP (BLOOD PRESSURE) < 140/90: ICD-10-CM

## 2025-02-06 DIAGNOSIS — F41.1 GAD (GENERALIZED ANXIETY DISORDER): ICD-10-CM

## 2025-02-06 DIAGNOSIS — G43.809 OTHER MIGRAINE WITHOUT STATUS MIGRAINOSUS, NOT INTRACTABLE: ICD-10-CM

## 2025-02-06 RX ORDER — MELOXICAM 15 MG/1
15 TABLET ORAL DAILY
Qty: 30 TABLET | Refills: 1 | Status: SHIPPED | OUTPATIENT
Start: 2025-02-06

## 2025-02-06 RX ORDER — METOPROLOL SUCCINATE 100 MG/1
100 TABLET, EXTENDED RELEASE ORAL DAILY
Qty: 90 TABLET | Refills: 2 | Status: SHIPPED | OUTPATIENT
Start: 2025-02-06

## 2025-02-21 DIAGNOSIS — J45.990 EXERCISE-INDUCED ASTHMA: ICD-10-CM

## 2025-02-24 RX ORDER — ALBUTEROL SULFATE 90 UG/1
INHALANT RESPIRATORY (INHALATION)
Qty: 6.7 G | Refills: 1 | Status: SHIPPED | OUTPATIENT
Start: 2025-02-24

## 2025-03-17 ENCOUNTER — MYC MEDICAL ADVICE (OUTPATIENT)
Dept: FAMILY MEDICINE | Facility: CLINIC | Age: 50
End: 2025-03-17
Payer: COMMERCIAL

## 2025-04-15 SDOH — HEALTH STABILITY: PHYSICAL HEALTH: ON AVERAGE, HOW MANY DAYS PER WEEK DO YOU ENGAGE IN MODERATE TO STRENUOUS EXERCISE (LIKE A BRISK WALK)?: 4 DAYS

## 2025-04-15 SDOH — HEALTH STABILITY: PHYSICAL HEALTH: ON AVERAGE, HOW MANY MINUTES DO YOU ENGAGE IN EXERCISE AT THIS LEVEL?: 20 MIN

## 2025-04-15 ASSESSMENT — ASTHMA QUESTIONNAIRES
QUESTION_2 LAST FOUR WEEKS HOW OFTEN HAVE YOU HAD SHORTNESS OF BREATH: ONCE OR TWICE A WEEK
QUESTION_1 LAST FOUR WEEKS HOW MUCH OF THE TIME DID YOUR ASTHMA KEEP YOU FROM GETTING AS MUCH DONE AT WORK, SCHOOL OR AT HOME: A LITTLE OF THE TIME
ACT_TOTALSCORE: 21
QUESTION_5 LAST FOUR WEEKS HOW WOULD YOU RATE YOUR ASTHMA CONTROL: WELL CONTROLLED
QUESTION_3 LAST FOUR WEEKS HOW OFTEN DID YOUR ASTHMA SYMPTOMS (WHEEZING, COUGHING, SHORTNESS OF BREATH, CHEST TIGHTNESS OR PAIN) WAKE YOU UP AT NIGHT OR EARLIER THAN USUAL IN THE MORNING: NOT AT ALL
QUESTION_4 LAST FOUR WEEKS HOW OFTEN HAVE YOU USED YOUR RESCUE INHALER OR NEBULIZER MEDICATION (SUCH AS ALBUTEROL): ONCE A WEEK OR LESS

## 2025-04-15 ASSESSMENT — SOCIAL DETERMINANTS OF HEALTH (SDOH): HOW OFTEN DO YOU GET TOGETHER WITH FRIENDS OR RELATIVES?: TWICE A WEEK

## 2025-04-21 ENCOUNTER — OFFICE VISIT (OUTPATIENT)
Dept: FAMILY MEDICINE | Facility: CLINIC | Age: 50
End: 2025-04-21
Payer: COMMERCIAL

## 2025-04-21 VITALS
WEIGHT: 222 LBS | RESPIRATION RATE: 12 BRPM | HEART RATE: 63 BPM | SYSTOLIC BLOOD PRESSURE: 133 MMHG | DIASTOLIC BLOOD PRESSURE: 87 MMHG | HEIGHT: 69 IN | BODY MASS INDEX: 32.88 KG/M2 | OXYGEN SATURATION: 98 % | TEMPERATURE: 98.4 F

## 2025-04-21 DIAGNOSIS — G43.809 OTHER MIGRAINE WITHOUT STATUS MIGRAINOSUS, NOT INTRACTABLE: ICD-10-CM

## 2025-04-21 DIAGNOSIS — I10 HYPERTENSION GOAL BP (BLOOD PRESSURE) < 140/90: ICD-10-CM

## 2025-04-21 DIAGNOSIS — F41.1 GAD (GENERALIZED ANXIETY DISORDER): ICD-10-CM

## 2025-04-21 DIAGNOSIS — E78.1 HIGH TRIGLYCERIDES: ICD-10-CM

## 2025-04-21 DIAGNOSIS — Z00.00 ROUTINE GENERAL MEDICAL EXAMINATION AT A HEALTH CARE FACILITY: Primary | ICD-10-CM

## 2025-04-21 DIAGNOSIS — Z12.5 SCREENING PSA (PROSTATE SPECIFIC ANTIGEN): ICD-10-CM

## 2025-04-21 DIAGNOSIS — J45.990 EXERCISE-INDUCED ASTHMA: ICD-10-CM

## 2025-04-21 LAB
ERYTHROCYTE [DISTWIDTH] IN BLOOD BY AUTOMATED COUNT: 14.5 % (ref 10–15)
HCT VFR BLD AUTO: 44 % (ref 40–53)
HGB BLD-MCNC: 15.2 G/DL (ref 13.3–17.7)
MCH RBC QN AUTO: 28.4 PG (ref 26.5–33)
MCHC RBC AUTO-ENTMCNC: 34.5 G/DL (ref 31.5–36.5)
MCV RBC AUTO: 82 FL (ref 78–100)
PLATELET # BLD AUTO: 260 10E3/UL (ref 150–450)
RBC # BLD AUTO: 5.35 10E6/UL (ref 4.4–5.9)
WBC # BLD AUTO: 7 10E3/UL (ref 4–11)

## 2025-04-21 PROCEDURE — 99396 PREV VISIT EST AGE 40-64: CPT | Performed by: FAMILY MEDICINE

## 2025-04-21 PROCEDURE — G2211 COMPLEX E/M VISIT ADD ON: HCPCS | Performed by: FAMILY MEDICINE

## 2025-04-21 PROCEDURE — 1126F AMNT PAIN NOTED NONE PRSNT: CPT | Performed by: FAMILY MEDICINE

## 2025-04-21 PROCEDURE — 80053 COMPREHEN METABOLIC PANEL: CPT | Performed by: FAMILY MEDICINE

## 2025-04-21 PROCEDURE — 3079F DIAST BP 80-89 MM HG: CPT | Performed by: FAMILY MEDICINE

## 2025-04-21 PROCEDURE — 99214 OFFICE O/P EST MOD 30 MIN: CPT | Mod: 25 | Performed by: FAMILY MEDICINE

## 2025-04-21 PROCEDURE — G0103 PSA SCREENING: HCPCS | Performed by: FAMILY MEDICINE

## 2025-04-21 PROCEDURE — 85027 COMPLETE CBC AUTOMATED: CPT | Performed by: FAMILY MEDICINE

## 2025-04-21 PROCEDURE — 3075F SYST BP GE 130 - 139MM HG: CPT | Performed by: FAMILY MEDICINE

## 2025-04-21 PROCEDURE — 36415 COLL VENOUS BLD VENIPUNCTURE: CPT | Performed by: FAMILY MEDICINE

## 2025-04-21 RX ORDER — BUTALBITAL, ACETAMINOPHEN AND CAFFEINE 50; 325; 40 MG/1; MG/1; MG/1
1 TABLET ORAL EVERY 6 HOURS PRN
Qty: 30 TABLET | Refills: 2 | Status: SHIPPED | OUTPATIENT
Start: 2025-04-21

## 2025-04-21 RX ORDER — VENLAFAXINE HYDROCHLORIDE 37.5 MG/1
37.5 CAPSULE, EXTENDED RELEASE ORAL DAILY
Qty: 90 CAPSULE | Refills: 2 | Status: SHIPPED | OUTPATIENT
Start: 2025-04-21

## 2025-04-21 RX ORDER — HYDROCHLOROTHIAZIDE 12.5 MG/1
12.5 TABLET ORAL DAILY
Qty: 90 TABLET | Refills: 2 | Status: SHIPPED | OUTPATIENT
Start: 2025-04-21

## 2025-04-21 ASSESSMENT — PAIN SCALES - GENERAL: PAINLEVEL_OUTOF10: NO PAIN (0)

## 2025-04-21 NOTE — PATIENT INSTRUCTIONS
Patient Education   Preventive Care Advice   This is general advice given by our system to help you stay healthy. However, your care team may have specific advice just for you. Please talk to your care team about your preventive care needs.  Nutrition  Eat 5 or more servings of fruits and vegetables each day.  Try wheat bread, brown rice and whole grain pasta (instead of white bread, rice, and pasta).  Get enough calcium and vitamin D. Check the label on foods and aim for 100% of the RDA (recommended daily allowance).  Lifestyle  Exercise at least 150 minutes each week  (30 minutes a day, 5 days a week).  Do muscle strengthening activities 2 days a week. These help control your weight and prevent disease.  No smoking.  Wear sunscreen to prevent skin cancer.  Have a dental exam and cleaning every 6 months.  Yearly exams  See your health care team every year to talk about:  Any changes in your health.  Any medicines your care team has prescribed.  Preventive care, family planning, and ways to prevent chronic diseases.  Shots (vaccines)   HPV shots (up to age 26), if you've never had them before.  Hepatitis B shots (up to age 59), if you've never had them before.  COVID-19 shot: Get this shot when it's due.  Flu shot: Get a flu shot every year.  Tetanus shot: Get a tetanus shot every 10 years.  Pneumococcal, hepatitis A, and RSV shots: Ask your care team if you need these based on your risk.  Shingles shot (for age 50 and up)  General health tests  Diabetes screening:  Starting at age 35, Get screened for diabetes at least every 3 years.  If you are younger than age 35, ask your care team if you should be screened for diabetes.  Cholesterol test: At age 39, start having a cholesterol test every 5 years, or more often if advised.  Bone density scan (DEXA): At age 50, ask your care team if you should have this scan for osteoporosis (brittle bones).  Hepatitis C: Get tested at least once in your life.  STIs (sexually  transmitted infections)  Before age 24: Ask your care team if you should be screened for STIs.  After age 24: Get screened for STIs if you're at risk. You are at risk for STIs (including HIV) if:  You are sexually active with more than one person.  You don't use condoms every time.  You or a partner was diagnosed with a sexually transmitted infection.  If you are at risk for HIV, ask about PrEP medicine to prevent HIV.  Get tested for HIV at least once in your life, whether you are at risk for HIV or not.  Cancer screening tests  Cervical cancer screening: If you have a cervix, begin getting regular cervical cancer screening tests starting at age 21.  Breast cancer scan (mammogram): If you've ever had breasts, begin having regular mammograms starting at age 40. This is a scan to check for breast cancer.  Colon cancer screening: It is important to start screening for colon cancer at age 45.  Have a colonoscopy test every 10 years (or more often if you're at risk) Or, ask your provider about stool tests like a FIT test every year or Cologuard test every 3 years.  To learn more about your testing options, visit:   .  For help making a decision, visit:   https://bit.ly/pg26660.  Prostate cancer screening test: If you have a prostate, ask your care team if a prostate cancer screening test (PSA) at age 55 is right for you.  Lung cancer screening: If you are a current or former smoker ages 50 to 80, ask your care team if ongoing lung cancer screenings are right for you.  For informational purposes only. Not to replace the advice of your health care provider. Copyright   2023 [employer]. All rights reserved. Clinically reviewed by the [facility]. Victor [id number] - REV 01/24.

## 2025-04-21 NOTE — PROGRESS NOTES
"Preventive Care Visit  [facility]  Dominick Leyva MD, Family Medicine  Apr 21, 2025      Assessment & Plan     ASSESSMENT / PLAN:  (Z00.00) Routine general medical examination at a health care facility  (primary encounter diagnosis)  Comment: generally healthy and normal exam  Plan: Comprehensive metabolic panel, CBC with         platelets        Reviewed self mole/testicle check handout.  Cotn vitaminD    (I10) Hypertension goal BP (blood pressure) < 140/90  Comment: stable  Plan: hydroCHLOROthiazide 12.5 MG tablet        Continue exercise/healthy diet    (E78.1) High triglycerides  Comment: non-fasting  Plan: continue low carb diet/exercise. Chest pain or shortness of breath to er.     (G43.809) Other migraine without status migrainosus, not intractable  Comment: stable  Plan: butalbital-acetaminophen-caffeine (ESGIC)         -40 MG tablet        Rare prn.     (Z12.5) Screening PSA (prostate specific antigen)  Plan: Prostate Specific Antigen Screen            (J45.990) Exercise-induced asthma  Comment: stalbe/improving  Plan: rare prn albuterol    (F41.1) CLAUDINE (generalized anxiety disorder)  Comment: improving  Plan: venlafaxine (EFFEXOR XR) 37.5 MG 24 hr capsule        Will half dosage effexor. Reveiwed risks and side effects of medication  Recheck in 6 months  Soonerif worse. If SUICIAL IDEATION OR HOMOCIDAL IDEATION OR EFREN TO ER       Patient has been advised of split billing requirements and indicates understanding: Yes        BMI  Estimated body mass index is 32.78 kg/m  as calculated from the following:    Height as of this encounter: 1.753 m (5' 9\").    Weight as of this encounter: 100.7 kg (222 lb).   Weight management plan: Discussed healthy diet and exercise guidelines    Counseling  Appropriate preventive services were addressed with this patient via screening, questionnaire, or discussion as appropriate for fall prevention, nutrition, physical activity, Tobacco-use " cessation, social engagement, weight loss and cognition.  Checklist reviewing preventive services available has been given to the patient.  Reviewed patient's diet, addressing concerns and/or questions.           Kassidy Lopez is a 49 year old, presenting for the following:  Physical    History anxiety, migraines, exercise-induced asthma and elevated blood pressure.  Had colonoscopy in fall - polyps repeat in .  No nausea, vomiting or diarrhea or constipation. No urine changes or hematuria. Stream ok.   Grandpa with prostate cancer - elderly.   No family history mi/cva. Dad  crohns at 66yo . Mom doing ok. Sister with crohns.   No mole changes. No testicle pain/masses/hernia. S/p vasectomy.   No std concerns. Walking regularly and weights.   vitaminD or milk.   . 4 kids 23-14   Work going ok. Home. Some snoring - no brad noted.   Caffiene - one cup/day. No ALCOHOL. No pop.   Rare mdi outside.         2025     4:58 PM   Additional Questions   Roomed by Ruchi   Accompanied by self, wife         2025   Forms   Any forms needing to be completed Yes         2025     4:58 PM   Patient Reported Additional Medications   Patient reports taking the following new medications n/a          HPI           Advance Care Planning    Document on file is a Health Care Directive or POLST.        4/15/2025   General Health   How would you rate your overall physical health? Good   Feel stress (tense, anxious, or unable to sleep) Only a little   (!) STRESS CONCERN      4/15/2025   Nutrition   Three or more servings of calcium each day? Yes   Diet: Low salt    Low fat/cholesterol   How many servings of fruit and vegetables per day? (!) 2-3   How many sweetened beverages each day? 0-1       Multiple values from one day are sorted in reverse-chronological order         4/15/2025   Exercise   Days per week of moderate/strenous exercise 4 days   Average minutes spent exercising at this level 20 min          "4/15/2025   Social Factors   Frequency of gathering with friends or relatives Twice a week   Worry food won't last until get money to buy more No   Food not last or not have enough money for food? No   Do you have housing? (Housing is defined as stable permanent housing and does not include staying ouside in a car, in a tent, in an abandoned building, in an overnight shelter, or couch-surfing.) Yes   Are you worried about losing your housing? No   Lack of transportation? No   Unable to get utilities (heat,electricity)? No         4/15/2025   Dental   Dentist two times every year? Yes         Today's PHQ-2 Score:       4/21/2025     2:02 PM   PHQ-2 ( 1999 [employer])   Q1: Little interest or pleasure in doing things 0   Q2: Feeling down, depressed or hopeless 1   PHQ-2 Score 1    Q1: Little interest or pleasure in doing things Not at all   Q2: Feeling down, depressed or hopeless Several days   PHQ-2 Score 1       Patient-reported           4/15/2025   Substance Use   Alcohol more than 3/day or more than 7/wk Not Applicable   Do you use any other substances recreationally? No     Social History     Tobacco Use    Smoking status: Never    Smokeless tobacco: Never   Vaping Use    Vaping status: Never Used   Substance Use Topics    Alcohol use: No    Drug use: No           4/15/2025   STI Screening   New sexual partner(s) since last STI/HIV test? No   ASCVD Risk   The 10-year ASCVD risk score (Lala IBARRA, et al., 2019) is: 6.2%    Values used to calculate the score:      Age: 49 years      Sex: Male      Is Non- : No      Diabetic: No      Tobacco smoker: No      Systolic Blood Pressure: 133 mmHg      Is BP treated: Yes      HDL Cholesterol: 31 mg/dL      Total Cholesterol: 193 mg/dL       Reviewed and updated as needed this visit by Provider                             Objective    Exam  /87   Pulse 63   Temp 98.4  F (36.9  C) (Tympanic)   Resp 12   Ht 1.753 m (5' 9\")   Wt 100.7 kg " "(222 lb)   SpO2 98%   BMI 32.78 kg/m     Estimated body mass index is 32.78 kg/m  as calculated from the following:    Height as of this encounter: 1.753 m (5' 9\").    Weight as of this encounter: 100.7 kg (222 lb).    Physical Exam  GENERAL: alert and no distress  EYES: Eyes grossly normal to inspection, PERRL and conjunctivae and sclerae normal  HENT: ear canals and TM's normal, nose and mouth without ulcers or lesions  NECK: no adenopathy, no asymmetry, masses, or scars  RESP: lungs clear to auscultation - no rales, rhonchi or wheezes  BREAST: normal without masses, tenderness or nipple discharge and no palpable axillary masses or adenopathy  CV: regular rate and rhythm, normal S1 S2, no S3 or S4, no murmur, click or rub, no peripheral edema   ABDOMEN: soft, nontender, no hepatosplenomegaly, no masses and bowel sounds normal   (male): patient deferred /rectal exams  MS: no gross musculoskeletal defects noted, no edema  SKIN: no suspicious lesions or rashes  NEURO: Normal strength and tone, mentation intact and speech normal  PSYCH: mentation appears normal, affect normal/bright        Signed Electronically by: Dominick Leyva MD    "

## 2025-04-23 LAB
ALBUMIN SERPL BCG-MCNC: 4.2 G/DL (ref 3.5–5.2)
ALP SERPL-CCNC: 73 U/L (ref 40–150)
ALT SERPL W P-5'-P-CCNC: 43 U/L (ref 0–70)
ANION GAP SERPL CALCULATED.3IONS-SCNC: 12 MMOL/L (ref 7–15)
AST SERPL W P-5'-P-CCNC: 39 U/L (ref 0–45)
BILIRUB SERPL-MCNC: 1 MG/DL
BUN SERPL-MCNC: 11.4 MG/DL (ref 6–20)
CALCIUM SERPL-MCNC: 9.4 MG/DL (ref 8.8–10.4)
CHLORIDE SERPL-SCNC: 101 MMOL/L (ref 98–107)
CREAT SERPL-MCNC: 1.2 MG/DL (ref 0.67–1.17)
EGFRCR SERPLBLD CKD-EPI 2021: 74 ML/MIN/1.73M2
GLUCOSE SERPL-MCNC: 88 MG/DL (ref 70–99)
HCO3 SERPL-SCNC: 26 MMOL/L (ref 22–29)
POTASSIUM SERPL-SCNC: 4.1 MMOL/L (ref 3.4–5.3)
PROT SERPL-MCNC: 6.7 G/DL (ref 6.4–8.3)
PSA SERPL DL<=0.01 NG/ML-MCNC: 0.42 NG/ML (ref 0–2.5)
SODIUM SERPL-SCNC: 139 MMOL/L (ref 135–145)

## 2025-07-17 ENCOUNTER — OFFICE VISIT (OUTPATIENT)
Dept: FAMILY MEDICINE | Facility: CLINIC | Age: 50
End: 2025-07-17
Payer: COMMERCIAL

## 2025-07-17 VITALS
HEART RATE: 64 BPM | RESPIRATION RATE: 20 BRPM | HEIGHT: 69 IN | OXYGEN SATURATION: 99 % | SYSTOLIC BLOOD PRESSURE: 128 MMHG | DIASTOLIC BLOOD PRESSURE: 82 MMHG | BODY MASS INDEX: 32.44 KG/M2 | WEIGHT: 219 LBS | TEMPERATURE: 97.5 F

## 2025-07-17 DIAGNOSIS — G47.00 INSOMNIA, UNSPECIFIED TYPE: ICD-10-CM

## 2025-07-17 DIAGNOSIS — G43.809 OTHER MIGRAINE WITHOUT STATUS MIGRAINOSUS, NOT INTRACTABLE: ICD-10-CM

## 2025-07-17 DIAGNOSIS — R53.83 OTHER FATIGUE: Primary | ICD-10-CM

## 2025-07-17 DIAGNOSIS — F41.1 GAD (GENERALIZED ANXIETY DISORDER): ICD-10-CM

## 2025-07-17 DIAGNOSIS — I10 HYPERTENSION GOAL BP (BLOOD PRESSURE) < 140/90: ICD-10-CM

## 2025-07-17 RX ORDER — HYDROCHLOROTHIAZIDE 25 MG/1
25 TABLET ORAL DAILY
Qty: 90 TABLET | Refills: 1 | Status: SHIPPED | OUTPATIENT
Start: 2025-07-17

## 2025-07-17 RX ORDER — METOPROLOL SUCCINATE 50 MG/1
50 TABLET, EXTENDED RELEASE ORAL DAILY
Qty: 90 TABLET | Refills: 1 | Status: SHIPPED | OUTPATIENT
Start: 2025-07-17

## 2025-07-17 ASSESSMENT — PAIN SCALES - GENERAL: PAINLEVEL_OUTOF10: MODERATE PAIN (4)

## 2025-07-17 ASSESSMENT — ENCOUNTER SYMPTOMS: FATIGUE: 1

## 2025-07-17 NOTE — PROGRESS NOTES
ASSESSMENT / PLAN:  (R53.83) Other fatigue  (primary encounter diagnosis)  Comment: lack of sleep/too much toprol vs psych vs ?  Plan: Adult Sleep Eval & Management          Referral        Will 1/2 toprol. Continue exercise. Follow-up sleep specialist. Return to clinic if worse/new symptoms. Labs if needed. Expected course and warning signs reviewed. Call/email with questions/concerns  Consider adding wellbutrin in AM    (G47.00) Insomnia, unspecified type  Comment: needs help  Plan: Adult Sleep Eval & Management          Referral        Over the counter sleep aides discussed. Continue prn trazodone    (I10) Hypertension goal BP (blood pressure) < 140/90  Comment: stable  Plan: metoprolol succinate ER (TOPROL XL) 50 MG 24 hr        tablet, hydrochlorothiazide (HYDRODIURIL) 25 MG        tablet        Will lower toprol and raise hydrochlorothiazide. Continue self-monitor and exercise. Chest pain or shortness of breath to er. Recheck in 6 months      (G43.809) Other migraine without status migrainosus, not intractable  Comment: stable      (F41.1) CLAUDINE (generalized anxiety disorder)  Comment: stable  Plan: continue effexor.       Subjective   John is a 49 year old, presenting for the following health issues:  Fatigue  Ongoing for years. Insomnia issues. Trying sleep schedule- not  helpful.  Cools/dark and quiet.  Limited caffeine.  No ALCOHOL. No tv before bed. Trazodone3/4 pill - can help stay asleep - once 2 weeks. Melatonin/tylenol pm. No thc gummies.   Stress from wife's foot fracture. Work ok.   Limited time for self. Exercise - very active  No chest pain or shortness of breath.   Supper 5-7pm. Bedtime 10-midnight. Limited snack.   History anxiety, migraines, exercise-induced asthma and elevated blood pressure.  Outside blood pressure running. Effexor lower dosage helpful.   Had colonoscopy in fall - polyps repeat in 2026.  No nausea, vomiting or diarrhea or constipation. No urine changes or  "hematuria. Stream ok.   Grandpa with prostate cancer - elderly.   No family history mi/cva. Dad  crohns at 68yo . Mom doing ok. Sister with crohns.   No mole changes. No testicle pain/masses/hernia. S/p vasectomy.   No std concerns. Walking regularly and weights.   vitaminD or milk.   . 4 kids 23-14 - youngest at home only.   Work going ok. Home. Some snoring - no brad noted.   Caffiene - one cup/day. No ALCOHOL. No pop.          2025    12:52 PM   Additional Questions   Roomed by Ruchi   Accompanied by self         2025    12:52 PM   Patient Reported Additional Medications   Patient reports taking the following new medications n/a     Fatigue  Associated symptoms include fatigue.   History of Present Illness       Reason for visit:  Tired all the time. Just wiped out. Achilles Bursitis flareup    He eats 2-3 servings of fruits and vegetables daily.He consumes 2 sweetened beverage(s) daily.He exercises with enough effort to increase his heart rate 20 to 29 minutes per day.  He exercises with enough effort to increase his heart rate 3 or less days per week.   He is taking medications regularly.              Objective    /82   Pulse 64   Temp 97.5  F (36.4  C) (Tympanic)   Resp 20   Ht 1.753 m (5' 9\")   Wt 99.3 kg (219 lb)   SpO2 99%   BMI 32.34 kg/m      Physical Exam   GENERAL: alert and no distress  EYES: Eyes grossly normal to inspection, PERRL and conjunctivae and sclerae normal  HENT: ear canals and TM's normal, nose and mouth without ulcers or lesions  NECK: no adenopathy, no asymmetry, masses, or scars  RESP: lungs clear to auscultation - no rales, rhonchi or wheezes  CV: regular rate and rhythm, normal S1 S2, no S3 or S4, no murmur, click or rub, no peripheral edema   ABDOMEN: soft, nontender, no hepatosplenomegaly, no masses and bowel sounds normal  MS: no gross musculoskeletal defects noted, no edema  NEURO: Normal strength and tone, mentation intact and speech " normal  PSYCH: mentation appears normal, affect normal/bright            Signed Electronically by: Dominick Leyva MD

## 2025-08-05 ENCOUNTER — OFFICE VISIT (OUTPATIENT)
Dept: ORTHOPEDICS | Facility: CLINIC | Age: 50
End: 2025-08-05
Payer: COMMERCIAL

## 2025-08-05 ENCOUNTER — OFFICE VISIT (OUTPATIENT)
Dept: DERMATOLOGY | Facility: CLINIC | Age: 50
End: 2025-08-05
Payer: COMMERCIAL

## 2025-08-05 DIAGNOSIS — M76.62 CALCIFIC ACHILLES TENDINITIS OF LEFT LOWER EXTREMITY: ICD-10-CM

## 2025-08-05 DIAGNOSIS — G89.29 CHRONIC PAIN OF LEFT ANKLE: ICD-10-CM

## 2025-08-05 DIAGNOSIS — L28.1 PRURIGO NODULARIS: ICD-10-CM

## 2025-08-05 DIAGNOSIS — M76.60 INSERTIONAL ACHILLES TENDINOPATHY: Primary | ICD-10-CM

## 2025-08-05 DIAGNOSIS — M25.572 CHRONIC PAIN OF LEFT ANKLE: ICD-10-CM

## 2025-08-05 DIAGNOSIS — L29.9 PRURITUS: Primary | ICD-10-CM

## 2025-08-05 PROCEDURE — 99214 OFFICE O/P EST MOD 30 MIN: CPT | Mod: 25 | Performed by: STUDENT IN AN ORGANIZED HEALTH CARE EDUCATION/TRAINING PROGRAM

## 2025-08-05 PROCEDURE — 1126F AMNT PAIN NOTED NONE PRSNT: CPT | Performed by: STUDENT IN AN ORGANIZED HEALTH CARE EDUCATION/TRAINING PROGRAM

## 2025-08-05 PROCEDURE — 11900 INJECT SKIN LESIONS </W 7: CPT | Performed by: STUDENT IN AN ORGANIZED HEALTH CARE EDUCATION/TRAINING PROGRAM

## 2025-08-05 PROCEDURE — 99214 OFFICE O/P EST MOD 30 MIN: CPT | Performed by: PREVENTIVE MEDICINE

## 2025-08-05 RX ORDER — FLUOCINONIDE 0.5 MG/G
OINTMENT TOPICAL 2 TIMES DAILY
Qty: 30 G | Refills: 11 | Status: SHIPPED | OUTPATIENT
Start: 2025-08-05

## 2025-08-05 RX ORDER — TRIAMCINOLONE ACETONIDE 40 MG/ML
40 INJECTION, SUSPENSION INTRA-ARTICULAR; INTRAMUSCULAR ONCE
Status: COMPLETED | OUTPATIENT
Start: 2025-08-05 | End: 2025-08-05

## 2025-08-05 RX ADMIN — TRIAMCINOLONE ACETONIDE 16 MG: 40 INJECTION, SUSPENSION INTRA-ARTICULAR; INTRAMUSCULAR at 09:26

## 2025-08-05 ASSESSMENT — PAIN SCALES - GENERAL: PAINLEVEL_OUTOF10: NO PAIN (0)

## 2025-08-12 ENCOUNTER — ANCILLARY PROCEDURE (OUTPATIENT)
Dept: MRI IMAGING | Facility: CLINIC | Age: 50
End: 2025-08-12
Attending: PREVENTIVE MEDICINE
Payer: COMMERCIAL

## 2025-08-12 DIAGNOSIS — M25.572 CHRONIC PAIN OF LEFT ANKLE: ICD-10-CM

## 2025-08-12 DIAGNOSIS — G89.29 CHRONIC PAIN OF LEFT ANKLE: ICD-10-CM

## 2025-08-12 DIAGNOSIS — M76.62 CALCIFIC ACHILLES TENDINITIS OF LEFT LOWER EXTREMITY: ICD-10-CM

## 2025-08-12 DIAGNOSIS — M76.60 INSERTIONAL ACHILLES TENDINOPATHY: ICD-10-CM

## 2025-08-12 PROCEDURE — 73721 MRI JNT OF LWR EXTRE W/O DYE: CPT | Mod: LT | Performed by: RADIOLOGY

## 2025-08-14 ENCOUNTER — VIRTUAL VISIT (OUTPATIENT)
Dept: ORTHOPEDICS | Facility: CLINIC | Age: 50
End: 2025-08-14
Attending: PREVENTIVE MEDICINE
Payer: COMMERCIAL

## 2025-08-14 DIAGNOSIS — M25.572 CHRONIC PAIN OF LEFT ANKLE: ICD-10-CM

## 2025-08-14 DIAGNOSIS — M76.60 INSERTIONAL ACHILLES TENDINOPATHY: Primary | ICD-10-CM

## 2025-08-14 DIAGNOSIS — G89.29 CHRONIC PAIN OF LEFT ANKLE: ICD-10-CM

## 2025-08-14 DIAGNOSIS — M76.62 CALCIFIC ACHILLES TENDINITIS OF LEFT LOWER EXTREMITY: ICD-10-CM

## 2025-08-15 SDOH — HEALTH STABILITY: PHYSICAL HEALTH: ON AVERAGE, HOW MANY MINUTES DO YOU ENGAGE IN EXERCISE AT THIS LEVEL?: 20 MIN

## 2025-08-15 SDOH — HEALTH STABILITY: PHYSICAL HEALTH: ON AVERAGE, HOW MANY DAYS PER WEEK DO YOU ENGAGE IN MODERATE TO STRENUOUS EXERCISE (LIKE A BRISK WALK)?: 1 DAY

## 2025-08-19 ENCOUNTER — OFFICE VISIT (OUTPATIENT)
Dept: ORTHOPEDICS | Facility: CLINIC | Age: 50
End: 2025-08-19
Payer: COMMERCIAL

## 2025-08-19 DIAGNOSIS — M76.62 CALCIFIC ACHILLES TENDINITIS OF LEFT LOWER EXTREMITY: ICD-10-CM

## 2025-08-19 DIAGNOSIS — M76.60 INSERTIONAL ACHILLES TENDINOPATHY: ICD-10-CM

## 2025-08-19 DIAGNOSIS — M76.62 LEFT ACHILLES BURSITIS: Primary | ICD-10-CM

## 2025-08-19 PROCEDURE — 20606 DRAIN/INJ JOINT/BURSA W/US: CPT | Mod: LT | Performed by: PREVENTIVE MEDICINE

## 2025-08-19 PROCEDURE — 99207 PR DROP WITH A PROCEDURE: CPT | Performed by: PREVENTIVE MEDICINE

## 2025-08-19 RX ADMIN — METHYLPREDNISOLONE ACETATE 40 MG: 40 INJECTION, SUSPENSION INTRA-ARTICULAR; INTRALESIONAL; INTRAMUSCULAR; SOFT TISSUE at 15:03

## 2025-08-19 RX ADMIN — LIDOCAINE HYDROCHLORIDE 2 ML: 10 INJECTION, SOLUTION EPIDURAL; INFILTRATION; INTRACAUDAL; PERINEURAL at 15:03

## 2025-08-20 RX ORDER — LIDOCAINE HYDROCHLORIDE 10 MG/ML
2 INJECTION, SOLUTION EPIDURAL; INFILTRATION; INTRACAUDAL; PERINEURAL
Status: COMPLETED | OUTPATIENT
Start: 2025-08-19 | End: 2025-08-19

## 2025-08-20 RX ORDER — METHYLPREDNISOLONE ACETATE 40 MG/ML
40 INJECTION, SUSPENSION INTRA-ARTICULAR; INTRALESIONAL; INTRAMUSCULAR; SOFT TISSUE
Status: COMPLETED | OUTPATIENT
Start: 2025-08-19 | End: 2025-08-19

## (undated) RX ORDER — LIDOCAINE HYDROCHLORIDE 10 MG/ML
INJECTION, SOLUTION EPIDURAL; INFILTRATION; INTRACAUDAL; PERINEURAL
Status: DISPENSED
Start: 2025-08-19

## (undated) RX ORDER — METHYLPREDNISOLONE ACETATE 40 MG/ML
INJECTION, SUSPENSION INTRA-ARTICULAR; INTRALESIONAL; INTRAMUSCULAR; SOFT TISSUE
Status: DISPENSED
Start: 2025-08-19

## (undated) RX ORDER — TRIAMCINOLONE ACETONIDE 40 MG/ML
INJECTION, SUSPENSION INTRA-ARTICULAR; INTRAMUSCULAR
Status: DISPENSED
Start: 2025-08-05